# Patient Record
Sex: MALE | Race: WHITE | NOT HISPANIC OR LATINO | Employment: FULL TIME | URBAN - METROPOLITAN AREA
[De-identification: names, ages, dates, MRNs, and addresses within clinical notes are randomized per-mention and may not be internally consistent; named-entity substitution may affect disease eponyms.]

---

## 2017-08-23 ENCOUNTER — TELEPHONE (OUTPATIENT)
Dept: FAMILY MEDICINE | Facility: CLINIC | Age: 52
End: 2017-08-23

## 2017-08-23 ENCOUNTER — DOCUMENTATION ONLY (OUTPATIENT)
Dept: FAMILY MEDICINE | Facility: CLINIC | Age: 52
End: 2017-08-23

## 2017-08-23 DIAGNOSIS — F41.9 ANXIETY: ICD-10-CM

## 2017-08-23 PROBLEM — J30.2 SEASONAL ALLERGIES: Status: ACTIVE | Noted: 2017-08-23

## 2017-08-23 PROBLEM — E78.5 HYPERLIPIDEMIA: Status: ACTIVE | Noted: 2017-08-23

## 2017-08-23 PROBLEM — E11.9 DM (DIABETES MELLITUS): Status: ACTIVE | Noted: 2017-08-23

## 2017-08-23 PROBLEM — I10 HTN (HYPERTENSION): Status: ACTIVE | Noted: 2017-08-23

## 2017-08-23 RX ORDER — LISINOPRIL AND HYDROCHLOROTHIAZIDE 12.5; 2 MG/1; MG/1
1 TABLET ORAL DAILY
Refills: 1 | COMMUNITY
Start: 2017-07-02 | End: 2017-09-30 | Stop reason: SDUPTHER

## 2017-08-23 RX ORDER — ATORVASTATIN CALCIUM 20 MG/1
TABLET, FILM COATED ORAL
Refills: 1 | COMMUNITY
Start: 2017-07-16 | End: 2017-10-17 | Stop reason: SDUPTHER

## 2017-08-23 RX ORDER — INSULIN GLARGINE 100 [IU]/ML
INJECTION, SOLUTION SUBCUTANEOUS
Refills: 5 | COMMUNITY
Start: 2017-07-11 | End: 2018-04-03 | Stop reason: SDUPTHER

## 2017-08-23 RX ORDER — ASPIRIN 81 MG/1
81 TABLET ORAL DAILY
COMMUNITY

## 2017-09-02 RX ORDER — GLYBURIDE 5 MG/1
TABLET ORAL
Qty: 360 TABLET | Refills: 1 | Status: SHIPPED | OUTPATIENT
Start: 2017-09-02 | End: 2018-04-03 | Stop reason: SDUPTHER

## 2017-09-19 ENCOUNTER — OFFICE VISIT (OUTPATIENT)
Dept: PRIMARY CARE CLINIC | Facility: CLINIC | Age: 52
End: 2017-09-19
Payer: COMMERCIAL

## 2017-09-19 ENCOUNTER — TELEPHONE (OUTPATIENT)
Dept: PRIMARY CARE CLINIC | Facility: CLINIC | Age: 52
End: 2017-09-19

## 2017-09-19 VITALS
SYSTOLIC BLOOD PRESSURE: 140 MMHG | BODY MASS INDEX: 35.61 KG/M2 | RESPIRATION RATE: 18 BRPM | WEIGHT: 254.38 LBS | TEMPERATURE: 98 F | HEIGHT: 71 IN | DIASTOLIC BLOOD PRESSURE: 70 MMHG

## 2017-09-19 DIAGNOSIS — E11.9 TYPE 2 DIABETES MELLITUS WITHOUT COMPLICATION, UNSPECIFIED LONG TERM INSULIN USE STATUS: ICD-10-CM

## 2017-09-19 DIAGNOSIS — M70.21 OLECRANON BURSITIS OF RIGHT ELBOW: Primary | ICD-10-CM

## 2017-09-19 PROCEDURE — 99213 OFFICE O/P EST LOW 20 MIN: CPT | Mod: 24,S$GLB,, | Performed by: NURSE PRACTITIONER

## 2017-09-19 PROCEDURE — 3008F BODY MASS INDEX DOCD: CPT | Mod: S$GLB,,, | Performed by: NURSE PRACTITIONER

## 2017-09-19 PROCEDURE — 20605 DRAIN/INJ JOINT/BURSA W/O US: CPT | Mod: RT,S$GLB,, | Performed by: FAMILY MEDICINE

## 2017-09-19 PROCEDURE — 4010F ACE/ARB THERAPY RXD/TAKEN: CPT | Mod: S$GLB,,, | Performed by: NURSE PRACTITIONER

## 2017-09-19 RX ORDER — NAPROXEN 500 MG/1
500 TABLET ORAL 2 TIMES DAILY
Qty: 30 TABLET | Refills: 2 | Status: SHIPPED | OUTPATIENT
Start: 2017-09-19 | End: 2018-03-28

## 2017-09-19 RX ORDER — DAPAGLIFLOZIN 10 MG/1
1 TABLET, FILM COATED ORAL DAILY
COMMUNITY
Start: 2017-07-05 | End: 2017-12-14 | Stop reason: SDUPTHER

## 2017-09-19 NOTE — TELEPHONE ENCOUNTER
Spoke to patient.  He states he has an appt for his regular follow up on 9/25/17, but woke up this morning with his right elbow red, swollen, hot and very painful.  Forearm is swollen as well.  Please advise

## 2017-09-19 NOTE — TELEPHONE ENCOUNTER
----- Message from Marlena Lewis sent at 9/19/2017  9:28 AM CDT -----  Contact: shila   Thinks bursa ruptured, swollen painful   Call back

## 2017-09-19 NOTE — PROCEDURES
Intermediate Joint Aspiration/Injection  Date/Time: 9/19/2017 5:34 PM  Performed by: ALBERTO ERVIN  Authorized by: ALBERTO ERVIN     Consent Done?:  Yes (Verbal)  Indications:  Joint swelling, pain and diagnostic evaluation  Site marked: The procedure site was marked    Timeout: Prior to procedure the correct patient, procedure, and site was verified      Location:  Elbow  Site:  R olecranon bursa  Prep: Patient was prepped and draped in usual sterile fashion    Ultrasonic Guidance for needle placement: No  Needle size:  18 G  Approach:  Posterior  Aspirate amount (ml):  30  Aspirate:  Serous and cloudy  Lab: Fluid sent for laboratory analysis    Patient tolerance:  Patient tolerated the procedure well with no immediate complications

## 2017-09-19 NOTE — PROGRESS NOTES
"Chief Complaint  Chief Complaint   Patient presents with    Joint Swelling     right elbow       HPI  Cedrick Saunders is a 52 y.o. male with multiple medical diagnoses as listed in the medical history and problem list that presents for right elbow swelling and pain.   Patient is new to me but known to the clinic per his last visit 5-. Presents with complaints of elbow swelling "on and off for the past few months". Fluid usually drains spontaneously with clear/yellow fluid. Following spontaneous drainage about 3 days ago, the elbow became red and warm. It has progressively worsened , increased in swelling and tenderness over the past few days. This is the first time the elbow has become red and warm to touch. Reports taking Ibuprofen with no relief. History of various right elbow surgeries including bone spur removal several years ago. Denies fever. Has a history of recurrent staph infections and abscesses in the past. Denies SOB, chest pain, palpitations.        PAST MEDICAL HISTORY:  Past Medical History:   Diagnosis Date    Anxiety     Diabetes mellitus        PAST SURGICAL HISTORY:  Past Surgical History:   Procedure Laterality Date    bilateral knee replacement         SOCIAL HISTORY:  Social History     Social History    Marital status:      Spouse name: N/A    Number of children: N/A    Years of education: N/A     Occupational History    Not on file.     Social History Main Topics    Smoking status: Never Smoker    Smokeless tobacco: Never Used    Alcohol use Yes      Comment: social    Drug use: No    Sexual activity: Not on file     Other Topics Concern    Not on file     Social History Narrative    No narrative on file       FAMILY HISTORY:  Family History   Problem Relation Age of Onset    Heart disease Father     Diabetes Father        ALLERGIES AND MEDICATIONS: updated and reviewed.  Review of patient's allergies indicates:  No Known Allergies  Current Outpatient " Prescriptions   Medication Sig Dispense Refill    FARXIGA 10 mg Tab       aspirin (ECOTRIN) 81 MG EC tablet Take 81 mg by mouth once daily.      atorvastatin (LIPITOR) 20 MG tablet TAKE 1 TABLET ONCE A DAY ORALLY FOR 90 DAYS  1    BASAGLAR KWIKPEN 100 unit/mL (3 mL) InPn pen 40 UNITS AT BEDTIME ONCE A DAY SUBCUTANEOUS  5    escitalopram (LEXAPRO) 10 MG tablet Take 10 mg by mouth once daily.        glyBURIDE (DIABETA) 5 MG tablet TAKE 2 TABLETS TWICE A DAY ORALLY FOR 90 DAYS SUPPLY 360 tablet 1    lisinopril-hydrochlorothiazide (PRINZIDE,ZESTORETIC) 20-12.5 mg per tablet Take 1 tablet by mouth once daily.  1    naproxen (NAPROSYN) 500 MG tablet Take 1 tablet (500 mg total) by mouth 2 (two) times daily. 30 tablet 2    pioglitazone (ACTOS) 30 MG tablet Take 30 mg by mouth once daily.        sitagliptan-metformin (JANUMET) 50-1,000 mg per tablet Take 1 tablet by mouth 2 (two) times daily with meals.         Current Facility-Administered Medications   Medication Dose Route Frequency Provider Last Rate Last Dose    betamethasone acetate-betamethasone sodium phosphate injection 6 mg  6 mg Intramuscular 1 time in Clinic/HOD Love Mabry NP             ROS  Review of Systems   Constitutional: Negative for chills, fatigue and fever.   HENT: Negative for congestion, rhinorrhea, sinus pressure and sore throat.    Respiratory: Negative for cough, chest tightness and shortness of breath.    Cardiovascular: Negative for chest pain and palpitations.   Gastrointestinal: Negative for abdominal pain, blood in stool, diarrhea, nausea and vomiting.   Genitourinary: Negative for dysuria, frequency, hematuria and urgency.   Musculoskeletal: Positive for arthralgias and joint swelling.   Skin: Negative for rash and wound.   Neurological: Negative for dizziness and headaches.   Psychiatric/Behavioral: Negative for dysphoric mood and sleep disturbance. The patient is not nervous/anxious.            PHYSICAL EXAM  Vitals:     "09/19/17 1140   BP: (!) 140/70   BP Location: Right arm   Patient Position: Sitting   BP Method: Medium (Manual)   Resp: 18   Temp: 98.2 °F (36.8 °C)   TempSrc: Oral   Weight: 115.4 kg (254 lb 6.4 oz)   Height: 5' 11" (1.803 m)    Body mass index is 35.48 kg/m².  Weight: 115.4 kg (254 lb 6.4 oz)   Height: 5' 11" (180.3 cm)     Physical Exam   Constitutional: He is oriented to person, place, and time. He appears well-developed and well-nourished.   HENT:   Head: Normocephalic.   Mouth/Throat: Uvula is midline, oropharynx is clear and moist and mucous membranes are normal.   Eyes: Conjunctivae are normal.   Cardiovascular: Normal rate, regular rhythm, normal heart sounds and normal pulses.    No murmur heard.  Pulses:       Radial pulses are 2+ on the right side, and 2+ on the left side.   Pulmonary/Chest: Effort normal and breath sounds normal. He has no wheezes.   Abdominal: Soft. Bowel sounds are normal. There is no tenderness.   Musculoskeletal: He exhibits no edema.        Right elbow: He exhibits decreased range of motion, swelling and effusion. Tenderness (right elbow with swelling, warmth, TTP) found. Olecranon process tenderness noted.   Neurological: He is alert and oriented to person, place, and time.   Skin: Skin is warm and dry. No rash noted.   Psychiatric: He has a normal mood and affect.         Health Maintenance       Date Due Completion Date    Hepatitis C Screening 1965 ---    Lipid Panel 1965 ---    Foot Exam 08/26/1973 ---    Eye Exam 08/26/1973 ---    TETANUS VACCINE 08/26/1981 ---    Pneumococcal PPSV23 (Medium Risk) (1) 08/26/1981 ---    Hemoglobin A1c 08/01/2007 2/1/2007    Colonoscopy 08/26/2013 ---    Influenza Vaccine 08/01/2017 ---            Assessment & Plan    Cedrick was seen today for joint swelling.    Diagnoses and all orders for this visit:    Olecranon bursitis of right elbow  -     CULTURE, AEROBIC  (SPECIFY SOURCE)  -     Culture, Anaerobic  -     naproxen " (NAPROSYN) 500 MG tablet; Take 1 tablet (500 mg total) by mouth 2 (two) times daily.  - Ice and Compression for the next several days  - Call clinic if any temperatures develop or new onset s/s of infection - will consider oral ABX at that time.       Type 2 diabetes mellitus without complication, unspecified long term insulin use status  -     Comprehensive metabolic panel; Future  -     Hemoglobin A1c; Future  -  Follow-up labwork next week with PCP          Health Maintenance reviewed.    Follow-up: No Follow-up on file.

## 2017-09-25 ENCOUNTER — OFFICE VISIT (OUTPATIENT)
Dept: PRIMARY CARE CLINIC | Facility: CLINIC | Age: 52
End: 2017-09-25
Payer: COMMERCIAL

## 2017-09-25 VITALS
HEART RATE: 66 BPM | HEIGHT: 71 IN | WEIGHT: 251.31 LBS | SYSTOLIC BLOOD PRESSURE: 106 MMHG | TEMPERATURE: 98 F | RESPIRATION RATE: 18 BRPM | DIASTOLIC BLOOD PRESSURE: 65 MMHG | OXYGEN SATURATION: 96 % | BODY MASS INDEX: 35.18 KG/M2

## 2017-09-25 DIAGNOSIS — M70.21 OLECRANON BURSITIS OF RIGHT ELBOW: ICD-10-CM

## 2017-09-25 DIAGNOSIS — M71.121 SEPTIC OLECRANON BURSITIS, RIGHT: Primary | ICD-10-CM

## 2017-09-25 PROBLEM — E78.2 MIXED HYPERLIPIDEMIA: Status: ACTIVE | Noted: 2017-08-23

## 2017-09-25 PROBLEM — E78.2 DM TYPE 2 WITH DIABETIC MIXED HYPERLIPIDEMIA: Status: ACTIVE | Noted: 2017-08-23

## 2017-09-25 PROBLEM — E11.69 DM TYPE 2 WITH DIABETIC MIXED HYPERLIPIDEMIA: Status: ACTIVE | Noted: 2017-08-23

## 2017-09-25 PROCEDURE — 99213 OFFICE O/P EST LOW 20 MIN: CPT | Mod: S$GLB,,, | Performed by: FAMILY MEDICINE

## 2017-09-25 PROCEDURE — 3008F BODY MASS INDEX DOCD: CPT | Mod: S$GLB,,, | Performed by: FAMILY MEDICINE

## 2017-09-25 PROCEDURE — 99999 PR PBB SHADOW E&M-EST. PATIENT-LVL III: CPT | Mod: PBBFAC,,, | Performed by: FAMILY MEDICINE

## 2017-09-25 RX ORDER — TRAMADOL HYDROCHLORIDE 50 MG/1
50 TABLET ORAL EVERY 6 HOURS PRN
Qty: 30 TABLET | Refills: 1 | Status: SHIPPED | OUTPATIENT
Start: 2017-09-25 | End: 2018-03-28

## 2017-09-25 RX ORDER — MINOCYCLINE HYDROCHLORIDE 100 MG/1
100 CAPSULE ORAL 2 TIMES DAILY
Qty: 28 CAPSULE | Refills: 0 | Status: SHIPPED | OUTPATIENT
Start: 2017-09-25 | End: 2017-10-09

## 2017-09-25 NOTE — PROGRESS NOTES
"Subjective:       Patient ID: Cedrick Saunders is a 52 y.o. male.    Chief Complaint: Follow-up (right elbow)    Continues to have right elbow pain from olecranon bursitis. I aspirated 30cc of serous fluid from bursa last week. Swelling a little better, but still in pain. Ran fever for a few days late last week, subsequently resolved. Taking naproxen with minimal relief. Has appt scheduled with neurologist on 10/10.      Review of Systems   Constitutional: Positive for fever.   Respiratory: Negative for shortness of breath.    Cardiovascular: Negative for chest pain.   Musculoskeletal: Positive for joint swelling.       Objective:      Vitals:    09/25/17 0828   BP: 106/65   BP Location: Left arm   Patient Position: Sitting   BP Method: Large (Automatic)   Pulse: 66   Resp: 18   Temp: 98.3 °F (36.8 °C)   TempSrc: Oral   SpO2: 96%   Weight: 114 kg (251 lb 4.8 oz)   Height: 5' 11" (1.803 m)     Physical Exam   Constitutional: He is oriented to person, place, and time. He appears well-developed and well-nourished.   HENT:   Head: Normocephalic and atraumatic.   Cardiovascular: Normal rate, regular rhythm and normal heart sounds.    Pulmonary/Chest: Effort normal and breath sounds normal.   Musculoskeletal: He exhibits no edema.   Right olecranon bursa swollen and tender, no erythema or warmth   Neurological: He is alert and oriented to person, place, and time.   Skin: Skin is warm and dry.   Vitals reviewed.      Assessment:       1. Septic olecranon bursitis, right    2. Olecranon bursitis of right elbow        Plan:       Septic olecranon bursitis, right  Comments:  Fluid culture from olecranon bursa aspirate grew small amount of MRSA. Will start on 2-week course of abx. Pt instructed to f/u ortho as scheduled, ER prn  Orders:  -     minocycline (MINOCIN,DYNACIN) 100 MG capsule; Take 1 capsule (100 mg total) by mouth 2 (two) times daily.  Dispense: 28 capsule; Refill: 0    Olecranon bursitis of right elbow  -   "   tramadol (ULTRAM) 50 mg tablet; Take 1 tablet (50 mg total) by mouth every 6 (six) hours as needed for Pain.  Dispense: 30 tablet; Refill: 1      Medication List with Changes/Refills   New Medications    MINOCYCLINE (MINOCIN,DYNACIN) 100 MG CAPSULE    Take 1 capsule (100 mg total) by mouth 2 (two) times daily.    TRAMADOL (ULTRAM) 50 MG TABLET    Take 1 tablet (50 mg total) by mouth every 6 (six) hours as needed for Pain.   Current Medications    ASPIRIN (ECOTRIN) 81 MG EC TABLET    Take 81 mg by mouth once daily.    ATORVASTATIN (LIPITOR) 20 MG TABLET    TAKE 1 TABLET ONCE A DAY ORALLY FOR 90 DAYS    BASAGLAR KWIKPEN 100 UNIT/ML (3 ML) INPN PEN    40 UNITS AT BEDTIME ONCE A DAY SUBCUTANEOUS    ESCITALOPRAM (LEXAPRO) 10 MG TABLET    Take 10 mg by mouth once daily.      FARXIGA 10 MG TAB    Take 1 tablet by mouth once daily.     GLYBURIDE (DIABETA) 5 MG TABLET    TAKE 2 TABLETS TWICE A DAY ORALLY FOR 90 DAYS SUPPLY    LISINOPRIL-HYDROCHLOROTHIAZIDE (PRINZIDE,ZESTORETIC) 20-12.5 MG PER TABLET    Take 1 tablet by mouth once daily.    NAPROXEN (NAPROSYN) 500 MG TABLET    Take 1 tablet (500 mg total) by mouth 2 (two) times daily.    SITAGLIPTAN-METFORMIN (JANUMET) 50-1,000 MG PER TABLET    Take 1 tablet by mouth 2 (two) times daily with meals.     Discontinued Medications    PIOGLITAZONE (ACTOS) 30 MG TABLET    Take 30 mg by mouth once daily.

## 2017-10-02 RX ORDER — LISINOPRIL AND HYDROCHLOROTHIAZIDE 12.5; 2 MG/1; MG/1
TABLET ORAL
Qty: 90 TABLET | Refills: 1 | Status: SHIPPED | OUTPATIENT
Start: 2017-10-02 | End: 2018-04-03 | Stop reason: SDUPTHER

## 2017-10-13 PROBLEM — M70.21 OLECRANON BURSITIS OF RIGHT ELBOW: Status: ACTIVE | Noted: 2017-10-13

## 2017-10-18 RX ORDER — ATORVASTATIN CALCIUM 20 MG/1
TABLET, FILM COATED ORAL
Qty: 90 TABLET | Refills: 1 | Status: SHIPPED | OUTPATIENT
Start: 2017-10-18 | End: 2018-04-17 | Stop reason: SDUPTHER

## 2017-12-14 RX ORDER — DAPAGLIFLOZIN 10 MG/1
TABLET, FILM COATED ORAL
Qty: 90 TABLET | Refills: 1 | Status: SHIPPED | OUTPATIENT
Start: 2017-12-14 | End: 2018-06-17 | Stop reason: SDUPTHER

## 2018-01-12 RX ORDER — SITAGLIPTIN AND METFORMIN HYDROCHLORIDE 1000; 50 MG/1; MG/1
TABLET, FILM COATED ORAL
Qty: 180 TABLET | Refills: 1 | Status: SHIPPED | OUTPATIENT
Start: 2018-01-12 | End: 2018-07-12 | Stop reason: SDUPTHER

## 2018-02-07 RX ORDER — ESCITALOPRAM OXALATE 10 MG/1
TABLET ORAL
Qty: 90 TABLET | Refills: 1 | Status: SHIPPED | OUTPATIENT
Start: 2018-02-07 | End: 2018-08-09 | Stop reason: SDUPTHER

## 2018-03-28 ENCOUNTER — TELEPHONE (OUTPATIENT)
Dept: PRIMARY CARE CLINIC | Facility: CLINIC | Age: 53
End: 2018-03-28

## 2018-03-28 ENCOUNTER — OFFICE VISIT (OUTPATIENT)
Dept: PRIMARY CARE CLINIC | Facility: CLINIC | Age: 53
End: 2018-03-28
Payer: COMMERCIAL

## 2018-03-28 ENCOUNTER — CLINICAL SUPPORT (OUTPATIENT)
Dept: PRIMARY CARE CLINIC | Facility: CLINIC | Age: 53
End: 2018-03-28
Payer: COMMERCIAL

## 2018-03-28 VITALS
HEART RATE: 68 BPM | HEIGHT: 71 IN | TEMPERATURE: 97 F | BODY MASS INDEX: 34.86 KG/M2 | SYSTOLIC BLOOD PRESSURE: 112 MMHG | OXYGEN SATURATION: 98 % | RESPIRATION RATE: 18 BRPM | WEIGHT: 249 LBS | DIASTOLIC BLOOD PRESSURE: 73 MMHG

## 2018-03-28 DIAGNOSIS — Z12.5 PROSTATE CANCER SCREENING: ICD-10-CM

## 2018-03-28 DIAGNOSIS — I10 HYPERTENSION, UNSPECIFIED TYPE: ICD-10-CM

## 2018-03-28 DIAGNOSIS — E78.2 DM TYPE 2 WITH DIABETIC MIXED HYPERLIPIDEMIA: Primary | ICD-10-CM

## 2018-03-28 DIAGNOSIS — Z12.11 COLON CANCER SCREENING: ICD-10-CM

## 2018-03-28 DIAGNOSIS — E78.2 MIXED HYPERLIPIDEMIA: ICD-10-CM

## 2018-03-28 DIAGNOSIS — E78.2 DM TYPE 2 WITH DIABETIC MIXED HYPERLIPIDEMIA: ICD-10-CM

## 2018-03-28 DIAGNOSIS — E11.69 DM TYPE 2 WITH DIABETIC MIXED HYPERLIPIDEMIA: ICD-10-CM

## 2018-03-28 DIAGNOSIS — Z11.59 NEED FOR HEPATITIS C SCREENING TEST: ICD-10-CM

## 2018-03-28 DIAGNOSIS — K63.5 POLYP OF COLON, UNSPECIFIED PART OF COLON, UNSPECIFIED TYPE: ICD-10-CM

## 2018-03-28 DIAGNOSIS — R10.9 RIGHT FLANK PAIN: ICD-10-CM

## 2018-03-28 DIAGNOSIS — E11.69 DM TYPE 2 WITH DIABETIC MIXED HYPERLIPIDEMIA: Primary | ICD-10-CM

## 2018-03-28 LAB
ALBUMIN SERPL BCP-MCNC: 4.1 G/DL
ALP SERPL-CCNC: 67 U/L
ALT SERPL W/O P-5'-P-CCNC: 23 U/L
ANION GAP SERPL CALC-SCNC: 7 MMOL/L
AST SERPL-CCNC: 14 U/L
BASOPHILS # BLD AUTO: 0.02 K/UL
BASOPHILS NFR BLD: 0.4 %
BILIRUB SERPL-MCNC: 0.4 MG/DL
BILIRUB SERPL-MCNC: ABNORMAL MG/DL
BLOOD URINE, POC: ABNORMAL
BUN SERPL-MCNC: 19 MG/DL
CALCIUM SERPL-MCNC: 9.5 MG/DL
CHLORIDE SERPL-SCNC: 104 MMOL/L
CHOLEST SERPL-MCNC: 122 MG/DL
CHOLEST/HDLC SERPL: 3.7 {RATIO}
CO2 SERPL-SCNC: 24 MMOL/L
COLOR, POC UA: YELLOW
COMPLEXED PSA SERPL-MCNC: 1.8 NG/ML
CREAT SERPL-MCNC: 0.8 MG/DL
CREAT UR-MCNC: 74 MG/DL
DIFFERENTIAL METHOD: ABNORMAL
EOSINOPHIL # BLD AUTO: 0.2 K/UL
EOSINOPHIL NFR BLD: 3.5 %
ERYTHROCYTE [DISTWIDTH] IN BLOOD BY AUTOMATED COUNT: 12.8 %
EST. GFR  (AFRICAN AMERICAN): >60 ML/MIN/1.73 M^2
EST. GFR  (NON AFRICAN AMERICAN): >60 ML/MIN/1.73 M^2
ESTIMATED AVG GLUCOSE: 206 MG/DL
GLUCOSE SERPL-MCNC: 150 MG/DL
GLUCOSE UR QL STRIP: 1000
HBA1C MFR BLD HPLC: 8.8 %
HCT VFR BLD AUTO: 41.7 %
HDLC SERPL-MCNC: 33 MG/DL
HDLC SERPL: 27 %
HGB BLD-MCNC: 13.4 G/DL
IMM GRANULOCYTES # BLD AUTO: 0.03 K/UL
IMM GRANULOCYTES NFR BLD AUTO: 0.7 %
KETONES UR QL STRIP: ABNORMAL
LDLC SERPL CALC-MCNC: 60.8 MG/DL
LEUKOCYTE ESTERASE URINE, POC: ABNORMAL
LYMPHOCYTES # BLD AUTO: 1.1 K/UL
LYMPHOCYTES NFR BLD: 24.2 %
MCH RBC QN AUTO: 29.5 PG
MCHC RBC AUTO-ENTMCNC: 32.1 G/DL
MCV RBC AUTO: 92 FL
MICROALBUMIN UR DL<=1MG/L-MCNC: <2.5 UG/ML
MICROALBUMIN/CREATININE RATIO: NORMAL UG/MG
MONOCYTES # BLD AUTO: 0.4 K/UL
MONOCYTES NFR BLD: 9 %
NEUTROPHILS # BLD AUTO: 2.9 K/UL
NEUTROPHILS NFR BLD: 62.2 %
NITRITE, POC UA: ABNORMAL
NONHDLC SERPL-MCNC: 89 MG/DL
NRBC BLD-RTO: 0 /100 WBC
PH, POC UA: 6
PLATELET # BLD AUTO: 211 K/UL
PMV BLD AUTO: 11.3 FL
POTASSIUM SERPL-SCNC: 4.4 MMOL/L
PROT SERPL-MCNC: 7.4 G/DL
PROTEIN, POC: ABNORMAL
RBC # BLD AUTO: 4.54 M/UL
SODIUM SERPL-SCNC: 135 MMOL/L
SPECIFIC GRAVITY, POC UA: 1
TRIGL SERPL-MCNC: 141 MG/DL
UROBILINOGEN, POC UA: ABNORMAL
WBC # BLD AUTO: 4.58 K/UL

## 2018-03-28 PROCEDURE — 3045F PR MOST RECENT HEMOGLOBIN A1C LEVEL 7.0-9.0%: CPT | Mod: CPTII,S$GLB,, | Performed by: FAMILY MEDICINE

## 2018-03-28 PROCEDURE — 80053 COMPREHEN METABOLIC PANEL: CPT

## 2018-03-28 PROCEDURE — 85025 COMPLETE CBC W/AUTO DIFF WBC: CPT

## 2018-03-28 PROCEDURE — 81001 URINALYSIS AUTO W/SCOPE: CPT | Mod: S$GLB,,, | Performed by: FAMILY MEDICINE

## 2018-03-28 PROCEDURE — 83036 HEMOGLOBIN GLYCOSYLATED A1C: CPT

## 2018-03-28 PROCEDURE — 3078F DIAST BP <80 MM HG: CPT | Mod: CPTII,S$GLB,, | Performed by: FAMILY MEDICINE

## 2018-03-28 PROCEDURE — 99999 PR PBB SHADOW E&M-EST. PATIENT-LVL II: CPT | Mod: PBBFAC,,,

## 2018-03-28 PROCEDURE — 84153 ASSAY OF PSA TOTAL: CPT

## 2018-03-28 PROCEDURE — 3074F SYST BP LT 130 MM HG: CPT | Mod: CPTII,S$GLB,, | Performed by: FAMILY MEDICINE

## 2018-03-28 PROCEDURE — 99999 PR PBB SHADOW E&M-EST. PATIENT-LVL III: CPT | Mod: PBBFAC,,, | Performed by: FAMILY MEDICINE

## 2018-03-28 PROCEDURE — 99214 OFFICE O/P EST MOD 30 MIN: CPT | Mod: 25,S$GLB,, | Performed by: FAMILY MEDICINE

## 2018-03-28 PROCEDURE — 86803 HEPATITIS C AB TEST: CPT

## 2018-03-28 PROCEDURE — 82043 UR ALBUMIN QUANTITATIVE: CPT

## 2018-03-28 PROCEDURE — 80061 LIPID PANEL: CPT

## 2018-03-28 NOTE — PROGRESS NOTES
"Subjective:       Patient ID: Cedrick Saunders is a 52 y.o. male.    Chief Complaint: Diabetes (Patient says he is here to get his A1c done)    DM - fasting CBG's running 125-135, has been generally eating pretty well. Recently started back exercising (cycling)  HTN - BP well controlled, no SE from meds  HLD - compliant with meds, most recent labs look good  Has been having persistent, mild nagging RLQ discomfort. More aware of it when he is up walking, but present all the time, does not limit his activity. No identified triggers  Hx of colon polyps, due for colonoscopy, last done just over 5 years ago      Review of Systems   Constitutional: Negative for chills, diaphoresis and fever.   HENT: Negative for trouble swallowing.    Eyes: Negative for visual disturbance.   Respiratory: Negative for shortness of breath.    Cardiovascular: Negative for chest pain.   Gastrointestinal: Positive for abdominal pain. Negative for blood in stool, diarrhea, nausea and vomiting.   Endocrine: Negative for polydipsia and polyuria.   Genitourinary: Negative for difficulty urinating and dysuria.   Musculoskeletal: Positive for arthralgias (improving).   Skin: Negative for rash.   Hematological: Does not bruise/bleed easily.       Objective:      Vitals:    03/28/18 0755   BP: 112/73   BP Location: Left arm   Patient Position: Sitting   BP Method: Large (Automatic)   Pulse: 68   Resp: 18   Temp: 97.3 °F (36.3 °C)   TempSrc: Oral   SpO2: 98%   Weight: 112.9 kg (249 lb)   Height: 5' 11" (1.803 m)     Physical Exam   Constitutional: He is oriented to person, place, and time. He appears well-developed and well-nourished.   HENT:   Head: Normocephalic and atraumatic.   Neck: Neck supple. No JVD present.   Cardiovascular: Normal rate, regular rhythm and normal heart sounds.    Pulmonary/Chest: Effort normal and breath sounds normal.   Abdominal: Soft. Bowel sounds are normal. He exhibits no mass. There is no tenderness. There is no " rebound, no guarding and no CVA tenderness.   Musculoskeletal: He exhibits no edema.   Neurological: He is alert and oriented to person, place, and time.   Skin: Skin is warm and dry.   Psychiatric: He has a normal mood and affect. His behavior is normal.   Nursing note and vitals reviewed.      Assessment:       1. DM type 2 with diabetic mixed hyperlipidemia    2. Mixed hyperlipidemia    3. Hypertension, unspecified type    4. Right flank pain    5. Polyp of colon, unspecified part of colon, unspecified type    6. Colon cancer screening    7. Prostate cancer screening    8. Need for hepatitis C screening test        Plan:       DM type 2 with diabetic mixed hyperlipidemia  -     Hemoglobin A1c; Future; Expected date: 03/28/2018  -     Microalbumin/creatinine urine ratio; Future; Expected date: 03/28/2018    Mixed hyperlipidemia  -     Comprehensive metabolic panel; Future; Expected date: 03/28/2018  -     Lipid panel; Future; Expected date: 03/28/2018    Hypertension, unspecified type  -     CBC auto differential; Future; Expected date: 03/28/2018    Right flank pain  Comments:  Urinalysis unremarkable, unclear etiology.  Needs labs and updated colonoscopy  Orders:  -     POCT urinalysis, dipstick or tablet reag    Polyp of colon, unspecified part of colon, unspecified type  -     Ambulatory referral to Colorectal Surgery    Colon cancer screening  -     Ambulatory referral to Colorectal Surgery    Prostate cancer screening  -     PSA, Screening; Future; Expected date: 03/28/2018    Need for hepatitis C screening test  -     Hepatitis C antibody; Future; Expected date: 03/28/2018      Medication List with Changes/Refills   Current Medications    ASPIRIN (ECOTRIN) 81 MG EC TABLET    Take 81 mg by mouth once daily.    ATORVASTATIN (LIPITOR) 20 MG TABLET    TAKE 1 TABLET ONCE A DAY ORALLY FOR 90 DAYS    LAUREN LOPEZPEN 100 UNIT/ML (3 ML) INPN PEN    40 UNITS AT BEDTIME ONCE A DAY SUBCUTANEOUS    ESCITALOPRAM OXALATE  (LEXAPRO) 10 MG TABLET    TAKE 1 TABLET ONCE A DAY ORALLY 90 DAY(S)    FARXIGA 10 MG TAB    TAKE 1 TABLET ONCE A DAY ORALLY    GLYBURIDE (DIABETA) 5 MG TABLET    TAKE 2 TABLETS TWICE A DAY ORALLY FOR 90 DAYS SUPPLY    JANUMET 50-1,000 MG PER TABLET    TAKE 1 TABLET WITH MEALS TWICE A DAY ORALLY 90 DAYS    LISINOPRIL-HYDROCHLOROTHIAZIDE (PRINZIDE,ZESTORETIC) 20-12.5 MG PER TABLET    TAKE 1 TABLET BY MOUTH EVERY DAY   Discontinued Medications    CLINDAMYCIN (CLEOCIN) 300 MG CAPSULE    Take 1 capsule (300 mg total) by mouth every 6 (six) hours.    CLINDAMYCIN (CLEOCIN) 300 MG CAPSULE    Take 1 capsule (300 mg total) by mouth every 6 (six) hours.    NAPROXEN (NAPROSYN) 500 MG TABLET    Take 1 tablet (500 mg total) by mouth 2 (two) times daily.    OXYCODONE-ACETAMINOPHEN (PERCOCET)  MG PER TABLET    Take 1 tablet by mouth every 4 to 6 hours as needed for Pain.    SULFAMETHOXAZOLE-TRIMETHOPRIM 800-160MG (BACTRIM DS) 800-160 MG TAB    Take 1 tablet by mouth 2 (two) times daily.    TRAMADOL (ULTRAM) 50 MG TABLET    Take 1 tablet (50 mg total) by mouth every 6 (six) hours as needed for Pain.

## 2018-03-29 LAB — HCV AB SERPL QL IA: NEGATIVE

## 2018-04-02 ENCOUNTER — TELEPHONE (OUTPATIENT)
Dept: PRIMARY CARE CLINIC | Facility: CLINIC | Age: 53
End: 2018-04-02

## 2018-04-02 DIAGNOSIS — E11.69 DM TYPE 2 WITH DIABETIC MIXED HYPERLIPIDEMIA: Primary | ICD-10-CM

## 2018-04-02 DIAGNOSIS — E78.2 DM TYPE 2 WITH DIABETIC MIXED HYPERLIPIDEMIA: Primary | ICD-10-CM

## 2018-04-02 NOTE — TELEPHONE ENCOUNTER
----- Message from Christi Topete sent at 4/2/2018 11:12 AM CDT -----  Contact: Patient  Memo, patient [phone], Missed your call, please call him back. Thanks.

## 2018-04-02 NOTE — TELEPHONE ENCOUNTER
----- Message from Christi Topete sent at 4/2/2018 12:36 PM CDT -----  Contact: Patient  Memo, patient [phone], Missed your again, please call him back. Thanks.

## 2018-04-03 RX ORDER — GLYBURIDE 5 MG/1
TABLET ORAL
Qty: 360 TABLET | Refills: 1 | Status: SHIPPED | OUTPATIENT
Start: 2018-04-03 | End: 2018-09-24 | Stop reason: SDUPTHER

## 2018-04-03 RX ORDER — LISINOPRIL AND HYDROCHLOROTHIAZIDE 12.5; 2 MG/1; MG/1
TABLET ORAL
Qty: 90 TABLET | Refills: 1 | Status: SHIPPED | OUTPATIENT
Start: 2018-04-03 | End: 2018-09-24 | Stop reason: SDUPTHER

## 2018-04-03 RX ORDER — INSULIN GLARGINE 100 [IU]/ML
INJECTION, SOLUTION SUBCUTANEOUS
Qty: 30 SYRINGE | Refills: 3 | Status: SHIPPED | OUTPATIENT
Start: 2018-04-03 | End: 2018-09-30 | Stop reason: SDUPTHER

## 2018-04-09 ENCOUNTER — TELEPHONE (OUTPATIENT)
Dept: SURGERY | Facility: CLINIC | Age: 53
End: 2018-04-09

## 2018-04-09 DIAGNOSIS — Z12.11 SCREEN FOR COLON CANCER: Primary | ICD-10-CM

## 2018-04-09 RX ORDER — SODIUM CHLORIDE, SODIUM LACTATE, POTASSIUM CHLORIDE, CALCIUM CHLORIDE 600; 310; 30; 20 MG/100ML; MG/100ML; MG/100ML; MG/100ML
INJECTION, SOLUTION INTRAVENOUS CONTINUOUS
Status: CANCELLED | OUTPATIENT
Start: 2018-04-09

## 2018-04-09 NOTE — TELEPHONE ENCOUNTER
----- Message from Daniel Rodrigues LPN sent at 4/3/2018  1:06 PM CDT -----  LVM  ----- Message -----  From: Ramon Steen MA  Sent: 3/28/2018   9:20 AM  To: Daniel Rodrigues LPN    Please contact patient to schedule a colonoscopy     Thank you

## 2018-04-09 NOTE — TELEPHONE ENCOUNTER
Colonoscopy is scheduled for 5/17/18 arrival time per the preop nurse.  Preop will call from [phone]   Rosie Garcia after midnight  Someone to drive you home    PLEASE NOTE THAT SURGERY TIMES CAN CHANGE THE PREOP NURSE WILL GIVE THE ARRIVAL TIME WHEN SHE/HE CALLS.  THE PREOP NURSE WILL CALL, SOMETIMES AS LATE AS 4 PM IN THE AFTERNOON THE DAY BEFORE SURGERY.        Special Instruction: Bowel Prep

## 2018-04-17 RX ORDER — ATORVASTATIN CALCIUM 20 MG/1
TABLET, FILM COATED ORAL
Qty: 90 TABLET | Refills: 3 | Status: SHIPPED | OUTPATIENT
Start: 2018-04-17 | End: 2019-04-02 | Stop reason: SDUPTHER

## 2018-05-17 PROBLEM — Z12.11 SCREEN FOR COLON CANCER: Status: ACTIVE | Noted: 2018-05-17

## 2018-06-18 RX ORDER — DAPAGLIFLOZIN 10 MG/1
TABLET, FILM COATED ORAL
Qty: 90 TABLET | Refills: 1 | Status: SHIPPED | OUTPATIENT
Start: 2018-06-18 | End: 2019-01-10 | Stop reason: SDUPTHER

## 2018-07-12 RX ORDER — SITAGLIPTIN AND METFORMIN HYDROCHLORIDE 1000; 50 MG/1; MG/1
TABLET, FILM COATED ORAL
Qty: 180 TABLET | Refills: 1 | Status: SHIPPED | OUTPATIENT
Start: 2018-07-12 | End: 2019-01-04 | Stop reason: SDUPTHER

## 2018-08-07 ENCOUNTER — OFFICE VISIT (OUTPATIENT)
Dept: PRIMARY CARE CLINIC | Facility: CLINIC | Age: 53
End: 2018-08-07
Payer: COMMERCIAL

## 2018-08-07 VITALS
DIASTOLIC BLOOD PRESSURE: 69 MMHG | TEMPERATURE: 99 F | WEIGHT: 246.13 LBS | BODY MASS INDEX: 34.46 KG/M2 | SYSTOLIC BLOOD PRESSURE: 112 MMHG | HEART RATE: 77 BPM | OXYGEN SATURATION: 98 % | HEIGHT: 71 IN | RESPIRATION RATE: 18 BRPM

## 2018-08-07 DIAGNOSIS — E78.2 DM TYPE 2 WITH DIABETIC MIXED HYPERLIPIDEMIA: ICD-10-CM

## 2018-08-07 DIAGNOSIS — G62.9 NEUROPATHY: ICD-10-CM

## 2018-08-07 DIAGNOSIS — S16.1XXA STRAIN OF NECK MUSCLE, INITIAL ENCOUNTER: Primary | ICD-10-CM

## 2018-08-07 DIAGNOSIS — I10 HYPERTENSION, UNSPECIFIED TYPE: ICD-10-CM

## 2018-08-07 DIAGNOSIS — E11.69 DM TYPE 2 WITH DIABETIC MIXED HYPERLIPIDEMIA: ICD-10-CM

## 2018-08-07 DIAGNOSIS — F41.9 ANXIETY: ICD-10-CM

## 2018-08-07 PROCEDURE — 3008F BODY MASS INDEX DOCD: CPT | Mod: CPTII,S$GLB,, | Performed by: FAMILY MEDICINE

## 2018-08-07 PROCEDURE — 96372 THER/PROPH/DIAG INJ SC/IM: CPT | Mod: S$GLB,,, | Performed by: FAMILY MEDICINE

## 2018-08-07 PROCEDURE — 3078F DIAST BP <80 MM HG: CPT | Mod: CPTII,S$GLB,, | Performed by: FAMILY MEDICINE

## 2018-08-07 PROCEDURE — 3045F PR MOST RECENT HEMOGLOBIN A1C LEVEL 7.0-9.0%: CPT | Mod: CPTII,S$GLB,, | Performed by: FAMILY MEDICINE

## 2018-08-07 PROCEDURE — 99999 PR PBB SHADOW E&M-EST. PATIENT-LVL IV: CPT | Mod: PBBFAC,,, | Performed by: FAMILY MEDICINE

## 2018-08-07 PROCEDURE — 3074F SYST BP LT 130 MM HG: CPT | Mod: CPTII,S$GLB,, | Performed by: FAMILY MEDICINE

## 2018-08-07 PROCEDURE — 99213 OFFICE O/P EST LOW 20 MIN: CPT | Mod: 25,S$GLB,, | Performed by: FAMILY MEDICINE

## 2018-08-07 RX ORDER — CYCLOBENZAPRINE HCL 10 MG
10 TABLET ORAL 3 TIMES DAILY PRN
Qty: 30 TABLET | Refills: 5 | Status: SHIPPED | OUTPATIENT
Start: 2018-08-07 | End: 2018-08-17

## 2018-08-07 RX ORDER — HYDROCODONE BITARTRATE AND ACETAMINOPHEN 5; 325 MG/1; MG/1
1 TABLET ORAL EVERY 6 HOURS PRN
Qty: 30 TABLET | Refills: 0 | Status: SHIPPED | OUTPATIENT
Start: 2018-08-07 | End: 2019-06-10

## 2018-08-07 RX ORDER — BETAMETHASONE SODIUM PHOSPHATE AND BETAMETHASONE ACETATE 3; 3 MG/ML; MG/ML
12 INJECTION, SUSPENSION INTRA-ARTICULAR; INTRALESIONAL; INTRAMUSCULAR; SOFT TISSUE
Status: COMPLETED | OUTPATIENT
Start: 2018-08-07 | End: 2018-08-07

## 2018-08-07 RX ORDER — IBUPROFEN 600 MG/1
600 TABLET ORAL 3 TIMES DAILY
Qty: 60 TABLET | Refills: 5 | Status: SHIPPED | OUTPATIENT
Start: 2018-08-07 | End: 2019-06-10

## 2018-08-07 RX ADMIN — BETAMETHASONE SODIUM PHOSPHATE AND BETAMETHASONE ACETATE 12 MG: 3; 3 INJECTION, SUSPENSION INTRA-ARTICULAR; INTRALESIONAL; INTRAMUSCULAR; SOFT TISSUE at 05:08

## 2018-08-07 NOTE — PROGRESS NOTES
Subjective:       Patient ID: Cedrick Saunders is a 52 y.o. male.    Chief Complaint: Neck Pain    HPI:  A 52-year-old white male in for neck pain. Had neck pain times 3-4 weeks--woke up in the middle of the night--rolled over felt a shocking sensation--for couple of days unable sleep unless was in a recliner upright.  One slight flap was unable lift head off of the bed.  When patient is upright does better can feel some pain with lateral flexion rotation to the right on the left side of the neck--base of the skull--feels the pain with movement but unable to localize with his finger to pinpoint the exact spot.  No history of prior neck pain--no history of lupus rheumatoid gout.  Had bilateral knee replacement.    ROS:  Skin: no psoriasis, eczema, skin cancer  HEENT: + headache--would neck is hurting badly,no  ocular pain, blurred vision, diplopia, epistaxis, hoarseness change in voice, thyroid trouble  Lung: No pneumonia, asthma, Tb, wheezing, SOB, no smoking  Heart: No chest pain, ankle edema, palpitations, MI, linn murmur, +hypertension, +hyperlipidemia  Abdomen: No nausea, vomiting, diarrhea, constipation, ulcers, hepatitis, gallbladder disease, melena, hematochezia, hematemesis  : no UTI, renal disease, +stones right at the Karine ,no  prostate  MS: no fractures, O/A, lupus, rheumatoid, gout  Neuro: No dizziness, LOC, seizures   + diabetes--last month and a half --due to change in diet doing much better, no anemia, + anxiety--13 yrs ago son killed by lightening strike--, no depression   Still in medications-hard to cope   , 3 children including 1 that , work  for commercial [employer], lives with wife And youngest daughter in nursing school     Physical Exam:  General: Well nourished, well developed, no acute distress  Skin: No lesions  HEENT: Eyes PERRLA, EOM intact, nose patent, throat non-erythematous ears TM clear  NECK: Supple, no bruits, No JVD, no nodes  Lungs:  Clear, no rales, rhonchi, wheezing  Heart: Regular rate and rhythm, no murmurs, gallops, or rubs  Abdomen: flat, bowel sounds positive, no tenderness, or organomegaly  MS:  Scars on knees bilaterally secondary bilateral knee replacement in the past, tenderness cervical spine C3 through 7 especially on the left in the posterior cervical area pain especially with lateral flexion and lateral rotation to the left producing pain in the left side of neck able raise arms overhead good opposition thumb index finger from 5th digit and opposition to flexion-extension of forearms  All are intact  Neuro: Alert, CN intact, oriented X 3  Extremities: No cyanosis, clubbing, or edema         Assessment:       1. Strain of neck muscle, initial encounter    2. Neuropathy        Plan:       Strain of neck muscle, initial encounter    Neuropathy      Moist heat, Theragesic  range-of-motion exercises  Celestone /Norco/ibuprofen/flexoril  X-ray cervical spine  Return in 2 weeks see Dr. Mckoy--if glucose remained in fair control with Aylin own trap may consider Medrol Dosepak--may need MRI of the cervical spine if pain persists for 6 weeks may benefit from physical therapy if fails to improve

## 2018-08-09 RX ORDER — ESCITALOPRAM OXALATE 10 MG/1
TABLET ORAL
Qty: 90 TABLET | Refills: 1 | Status: SHIPPED | OUTPATIENT
Start: 2018-08-09 | End: 2018-10-19 | Stop reason: SDUPTHER

## 2018-08-15 ENCOUNTER — TELEPHONE (OUTPATIENT)
Dept: PRIMARY CARE CLINIC | Facility: CLINIC | Age: 53
End: 2018-08-15

## 2018-08-15 NOTE — TELEPHONE ENCOUNTER
----- Message from Tess Solomon sent at 8/15/2018  2:08 PM CDT -----  Contact: self  Type:  Patient Returning Call    Who Called:  self  Who Left Message for Patient:  No name left  Does the patient know what this is regarding?:  no  Best Call Back Number:  [phone]  Additional Information:  Asking patient to call back. Thanks!

## 2018-08-24 ENCOUNTER — OFFICE VISIT (OUTPATIENT)
Dept: PRIMARY CARE CLINIC | Facility: CLINIC | Age: 53
End: 2018-08-24
Payer: COMMERCIAL

## 2018-08-24 ENCOUNTER — CLINICAL SUPPORT (OUTPATIENT)
Dept: PRIMARY CARE CLINIC | Facility: CLINIC | Age: 53
End: 2018-08-24
Payer: COMMERCIAL

## 2018-08-24 VITALS
TEMPERATURE: 98 F | OXYGEN SATURATION: 98 % | WEIGHT: 246 LBS | SYSTOLIC BLOOD PRESSURE: 117 MMHG | HEART RATE: 71 BPM | HEIGHT: 71 IN | RESPIRATION RATE: 18 BRPM | BODY MASS INDEX: 34.44 KG/M2 | DIASTOLIC BLOOD PRESSURE: 74 MMHG

## 2018-08-24 DIAGNOSIS — I10 ESSENTIAL HYPERTENSION: ICD-10-CM

## 2018-08-24 DIAGNOSIS — M50.30 DDD (DEGENERATIVE DISC DISEASE), CERVICAL: Primary | ICD-10-CM

## 2018-08-24 DIAGNOSIS — E78.2 DM TYPE 2 WITH DIABETIC MIXED HYPERLIPIDEMIA: ICD-10-CM

## 2018-08-24 DIAGNOSIS — E11.69 DM TYPE 2 WITH DIABETIC MIXED HYPERLIPIDEMIA: ICD-10-CM

## 2018-08-24 PROBLEM — Z12.11 SCREEN FOR COLON CANCER: Status: RESOLVED | Noted: 2018-05-17 | Resolved: 2018-08-24

## 2018-08-24 LAB
ANION GAP SERPL CALC-SCNC: 8 MMOL/L
BUN SERPL-MCNC: 21 MG/DL
CALCIUM SERPL-MCNC: 9.1 MG/DL
CHLORIDE SERPL-SCNC: 103 MMOL/L
CO2 SERPL-SCNC: 26 MMOL/L
CREAT SERPL-MCNC: 0.6 MG/DL
EST. GFR  (AFRICAN AMERICAN): >60 ML/MIN/1.73 M^2
EST. GFR  (NON AFRICAN AMERICAN): >60 ML/MIN/1.73 M^2
ESTIMATED AVG GLUCOSE: 186 MG/DL
GLUCOSE SERPL-MCNC: 134 MG/DL
HBA1C MFR BLD HPLC: 8.1 %
POTASSIUM SERPL-SCNC: 4.1 MMOL/L
SODIUM SERPL-SCNC: 137 MMOL/L

## 2018-08-24 PROCEDURE — 3045F PR MOST RECENT HEMOGLOBIN A1C LEVEL 7.0-9.0%: CPT | Mod: CPTII,S$GLB,, | Performed by: FAMILY MEDICINE

## 2018-08-24 PROCEDURE — 99999 PR PBB SHADOW E&M-EST. PATIENT-LVL III: CPT | Mod: PBBFAC,,, | Performed by: FAMILY MEDICINE

## 2018-08-24 PROCEDURE — 99214 OFFICE O/P EST MOD 30 MIN: CPT | Mod: S$GLB,,, | Performed by: FAMILY MEDICINE

## 2018-08-24 PROCEDURE — 83036 HEMOGLOBIN GLYCOSYLATED A1C: CPT

## 2018-08-24 PROCEDURE — 3074F SYST BP LT 130 MM HG: CPT | Mod: CPTII,S$GLB,, | Performed by: FAMILY MEDICINE

## 2018-08-24 PROCEDURE — 3078F DIAST BP <80 MM HG: CPT | Mod: CPTII,S$GLB,, | Performed by: FAMILY MEDICINE

## 2018-08-24 PROCEDURE — 99999 PR PBB SHADOW E&M-EST. PATIENT-LVL II: CPT | Mod: PBBFAC,,,

## 2018-08-24 PROCEDURE — 3008F BODY MASS INDEX DOCD: CPT | Mod: CPTII,S$GLB,, | Performed by: FAMILY MEDICINE

## 2018-08-24 NOTE — PROGRESS NOTES
Subjective:       Patient ID: Cedrick Saunders is a 52 y.o. male.    Chief Complaint: Neck Pain (x1 month. Patient say Dr. Hunt on 8/8 and had an xray donea and told he has bone spurs ) and Diabetes (patient wants to do labs while he is here)    Neck Pain    This is a new problem. The current episode started more than 1 month ago. The problem occurs constantly. The problem has been gradually improving. 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 The pain is present in the left side. The quality of the pain is described as burning. The pain is moderate. The symptoms are aggravated by position and bending. Associated symptoms include numbness and tingling. Pertinent negatives include no chest pain, fever, trouble swallowing or weakness. He has tried muscle relaxants and NSAIDs for the symptoms. The treatment provided moderate relief.   Diabetes   He presents for his follow-up diabetic visit. He has type 2 diabetes mellitus. His disease course has been improving. There are no hypoglycemic associated symptoms. Pertinent negatives for hypoglycemia include no confusion. There are no diabetic associated symptoms. Pertinent negatives for diabetes include no chest pain, no polydipsia, no polyuria and no weakness. There are no hypoglycemic complications. Risk factors for coronary artery disease include diabetes mellitus, dyslipidemia, hypertension and male sex. He is compliant with treatment all of the time. His weight is stable. He is following a diabetic diet. His breakfast blood glucose range is generally  mg/dl. An ACE inhibitor/angiotensin II receptor blocker is being taken.   Hypertension   This is a chronic problem. The current episode started more than 1 year ago. The problem is controlled. Associated symptoms include neck pain. Pertinent negatives include no chest pain or shortness of breath. There are no associated agents to hypertension. The current treatment provides  "significant improvement. There are no compliance problems.      Review of Systems   Constitutional: Negative for fever.   HENT: Negative for trouble swallowing.    Eyes: Negative for visual disturbance.   Respiratory: Negative for shortness of breath.    Cardiovascular: Negative for chest pain.   Endocrine: Negative for polydipsia and polyuria.   Genitourinary: Negative for difficulty urinating.   Musculoskeletal: Positive for neck pain.   Skin: Negative for rash.   Neurological: Positive for tingling and numbness. Negative for weakness.   Psychiatric/Behavioral: Negative for agitation and confusion.       Objective:      Vitals:    08/24/18 0800   BP: 117/74   BP Location: Left arm   Patient Position: Sitting   BP Method: Large (Automatic)   Pulse: 71   Resp: 18   Temp: 98.2 °F (36.8 °C)   TempSrc: Oral   SpO2: 98%   Weight: 111.6 kg (246 lb)   Height: 5' 11" (1.803 m)     Physical Exam   Constitutional: He is oriented to person, place, and time. He appears well-developed and well-nourished.   HENT:   Head: Normocephalic and atraumatic.   Cardiovascular: Normal rate, regular rhythm and normal heart sounds.   Pulses:       Dorsalis pedis pulses are 2+ on the right side, and 2+ on the left side.   Pulmonary/Chest: Effort normal and breath sounds normal.   Musculoskeletal: He exhibits no edema.        Cervical back: He exhibits normal range of motion, no bony tenderness and no deformity.   Feet:   Right Foot:   Protective Sensation: 9 sites tested. 9 sites sensed.   Skin Integrity: Positive for dry skin. Negative for ulcer, blister or skin breakdown.   Left Foot:   Protective Sensation: 9 sites tested. 9 sites sensed.   Skin Integrity: Positive for dry skin. Negative for ulcer, blister or skin breakdown.   Neurological: He is alert and oriented to person, place, and time. He has normal strength.   Reflex Scores:       Tricep reflexes are 1+ on the right side and 1+ on the left side.  Skin: Skin is warm and dry. "   Nursing note and vitals reviewed.      Assessment:       1. DDD (degenerative disc disease), cervical    2. DM type 2 with diabetic mixed hyperlipidemia    3. Essential hypertension        Plan:       DDD (degenerative disc disease), cervical  Comments:  responding to conservative tx, no further w/u needed unless regression/recurrence (MRI C-spine)    DM type 2 with diabetic mixed hyperlipidemia  Comments:  A1C should be better with recent dietary adjustments  Orders:  -      DIABETES FOOT EXAM    Essential hypertension  Comments:  well controlled, continue current Rx      Medication List with Changes/Refills   Current Medications    ASPIRIN (ECOTRIN) 81 MG EC TABLET    Take 81 mg by mouth once daily.    ATORVASTATIN (LIPITOR) 20 MG TABLET    TAKE 1 TABLET ONCE A DAY ORALLY FOR 90 DAYS    BASAGLAR KWIKPEN U-100 INSULIN 100 UNIT/ML (3 ML) INPN PEN    40 UNITS AT BEDTIME ONCE A DAY SUBCUTANEOUS    ESCITALOPRAM OXALATE (LEXAPRO) 10 MG TABLET    TAKE 1 TABLET ONCE A DAY ORALLY 90 DAY(S)    FARXIGA 10 MG TAB    TAKE 1 TABLET ONCE A DAY ORALLY    GLYBURIDE (DIABETA) 5 MG TABLET    TAKE 2 TABLETS TWICE A DAY ORALLY FOR 90 DAYS SUPPLY    HYDROCODONE-ACETAMINOPHEN (NORCO) 5-325 MG PER TABLET    Take 1 tablet by mouth every 6 (six) hours as needed.    IBUPROFEN (ADVIL,MOTRIN) 600 MG TABLET    Take 1 tablet (600 mg total) by mouth 3 (three) times daily.    JANUMET 50-1,000 MG PER TABLET    TAKE 1 TABLET WITH MEALS TWICE A DAY ORALLY 90 DAYS    LISINOPRIL-HYDROCHLOROTHIAZIDE (PRINZIDE,ZESTORETIC) 20-12.5 MG PER TABLET    TAKE 1 TABLET BY MOUTH EVERY DAY

## 2018-09-17 DIAGNOSIS — E78.2 DM TYPE 2 WITH DIABETIC MIXED HYPERLIPIDEMIA: Primary | ICD-10-CM

## 2018-09-17 DIAGNOSIS — E11.69 DM TYPE 2 WITH DIABETIC MIXED HYPERLIPIDEMIA: Primary | ICD-10-CM

## 2018-09-24 RX ORDER — GLYBURIDE 5 MG/1
TABLET ORAL
Qty: 360 TABLET | Refills: 1 | Status: SHIPPED | OUTPATIENT
Start: 2018-09-24 | End: 2019-03-22 | Stop reason: SDUPTHER

## 2018-09-24 RX ORDER — LISINOPRIL AND HYDROCHLOROTHIAZIDE 12.5; 2 MG/1; MG/1
TABLET ORAL
Qty: 90 TABLET | Refills: 1 | Status: SHIPPED | OUTPATIENT
Start: 2018-09-24 | End: 2019-03-22 | Stop reason: SDUPTHER

## 2018-10-01 RX ORDER — INSULIN GLARGINE 100 [IU]/ML
INJECTION, SOLUTION SUBCUTANEOUS
Qty: 30 SYRINGE | Refills: 3 | Status: SHIPPED | OUTPATIENT
Start: 2018-10-01 | End: 2019-07-16

## 2018-10-19 RX ORDER — ESCITALOPRAM OXALATE 10 MG/1
10 TABLET ORAL DAILY
Qty: 90 TABLET | Refills: 1 | Status: SHIPPED | OUTPATIENT
Start: 2018-10-19 | End: 2019-04-13 | Stop reason: SDUPTHER

## 2018-12-20 ENCOUNTER — OFFICE VISIT (OUTPATIENT)
Dept: PRIMARY CARE CLINIC | Facility: CLINIC | Age: 53
End: 2018-12-20
Payer: COMMERCIAL

## 2018-12-20 VITALS
OXYGEN SATURATION: 97 % | RESPIRATION RATE: 16 BRPM | SYSTOLIC BLOOD PRESSURE: 131 MMHG | DIASTOLIC BLOOD PRESSURE: 72 MMHG | WEIGHT: 248 LBS | TEMPERATURE: 98 F | BODY MASS INDEX: 33.59 KG/M2 | HEART RATE: 73 BPM | HEIGHT: 72 IN

## 2018-12-20 DIAGNOSIS — E78.2 MIXED HYPERLIPIDEMIA: ICD-10-CM

## 2018-12-20 DIAGNOSIS — Z23 NEED FOR VACCINATION: ICD-10-CM

## 2018-12-20 DIAGNOSIS — I10 ESSENTIAL HYPERTENSION: ICD-10-CM

## 2018-12-20 DIAGNOSIS — Z12.5 PROSTATE CANCER SCREENING: ICD-10-CM

## 2018-12-20 DIAGNOSIS — E78.2 DM TYPE 2 WITH DIABETIC MIXED HYPERLIPIDEMIA: Primary | ICD-10-CM

## 2018-12-20 DIAGNOSIS — E11.69 DM TYPE 2 WITH DIABETIC MIXED HYPERLIPIDEMIA: Primary | ICD-10-CM

## 2018-12-20 PROCEDURE — 3075F SYST BP GE 130 - 139MM HG: CPT | Mod: CPTII,S$GLB,, | Performed by: FAMILY MEDICINE

## 2018-12-20 PROCEDURE — 3008F BODY MASS INDEX DOCD: CPT | Mod: CPTII,S$GLB,, | Performed by: FAMILY MEDICINE

## 2018-12-20 PROCEDURE — 90471 IMMUNIZATION ADMIN: CPT | Mod: S$GLB,,, | Performed by: FAMILY MEDICINE

## 2018-12-20 PROCEDURE — 3045F PR MOST RECENT HEMOGLOBIN A1C LEVEL 7.0-9.0%: CPT | Mod: CPTII,S$GLB,, | Performed by: FAMILY MEDICINE

## 2018-12-20 PROCEDURE — 99999 PR PBB SHADOW E&M-EST. PATIENT-LVL III: CPT | Mod: PBBFAC,,, | Performed by: FAMILY MEDICINE

## 2018-12-20 PROCEDURE — 90686 IIV4 VACC NO PRSV 0.5 ML IM: CPT | Mod: S$GLB,,, | Performed by: FAMILY MEDICINE

## 2018-12-20 PROCEDURE — 3078F DIAST BP <80 MM HG: CPT | Mod: CPTII,S$GLB,, | Performed by: FAMILY MEDICINE

## 2018-12-20 PROCEDURE — 99214 OFFICE O/P EST MOD 30 MIN: CPT | Mod: 25,S$GLB,, | Performed by: FAMILY MEDICINE

## 2018-12-20 RX ORDER — METHYLPREDNISOLONE 4 MG/1
TABLET ORAL
Qty: 1 PACKAGE | Refills: 0 | Status: SHIPPED | OUTPATIENT
Start: 2018-12-20 | End: 2019-06-10

## 2018-12-20 RX ORDER — AZITHROMYCIN 250 MG/1
TABLET, FILM COATED ORAL
Qty: 6 TABLET | Refills: 0 | Status: SHIPPED | OUTPATIENT
Start: 2018-12-20 | End: 2018-12-25

## 2018-12-20 NOTE — PROGRESS NOTES
Verified by patient by name and . Allergies verified with patient. Administered Flu Vaccine 0.5cc IM to Right Deltoid using aseptic technique per physician's order. Aspirated with no blood noted. Patient tolerated well with no adverse effects.

## 2018-12-20 NOTE — PROGRESS NOTES
Subjective:       Patient ID: Cedrick Saunders is a 53 y.o. male.    Chief Complaint: Diabetes (Patient is here to follow up on lab results ) and Sore Throat (x5 days )    Diabetes - A1C continues to trend down, down to 7.7. Had been doing really well prior to holidays, fasting CBG's had been in low 100s, coming back down since eating better      Hypertension   This is a chronic problem. The current episode started more than 1 year ago. The problem is controlled. Pertinent negatives include no anxiety, blurred vision, chest pain, headaches, peripheral edema or shortness of breath. There are no associated agents to hypertension. Risk factors for coronary artery disease include diabetes mellitus, dyslipidemia and male gender. The current treatment provides significant improvement. There are no compliance problems.    Sore Throat    This is a new problem. The current episode started in the past 7 days. The problem has been unchanged. The pain is worse on the right side. There has been no fever. Pertinent negatives include no coughing, ear discharge, headaches, shortness of breath or vomiting. He has had no exposure to strep or mono. He has tried nothing for the symptoms.     Review of Systems   Constitutional: Negative for fever.   HENT: Positive for sore throat. Negative for ear discharge.    Eyes: Negative for blurred vision and visual disturbance.   Respiratory: Negative for cough and shortness of breath.    Cardiovascular: Negative for chest pain.   Gastrointestinal: Negative for vomiting.   Endocrine: Negative for polydipsia and polyuria.   Genitourinary: Negative for difficulty urinating.   Musculoskeletal: Negative for joint swelling.   Skin: Negative for rash.   Neurological: Negative for headaches.   Hematological: Does not bruise/bleed easily.   Psychiatric/Behavioral: Negative for agitation and confusion.       Objective:      Vitals:    12/20/18 0802   BP: 131/72   BP Location: Left arm   Patient  "Position: Sitting   BP Method: Large (Automatic)   Pulse: 73   Resp: 16   Temp: 98.3 °F (36.8 °C)   TempSrc: Oral   SpO2: 97%   Weight: 112.5 kg (248 lb)   Height: 5' 11.5" (1.816 m)     Physical Exam   Constitutional: He is oriented to person, place, and time. He appears well-developed and well-nourished.   HENT:   Head: Normocephalic and atraumatic.   Right Ear: External ear normal.   Left Ear: External ear normal.   Mouth/Throat: Oropharynx is clear and moist. No oropharyngeal exudate.   Cardiovascular: Normal rate, regular rhythm and normal heart sounds.   Pulmonary/Chest: Effort normal and breath sounds normal.   Musculoskeletal: He exhibits no edema.   Lymphadenopathy:     He has no cervical adenopathy.   Neurological: He is alert and oriented to person, place, and time.   Skin: Skin is warm and dry.   Psychiatric: He has a normal mood and affect. His behavior is normal.   Nursing note and vitals reviewed.      Assessment:       1. DM type 2 with diabetic mixed hyperlipidemia    2. Essential hypertension    3. Mixed hyperlipidemia    4. Need for vaccination    5. Prostate cancer screening        Plan:       DM type 2 with diabetic mixed hyperlipidemia  -     Comprehensive metabolic panel; Future; Expected date: 06/20/2019  -     Hemoglobin A1c; Future; Expected date: 06/20/2019  -     Microalbumin/creatinine urine ratio; Future; Expected date: 06/20/2019  Continue current POC, steadily improving  Essential hypertension  -     CBC auto differential; Future; Expected date: 06/20/2019  Well controlled  Mixed hyperlipidemia  -     Comprehensive metabolic panel; Future; Expected date: 06/20/2019  -     Lipid panel; Future; Expected date: 06/20/2019    Need for vaccination  -     Influenza - Quadrivalent (3 years & older) (PF)    Prostate cancer screening  -     PSA, Screening; Future; Expected date: 06/20/2019  Pharyngitis  Likely viral v. Allergies, use claritin prn       Medication List           Accurate as of " 12/20/18  8:18 AM. If you have any questions, ask your nurse or doctor.               CONTINUE taking these medications    aspirin 81 MG EC tablet  Commonly known as:  ECOTRIN     atorvastatin 20 MG tablet  Commonly known as:  LIPITOR  TAKE 1 TABLET ONCE A DAY ORALLY FOR 90 DAYS     BASAGLAR KWIKPEN U-100 INSULIN glargine 100 units/mL (3mL) SubQ pen  Generic drug:  insulin  INJECT 40 UNITS UNDER THE SKIN AT BEDTIME ONCE A DAY     blood sugar diagnostic Strp  1 strip by Misc.(Non-Drug; Combo Route) route once daily.     escitalopram oxalate 10 MG tablet  Commonly known as:  LEXAPRO  Take 1 tablet (10 mg total) by mouth once daily.     FARXIGA 10 mg Tab  Generic drug:  dapagliflozin  TAKE 1 TABLET ONCE A DAY ORALLY     glyBURIDE 5 MG tablet  Commonly known as:  DIABETA  TAKE 2 TABLETS TWICE A DAY ORALLY FOR 90 DAYS SUPPLY     HYDROcodone-acetaminophen 5-325 mg per tablet  Commonly known as:  NORCO  Take 1 tablet by mouth every 6 (six) hours as needed.     ibuprofen 600 MG tablet  Commonly known as:  ADVIL,MOTRIN  Take 1 tablet (600 mg total) by mouth 3 (three) times daily.     JANUMET 50-1,000 mg per tablet  Generic drug:  SITagliptan-metformin  TAKE 1 TABLET WITH MEALS TWICE A DAY ORALLY 90 DAYS     lisinopril-hydrochlorothiazide 20-12.5 mg per tablet  Commonly known as:  PRINZIDE,ZESTORETIC  TAKE 1 TABLET BY MOUTH EVERY DAY

## 2019-01-04 RX ORDER — SITAGLIPTIN AND METFORMIN HYDROCHLORIDE 1000; 50 MG/1; MG/1
TABLET, FILM COATED ORAL
Qty: 180 TABLET | Refills: 1 | Status: SHIPPED | OUTPATIENT
Start: 2019-01-04 | End: 2019-06-26 | Stop reason: SDUPTHER

## 2019-01-10 RX ORDER — DAPAGLIFLOZIN 10 MG/1
TABLET, FILM COATED ORAL
Qty: 90 TABLET | Refills: 1 | Status: SHIPPED | OUTPATIENT
Start: 2019-01-10 | End: 2019-07-10 | Stop reason: SDUPTHER

## 2019-03-22 RX ORDER — LISINOPRIL AND HYDROCHLOROTHIAZIDE 12.5; 2 MG/1; MG/1
TABLET ORAL
Qty: 90 TABLET | Refills: 1 | Status: SHIPPED | OUTPATIENT
Start: 2019-03-22 | End: 2019-09-26 | Stop reason: SDUPTHER

## 2019-03-22 RX ORDER — GLYBURIDE 5 MG/1
TABLET ORAL
Qty: 360 TABLET | Refills: 1 | Status: SHIPPED | OUTPATIENT
Start: 2019-03-22 | End: 2019-07-16

## 2019-04-02 RX ORDER — ATORVASTATIN CALCIUM 20 MG/1
TABLET, FILM COATED ORAL
Qty: 90 TABLET | Refills: 3 | Status: SHIPPED | OUTPATIENT
Start: 2019-04-02 | End: 2020-03-20 | Stop reason: SDUPTHER

## 2019-04-17 RX ORDER — ESCITALOPRAM OXALATE 10 MG/1
TABLET ORAL
Qty: 90 TABLET | Refills: 1 | Status: SHIPPED | OUTPATIENT
Start: 2019-04-17 | End: 2019-12-11 | Stop reason: SDUPTHER

## 2019-06-06 ENCOUNTER — PATIENT OUTREACH (OUTPATIENT)
Dept: ADMINISTRATIVE | Facility: HOSPITAL | Age: 54
End: 2019-06-06

## 2019-06-06 ENCOUNTER — TELEPHONE (OUTPATIENT)
Dept: ADMINISTRATIVE | Facility: HOSPITAL | Age: 54
End: 2019-06-06

## 2019-06-06 NOTE — TELEPHONE ENCOUNTER
Received return call. Patient advised he will need referral to Ophthalmologist for eye exam - has not had one recently. Patient states he is agreeable to fasting the night before appt and getting fasting labs done same day as follow up.

## 2019-06-06 NOTE — PROGRESS NOTES
Immunizations reviewed. Legacy reviewed. Attempted to contact patient to discuss/schedule overdue HM. Unable to leave a message on machine at this time. Message sent to patient via portal. Pre-visit chart review completed.

## 2019-06-10 ENCOUNTER — OFFICE VISIT (OUTPATIENT)
Dept: PRIMARY CARE CLINIC | Facility: CLINIC | Age: 54
End: 2019-06-10
Payer: COMMERCIAL

## 2019-06-10 VITALS
DIASTOLIC BLOOD PRESSURE: 70 MMHG | HEART RATE: 86 BPM | HEIGHT: 71 IN | SYSTOLIC BLOOD PRESSURE: 105 MMHG | BODY MASS INDEX: 34.86 KG/M2 | OXYGEN SATURATION: 98 % | WEIGHT: 249 LBS | RESPIRATION RATE: 19 BRPM

## 2019-06-10 DIAGNOSIS — E78.2 MIXED HYPERLIPIDEMIA: ICD-10-CM

## 2019-06-10 DIAGNOSIS — Z87.442 HISTORY OF NEPHROLITHIASIS: ICD-10-CM

## 2019-06-10 DIAGNOSIS — E11.69 DM TYPE 2 WITH DIABETIC MIXED HYPERLIPIDEMIA: ICD-10-CM

## 2019-06-10 DIAGNOSIS — J40 BRONCHITIS: Primary | ICD-10-CM

## 2019-06-10 DIAGNOSIS — M50.30 DDD (DEGENERATIVE DISC DISEASE), CERVICAL: ICD-10-CM

## 2019-06-10 DIAGNOSIS — I10 ESSENTIAL HYPERTENSION: ICD-10-CM

## 2019-06-10 DIAGNOSIS — J32.9 SINUSITIS, UNSPECIFIED CHRONICITY, UNSPECIFIED LOCATION: ICD-10-CM

## 2019-06-10 DIAGNOSIS — F41.9 ANXIETY: ICD-10-CM

## 2019-06-10 DIAGNOSIS — E78.2 DM TYPE 2 WITH DIABETIC MIXED HYPERLIPIDEMIA: ICD-10-CM

## 2019-06-10 PROCEDURE — 3045F PR MOST RECENT HEMOGLOBIN A1C LEVEL 7.0-9.0%: CPT | Mod: CPTII,S$GLB,, | Performed by: FAMILY MEDICINE

## 2019-06-10 PROCEDURE — 99999 PR PBB SHADOW E&M-EST. PATIENT-LVL III: CPT | Mod: PBBFAC,,, | Performed by: FAMILY MEDICINE

## 2019-06-10 PROCEDURE — 99213 PR OFFICE/OUTPT VISIT, EST, LEVL III, 20-29 MIN: ICD-10-PCS | Mod: 25,S$GLB,, | Performed by: FAMILY MEDICINE

## 2019-06-10 PROCEDURE — 99213 OFFICE O/P EST LOW 20 MIN: CPT | Mod: 25,S$GLB,, | Performed by: FAMILY MEDICINE

## 2019-06-10 PROCEDURE — 3045F PR MOST RECENT HEMOGLOBIN A1C LEVEL 7.0-9.0%: ICD-10-PCS | Mod: CPTII,S$GLB,, | Performed by: FAMILY MEDICINE

## 2019-06-10 PROCEDURE — 99999 PR PBB SHADOW E&M-EST. PATIENT-LVL III: ICD-10-PCS | Mod: PBBFAC,,, | Performed by: FAMILY MEDICINE

## 2019-06-10 PROCEDURE — 3074F PR MOST RECENT SYSTOLIC BLOOD PRESSURE < 130 MM HG: ICD-10-PCS | Mod: CPTII,S$GLB,, | Performed by: FAMILY MEDICINE

## 2019-06-10 PROCEDURE — 3008F BODY MASS INDEX DOCD: CPT | Mod: CPTII,S$GLB,, | Performed by: FAMILY MEDICINE

## 2019-06-10 PROCEDURE — 3074F SYST BP LT 130 MM HG: CPT | Mod: CPTII,S$GLB,, | Performed by: FAMILY MEDICINE

## 2019-06-10 PROCEDURE — 3078F DIAST BP <80 MM HG: CPT | Mod: CPTII,S$GLB,, | Performed by: FAMILY MEDICINE

## 2019-06-10 PROCEDURE — 96372 PR INJECTION,THERAP/PROPH/DIAG2ST, IM OR SUBCUT: ICD-10-PCS | Mod: S$GLB,,, | Performed by: FAMILY MEDICINE

## 2019-06-10 PROCEDURE — 96372 THER/PROPH/DIAG INJ SC/IM: CPT | Mod: S$GLB,,, | Performed by: FAMILY MEDICINE

## 2019-06-10 PROCEDURE — 3078F PR MOST RECENT DIASTOLIC BLOOD PRESSURE < 80 MM HG: ICD-10-PCS | Mod: CPTII,S$GLB,, | Performed by: FAMILY MEDICINE

## 2019-06-10 PROCEDURE — 3008F PR BODY MASS INDEX (BMI) DOCUMENTED: ICD-10-PCS | Mod: CPTII,S$GLB,, | Performed by: FAMILY MEDICINE

## 2019-06-10 RX ORDER — TRIAMCINOLONE ACETONIDE 40 MG/ML
40 INJECTION, SUSPENSION INTRA-ARTICULAR; INTRAMUSCULAR ONCE
Status: COMPLETED | OUTPATIENT
Start: 2019-06-10 | End: 2019-06-10

## 2019-06-10 RX ORDER — LEVOFLOXACIN 500 MG/1
500 TABLET, FILM COATED ORAL DAILY
Qty: 10 TABLET | Refills: 0 | Status: SHIPPED | OUTPATIENT
Start: 2019-06-10 | End: 2019-06-20

## 2019-06-10 RX ORDER — PROMETHAZINE HYDROCHLORIDE AND CODEINE PHOSPHATE 6.25; 1 MG/5ML; MG/5ML
5 SOLUTION ORAL EVERY 6 HOURS PRN
Qty: 180 ML | Refills: 0 | Status: SHIPPED | OUTPATIENT
Start: 2019-06-10 | End: 2019-07-03

## 2019-06-10 RX ORDER — METHYLPREDNISOLONE 4 MG/1
TABLET ORAL
Qty: 1 PACKAGE | Refills: 0 | Status: SHIPPED | OUTPATIENT
Start: 2019-06-10 | End: 2019-07-03

## 2019-06-10 RX ADMIN — TRIAMCINOLONE ACETONIDE 40 MG: 40 INJECTION, SUSPENSION INTRA-ARTICULAR; INTRAMUSCULAR at 05:06

## 2019-06-10 NOTE — PROGRESS NOTES
Subjective:       Patient ID: Cedrick Saunders is a 53 y.o. male.    Chief Complaint: Cough (congestion) and Sore Throat    HPI:  52 yo WM --patient in for cold--woke up Saturday 2 days ago with a bad sore throat--+ light fever, +runny stuffy nose more congested, +postnasal drip, +sore th, +cough--when cough burns in his chest.  No pneumonia asthma TB.  No smoking    ROS:  Skin: no psoriasis, eczema, skin cancer  HEENT:  No headache no  ocular pain, blurred vision, diplopia, epistaxis, +hoarseness +change in voice,no  thyroid trouble  Lung: No pneumonia, asthma, Tb, wheezing, SOB, no smoking  Heart: No chest pain, ankle edema, palpitations, MI, linn murmur, no hypertension, +hyperlipidemia --no stent bypass arrhythmia  Abdomen: No nausea, vomiting, diarrhea, constipation, ulcers, hepatitis, gallbladder disease, melena, hematochezia, hematemesis  : no UTI, renal disease, +stones right at the Karine ,no  prostate  MS: no fractures, O/A, lupus, rheumatoid, gout  Neuro: No dizziness, LOC, seizures   + diabetes---lab scheduled  no anemia, + anxiety--13 yrs ago son killed by lightening strike--, no depression   Still in medications-hard to cope   , 3 children including 1 that , work  for commercial [employer], lives with wife And youngest daughter in nursing school     Physical Exam:  General: Well nourished, well developed, no acute distress  Skin: No lesions  HEENT: Eyes PERRLA, EOM intact, nose clear discharge, throat 1/4 erythematous ears TM clear  NECK: Supple, no bruits, No JVD, no nodes  Lungs: Clear, no rales, rhonchi, wheezing +coarse cough  Heart: Regular rate and rhythm, no murmurs, gallops, or rubs  Abdomen: flat, bowel sounds positive, no tenderness, or organomegaly  MS:  Decreased knee reflexes secondary bilateral knee replacements--occasional neck  Neuro: Alert, CN intact, oriented X 3  Extremities: No cyanosis, clubbing, or edema         Assessment:       1.  Bronchitis    2. Sinusitis, unspecified chronicity, unspecified location    3. Anxiety    4. DM type 2 with diabetic mixed hyperlipidemia    5. Essential hypertension    6. Mixed hyperlipidemia    7. DDD (degenerative disc disease), cervical    8. History of nephrolithiasis        Plan:       Bronchitis    Sinusitis, unspecified chronicity, unspecified location    Anxiety    DM type 2 with diabetic mixed hyperlipidemia    Essential hypertension    Mixed hyperlipidemia    DDD (degenerative disc disease), cervical    History of nephrolithiasis    Other orders  -     triamcinolone acetonide injection 40 mg  -     levoFLOXacin (LEVAQUIN) 500 MG tablet; Take 1 tablet (500 mg total) by mouth once daily. for 10 days  Dispense: 10 tablet; Refill: 0  -     methylPREDNISolone (MEDROL DOSEPACK) 4 mg tablet; use as directed  Dispense: 1 Package; Refill: 0  -     promethazine-codeine 6.25-10 mg/5 ml (PHENERGAN WITH CODEINE) 6.25-10 mg/5 mL syrup; Take 5 mLs by mouth every 6 (six) hours as needed for Cough.  Dispense: 180 mL; Refill: 0      Kenalog/Levaquin 500 q.d. times 10 day/Medrol Dosepak/Phenergan with codeine   Has appointment July 3rd to get labs done to evaluate has diabetes--may be slightly skewed from steroids

## 2019-06-10 NOTE — PROGRESS NOTES
Verified pt ID using name and . NKDA. Administered 40 mg kenalog in right VG per physician order using aseptic technique. Aspirated and no blood return noted. Pt tolerated well with no adverse reactions noted.

## 2019-06-26 RX ORDER — SITAGLIPTIN AND METFORMIN HYDROCHLORIDE 1000; 50 MG/1; MG/1
TABLET, FILM COATED ORAL
Qty: 180 TABLET | Refills: 1 | Status: SHIPPED | OUTPATIENT
Start: 2019-06-26 | End: 2019-07-16

## 2019-07-03 ENCOUNTER — OFFICE VISIT (OUTPATIENT)
Dept: PRIMARY CARE CLINIC | Facility: CLINIC | Age: 54
End: 2019-07-03
Payer: COMMERCIAL

## 2019-07-03 ENCOUNTER — CLINICAL SUPPORT (OUTPATIENT)
Dept: PRIMARY CARE CLINIC | Facility: CLINIC | Age: 54
End: 2019-07-03
Payer: COMMERCIAL

## 2019-07-03 VITALS
DIASTOLIC BLOOD PRESSURE: 74 MMHG | HEART RATE: 69 BPM | WEIGHT: 246 LBS | OXYGEN SATURATION: 98 % | TEMPERATURE: 99 F | SYSTOLIC BLOOD PRESSURE: 117 MMHG | HEIGHT: 71 IN | RESPIRATION RATE: 18 BRPM | BODY MASS INDEX: 34.44 KG/M2

## 2019-07-03 DIAGNOSIS — E11.69 DM TYPE 2 WITH DIABETIC MIXED HYPERLIPIDEMIA: Primary | ICD-10-CM

## 2019-07-03 DIAGNOSIS — N52.9 ERECTILE DYSFUNCTION, UNSPECIFIED ERECTILE DYSFUNCTION TYPE: ICD-10-CM

## 2019-07-03 DIAGNOSIS — E78.2 DM TYPE 2 WITH DIABETIC MIXED HYPERLIPIDEMIA: ICD-10-CM

## 2019-07-03 DIAGNOSIS — Z12.5 PROSTATE CANCER SCREENING: ICD-10-CM

## 2019-07-03 DIAGNOSIS — I10 ESSENTIAL HYPERTENSION: ICD-10-CM

## 2019-07-03 DIAGNOSIS — E78.2 MIXED HYPERLIPIDEMIA: ICD-10-CM

## 2019-07-03 DIAGNOSIS — E78.2 DM TYPE 2 WITH DIABETIC MIXED HYPERLIPIDEMIA: Primary | ICD-10-CM

## 2019-07-03 DIAGNOSIS — E11.69 DM TYPE 2 WITH DIABETIC MIXED HYPERLIPIDEMIA: ICD-10-CM

## 2019-07-03 LAB
ALBUMIN SERPL BCP-MCNC: 4.2 G/DL (ref 3.5–5.2)
ALBUMIN/CREAT UR: 7.9 UG/MG (ref 0–30)
ALP SERPL-CCNC: 53 U/L (ref 38–126)
ALT SERPL W/O P-5'-P-CCNC: 25 U/L (ref 17–63)
ANION GAP SERPL CALC-SCNC: 9 MMOL/L (ref 8–16)
AST SERPL-CCNC: 14 U/L (ref 15–41)
BASOPHILS # BLD AUTO: 0.1 K/UL (ref 0–0.2)
BASOPHILS NFR BLD: 3 % (ref 0–1.9)
BILIRUB SERPL-MCNC: 0.6 MG/DL (ref 0.3–1.2)
BUN SERPL-MCNC: 24 MG/DL (ref 6–20)
CALCIUM SERPL-MCNC: 9.2 MG/DL (ref 8.6–10)
CHLORIDE SERPL-SCNC: 102 MMOL/L (ref 101–111)
CHOLEST SERPL-MCNC: 118 MG/DL (ref 80–200)
CHOLEST/HDLC SERPL: 3.2 {RATIO} (ref 2–5)
CO2 SERPL-SCNC: 26 MMOL/L (ref 23–29)
COMPLEXED PSA SERPL-MCNC: 2.5 NG/ML (ref 0–4)
CREAT SERPL-MCNC: 0.6 MG/DL (ref 0.5–1.4)
CREAT UR-MCNC: 89 MG/DL (ref 23–375)
DIFFERENTIAL METHOD: ABNORMAL
EOSINOPHIL # BLD AUTO: 0.1 K/UL (ref 0–0.5)
EOSINOPHIL NFR BLD: 3.1 % (ref 0–8)
ERYTHROCYTE [DISTWIDTH] IN BLOOD BY AUTOMATED COUNT: 13.5 % (ref 11.5–14.5)
EST. GFR  (AFRICAN AMERICAN): >60 ML/MIN/1.73 M^2
EST. GFR  (NON AFRICAN AMERICAN): >60 ML/MIN/1.73 M^2
ESTIMATED AVG GLUCOSE: 223 MG/DL (ref 68–131)
GLUCOSE SERPL-MCNC: 173 MG/DL (ref 74–118)
HBA1C MFR BLD HPLC: 9.4 % (ref 4–5.6)
HCT VFR BLD AUTO: 44.1 % (ref 40–54)
HDLC SERPL-MCNC: 37 MG/DL (ref 40–75)
HDLC SERPL: 31.4 % (ref 20–50)
HGB BLD-MCNC: 14.7 G/DL (ref 14–18)
LDLC SERPL CALC-MCNC: 67 MG/DL
LYMPHOCYTES # BLD AUTO: 0.9 K/UL (ref 1–4.8)
LYMPHOCYTES NFR BLD: 21.4 % (ref 18–48)
MCH RBC QN AUTO: 30.4 PG (ref 27–31)
MCHC RBC AUTO-ENTMCNC: 33.4 G/DL (ref 32–36)
MCV RBC AUTO: 91 FL (ref 82–98)
MICROALBUMIN UR DL<=1MG/L-MCNC: 7 UG/ML
MONOCYTES # BLD AUTO: 0.3 K/UL (ref 0.3–1)
MONOCYTES NFR BLD: 7.7 % (ref 4–15)
NEUTROPHILS # BLD AUTO: 2.8 K/UL (ref 1.8–7.7)
NEUTROPHILS NFR BLD: 64.8 % (ref 38–73)
NONHDLC SERPL-MCNC: 81 MG/DL
PLATELET # BLD AUTO: 208 K/UL (ref 150–350)
PMV BLD AUTO: 9.7 FL (ref 9.2–12.9)
POTASSIUM SERPL-SCNC: 4.2 MMOL/L (ref 3.5–5.1)
PROT SERPL-MCNC: 7.4 G/DL (ref 6–8.4)
RBC # BLD AUTO: 4.85 M/UL (ref 4.6–6.2)
SODIUM SERPL-SCNC: 137 MMOL/L (ref 136–145)
TRIGL SERPL-MCNC: 71 MG/DL (ref 30–150)
WBC # BLD AUTO: 4.3 K/UL (ref 3.9–12.7)

## 2019-07-03 PROCEDURE — 99214 PR OFFICE/OUTPT VISIT, EST, LEVL IV, 30-39 MIN: ICD-10-PCS | Mod: S$GLB,,, | Performed by: FAMILY MEDICINE

## 2019-07-03 PROCEDURE — 99999 PR PBB SHADOW E&M-EST. PATIENT-LVL III: ICD-10-PCS | Mod: PBBFAC,,, | Performed by: FAMILY MEDICINE

## 2019-07-03 PROCEDURE — 84153 ASSAY OF PSA TOTAL: CPT

## 2019-07-03 PROCEDURE — 3008F PR BODY MASS INDEX (BMI) DOCUMENTED: ICD-10-PCS | Mod: CPTII,S$GLB,, | Performed by: FAMILY MEDICINE

## 2019-07-03 PROCEDURE — 3078F PR MOST RECENT DIASTOLIC BLOOD PRESSURE < 80 MM HG: ICD-10-PCS | Mod: CPTII,S$GLB,, | Performed by: FAMILY MEDICINE

## 2019-07-03 PROCEDURE — 82043 UR ALBUMIN QUANTITATIVE: CPT

## 2019-07-03 PROCEDURE — 85025 COMPLETE CBC W/AUTO DIFF WBC: CPT

## 2019-07-03 PROCEDURE — 99214 OFFICE O/P EST MOD 30 MIN: CPT | Mod: S$GLB,,, | Performed by: FAMILY MEDICINE

## 2019-07-03 PROCEDURE — 80061 LIPID PANEL: CPT

## 2019-07-03 PROCEDURE — 99999 PR PBB SHADOW E&M-EST. PATIENT-LVL III: CPT | Mod: PBBFAC,,, | Performed by: FAMILY MEDICINE

## 2019-07-03 PROCEDURE — 80053 COMPREHEN METABOLIC PANEL: CPT

## 2019-07-03 PROCEDURE — 83036 HEMOGLOBIN GLYCOSYLATED A1C: CPT

## 2019-07-03 PROCEDURE — 36415 PR COLLECTION VENOUS BLOOD,VENIPUNCTURE: ICD-10-PCS | Mod: S$GLB,,, | Performed by: FAMILY MEDICINE

## 2019-07-03 PROCEDURE — 3074F PR MOST RECENT SYSTOLIC BLOOD PRESSURE < 130 MM HG: ICD-10-PCS | Mod: CPTII,S$GLB,, | Performed by: FAMILY MEDICINE

## 2019-07-03 PROCEDURE — 3045F PR MOST RECENT HEMOGLOBIN A1C LEVEL 7.0-9.0%: CPT | Mod: CPTII,S$GLB,, | Performed by: FAMILY MEDICINE

## 2019-07-03 PROCEDURE — 3045F PR MOST RECENT HEMOGLOBIN A1C LEVEL 7.0-9.0%: ICD-10-PCS | Mod: CPTII,S$GLB,, | Performed by: FAMILY MEDICINE

## 2019-07-03 PROCEDURE — 36415 COLL VENOUS BLD VENIPUNCTURE: CPT | Mod: S$GLB,,, | Performed by: FAMILY MEDICINE

## 2019-07-03 PROCEDURE — 3078F DIAST BP <80 MM HG: CPT | Mod: CPTII,S$GLB,, | Performed by: FAMILY MEDICINE

## 2019-07-03 PROCEDURE — 3008F BODY MASS INDEX DOCD: CPT | Mod: CPTII,S$GLB,, | Performed by: FAMILY MEDICINE

## 2019-07-03 PROCEDURE — 3074F SYST BP LT 130 MM HG: CPT | Mod: CPTII,S$GLB,, | Performed by: FAMILY MEDICINE

## 2019-07-03 PROCEDURE — 82040 ASSAY OF SERUM ALBUMIN: CPT

## 2019-07-03 RX ORDER — TADALAFIL 20 MG/1
20 TABLET ORAL DAILY PRN
Qty: 30 TABLET | Refills: 11 | Status: SHIPPED | OUTPATIENT
Start: 2019-07-03 | End: 2019-12-26 | Stop reason: SDUPTHER

## 2019-07-03 NOTE — PROGRESS NOTES
"Subjective:       Patient ID: Cedrick Saunders is a 53 y.o. male.    Chief Complaint: No chief complaint on file.    Diabetes-blood sugar high recently while on steroids for treatment of bronchitis, but sugars have stabilized.  Fasting sugars mostly in the 130s to 140s, no hypoglycemia.  Compliant with medications.  Has not been as compliant with diet of late, eating more starchy foods, though trying to limit as much as possible.  Hypertension-no adverse side effects from medications  Erectile dysfunction-tried Viagra recently, did not seem to help.  No urinary symptoms.  No prior episodes.  No loss of libido.    Review of Systems   Constitutional: Positive for fatigue.   Respiratory: Negative for shortness of breath.    Cardiovascular: Negative for chest pain.   Endocrine: Positive for polydipsia and polyuria. Negative for polyphagia.   Genitourinary: Negative for difficulty urinating and dysuria.   Skin: Negative for pallor.   Neurological: Negative for dizziness, tremors, seizures, speech difficulty, weakness and headaches.   Psychiatric/Behavioral: Negative for confusion. The patient is not nervous/anxious.        Objective:      Vitals:    07/03/19 0810   BP: 117/74   BP Location: Left arm   Patient Position: Sitting   BP Method: Large (Automatic)   Pulse: 69   Resp: 18   Temp: 98.5 °F (36.9 °C)   TempSrc: Oral   SpO2: 98%   Weight: 111.6 kg (246 lb)   Height: 5' 11" (1.803 m)     Physical Exam   Constitutional: He is oriented to person, place, and time. He appears well-developed and well-nourished.   HENT:   Head: Normocephalic and atraumatic.   Cardiovascular: Normal rate, regular rhythm and normal heart sounds.   Pulses:       Dorsalis pedis pulses are 2+ on the right side, and 2+ on the left side.   Pulmonary/Chest: Effort normal and breath sounds normal.   Musculoskeletal: He exhibits no edema.   Feet:   Right Foot:   Protective Sensation: 10 sites tested. 10 sites sensed.   Skin Integrity: Negative " for ulcer, blister or skin breakdown.   Left Foot:   Protective Sensation: 10 sites tested. 10 sites sensed.   Skin Integrity: Negative for ulcer, blister or skin breakdown.   Neurological: He is alert and oriented to person, place, and time.   Skin: Skin is warm and dry.   Nursing note and vitals reviewed.      Assessment:       1. DM type 2 with diabetic mixed hyperlipidemia    2. Essential hypertension    3. Mixed hyperlipidemia    4. Erectile dysfunction, unspecified erectile dysfunction type        Plan:       DM type 2 with diabetic mixed hyperlipidemia  -      DIABETES FOOT EXAM  -     Ambulatory Referral to Ophthalmology  Labs today, adjust meds accordingly  Essential hypertension  Stable on current regimen  Mixed hyperlipidemia  Check lab today, adjust meds if needed  Erectile dysfunction, unspecified erectile dysfunction type  -     TESTOSTERONE PANEL; Future; Expected date: 07/03/2019  -     tadalafil (CIALIS) 20 MG Tab; Take 1 tablet (20 mg total) by mouth daily as needed.  Dispense: 30 tablet; Refill: 11  Switch from sildenafil to Cialis    Medication List with Changes/Refills   New Medications    TADALAFIL (CIALIS) 20 MG TAB    Take 1 tablet (20 mg total) by mouth daily as needed.   Current Medications    ASPIRIN (ECOTRIN) 81 MG EC TABLET    Take 81 mg by mouth once daily.    ATORVASTATIN (LIPITOR) 20 MG TABLET    TAKE 1 TABLET ONCE A DAY ORALLY FOR 90 DAYS    BASAGLAR KWIKPEN U-100 INSULIN 100 UNIT/ML (3 ML) INPN PEN    INJECT 40 UNITS UNDER THE SKIN AT BEDTIME ONCE A DAY    BLOOD SUGAR DIAGNOSTIC STRP    1 strip by Misc.(Non-Drug; Combo Route) route once daily.    ESCITALOPRAM OXALATE (LEXAPRO) 10 MG TABLET    TAKE 1 TABLET BY MOUTH EVERY DAY    FARXIGA 10 MG TAB    TAKE 1 TABLET ONCE A DAY ORALLY    GLYBURIDE (DIABETA) 5 MG TABLET    TAKE 2 TABLETS TWICE A DAY ORALLY FOR 90 DAYS SUPPLY    JANUMET 50-1,000 MG PER TABLET    TAKE 1 TABLET WITH MEALS TWICE A DAY ORALLY 90 DAYS     LISINOPRIL-HYDROCHLOROTHIAZIDE (PRINZIDE,ZESTORETIC) 20-12.5 MG PER TABLET    TAKE 1 TABLET BY MOUTH EVERY DAY   Discontinued Medications    METHYLPREDNISOLONE (MEDROL DOSEPACK) 4 MG TABLET    use as directed    PROMETHAZINE-CODEINE 6.25-10 MG/5 ML (PHENERGAN WITH CODEINE) 6.25-10 MG/5 ML SYRUP    Take 5 mLs by mouth every 6 (six) hours as needed for Cough.

## 2019-07-10 LAB
ALBUMIN SERPL-MCNC: 4.3 G/DL (ref 3.6–5.1)
SHBG SERPL-SCNC: 18 NMOL/L (ref 10–50)
TESTOST FREE SERPL-MCNC: 60.3 PG/ML (ref 46–224)
TESTOST SERPL-MCNC: 299 NG/DL (ref 250–1100)
TESTOSTERONE.FREE+WB SERPL-MCNC: 118.9 NG/DL (ref 110–575)

## 2019-07-10 RX ORDER — DAPAGLIFLOZIN 10 MG/1
TABLET, FILM COATED ORAL
Qty: 90 TABLET | Refills: 1 | Status: SHIPPED | OUTPATIENT
Start: 2019-07-10 | End: 2019-10-23 | Stop reason: SDUPTHER

## 2019-07-14 DIAGNOSIS — E11.69 DM TYPE 2 WITH DIABETIC MIXED HYPERLIPIDEMIA: Primary | ICD-10-CM

## 2019-07-14 DIAGNOSIS — E78.2 DM TYPE 2 WITH DIABETIC MIXED HYPERLIPIDEMIA: Primary | ICD-10-CM

## 2019-07-16 ENCOUNTER — OFFICE VISIT (OUTPATIENT)
Dept: DIABETES | Facility: CLINIC | Age: 54
End: 2019-07-16
Payer: COMMERCIAL

## 2019-07-16 ENCOUNTER — TELEPHONE (OUTPATIENT)
Dept: ADMINISTRATIVE | Facility: HOSPITAL | Age: 54
End: 2019-07-16

## 2019-07-16 VITALS
HEIGHT: 71 IN | WEIGHT: 244.19 LBS | HEART RATE: 72 BPM | BODY MASS INDEX: 34.19 KG/M2 | DIASTOLIC BLOOD PRESSURE: 72 MMHG | SYSTOLIC BLOOD PRESSURE: 110 MMHG

## 2019-07-16 DIAGNOSIS — E11.69 DM TYPE 2 WITH DIABETIC MIXED HYPERLIPIDEMIA: ICD-10-CM

## 2019-07-16 DIAGNOSIS — L25.9 CONTACT DERMATITIS, UNSPECIFIED CONTACT DERMATITIS TYPE, UNSPECIFIED TRIGGER: ICD-10-CM

## 2019-07-16 DIAGNOSIS — I10 ESSENTIAL HYPERTENSION: ICD-10-CM

## 2019-07-16 DIAGNOSIS — E66.9 OBESITY (BMI 30-39.9): ICD-10-CM

## 2019-07-16 DIAGNOSIS — E11.65 TYPE 2 DIABETES MELLITUS WITH HYPERGLYCEMIA, WITH LONG-TERM CURRENT USE OF INSULIN: Primary | ICD-10-CM

## 2019-07-16 DIAGNOSIS — E78.2 DM TYPE 2 WITH DIABETIC MIXED HYPERLIPIDEMIA: ICD-10-CM

## 2019-07-16 DIAGNOSIS — Z79.4 TYPE 2 DIABETES MELLITUS WITH HYPERGLYCEMIA, WITH LONG-TERM CURRENT USE OF INSULIN: Primary | ICD-10-CM

## 2019-07-16 DIAGNOSIS — E78.2 MIXED HYPERLIPIDEMIA: ICD-10-CM

## 2019-07-16 PROBLEM — G62.9 NEUROPATHY: Status: RESOLVED | Noted: 2018-08-07 | Resolved: 2019-07-16

## 2019-07-16 PROBLEM — J40 BRONCHITIS: Status: RESOLVED | Noted: 2019-06-10 | Resolved: 2019-07-16

## 2019-07-16 PROBLEM — J32.9 SINUSITIS: Status: RESOLVED | Noted: 2019-06-10 | Resolved: 2019-07-16

## 2019-07-16 PROCEDURE — 3074F SYST BP LT 130 MM HG: CPT | Mod: CPTII,S$GLB,, | Performed by: NURSE PRACTITIONER

## 2019-07-16 PROCEDURE — 3046F PR MOST RECENT HEMOGLOBIN A1C LEVEL > 9.0%: ICD-10-PCS | Mod: CPTII,S$GLB,, | Performed by: NURSE PRACTITIONER

## 2019-07-16 PROCEDURE — 99204 PR OFFICE/OUTPT VISIT, NEW, LEVL IV, 45-59 MIN: ICD-10-PCS | Mod: S$GLB,,, | Performed by: NURSE PRACTITIONER

## 2019-07-16 PROCEDURE — 3008F PR BODY MASS INDEX (BMI) DOCUMENTED: ICD-10-PCS | Mod: CPTII,S$GLB,, | Performed by: NURSE PRACTITIONER

## 2019-07-16 PROCEDURE — 99999 PR PBB SHADOW E&M-EST. PATIENT-LVL III: ICD-10-PCS | Mod: PBBFAC,,, | Performed by: NURSE PRACTITIONER

## 2019-07-16 PROCEDURE — 99204 OFFICE O/P NEW MOD 45 MIN: CPT | Mod: S$GLB,,, | Performed by: NURSE PRACTITIONER

## 2019-07-16 PROCEDURE — 99999 PR PBB SHADOW E&M-EST. PATIENT-LVL III: CPT | Mod: PBBFAC,,, | Performed by: NURSE PRACTITIONER

## 2019-07-16 PROCEDURE — 3078F DIAST BP <80 MM HG: CPT | Mod: CPTII,S$GLB,, | Performed by: NURSE PRACTITIONER

## 2019-07-16 PROCEDURE — 3074F PR MOST RECENT SYSTOLIC BLOOD PRESSURE < 130 MM HG: ICD-10-PCS | Mod: CPTII,S$GLB,, | Performed by: NURSE PRACTITIONER

## 2019-07-16 PROCEDURE — 3046F HEMOGLOBIN A1C LEVEL >9.0%: CPT | Mod: CPTII,S$GLB,, | Performed by: NURSE PRACTITIONER

## 2019-07-16 PROCEDURE — 3008F BODY MASS INDEX DOCD: CPT | Mod: CPTII,S$GLB,, | Performed by: NURSE PRACTITIONER

## 2019-07-16 PROCEDURE — 3078F PR MOST RECENT DIASTOLIC BLOOD PRESSURE < 80 MM HG: ICD-10-PCS | Mod: CPTII,S$GLB,, | Performed by: NURSE PRACTITIONER

## 2019-07-16 RX ORDER — PEN NEEDLE, DIABETIC 30 GX3/16"
NEEDLE, DISPOSABLE MISCELLANEOUS
Qty: 100 EACH | Refills: 3 | Status: SHIPPED | OUTPATIENT
Start: 2019-07-16 | End: 2020-10-13

## 2019-07-16 RX ORDER — TRIAMCINOLONE ACETONIDE 1 MG/G
CREAM TOPICAL 2 TIMES DAILY
Qty: 45 G | Refills: 0 | Status: SHIPPED | OUTPATIENT
Start: 2019-07-16 | End: 2019-10-23

## 2019-07-16 RX ORDER — METFORMIN HYDROCHLORIDE 1000 MG/1
1000 TABLET ORAL 2 TIMES DAILY WITH MEALS
Qty: 180 TABLET | Refills: 3 | Status: SHIPPED | OUTPATIENT
Start: 2019-07-16 | End: 2019-10-23 | Stop reason: SDUPTHER

## 2019-07-16 RX ORDER — INSULIN DEGLUDEC 200 U/ML
INJECTION, SOLUTION SUBCUTANEOUS
Qty: 6 SYRINGE | Refills: 3 | Status: SHIPPED | OUTPATIENT
Start: 2019-07-16 | End: 2019-10-23

## 2019-07-16 NOTE — LETTER
July 16, 2019    [facility]   [address] [id number]  P: [phone]  F: [phone]  July 16, 2019     Patient: Cedrick Saunders    YOB: 1965   Date of Visit: 7/16/2019         [facility]    We are seeing Cedrick Saunders in the clinic today at [facility].  Alex Mckoy MD is their PCP.  She/He has an outstanding lab/procedure at this time when reviewing their chart.  To help with our Health Maintenance records will you please supply the following:      []  Mammogram                          []  Colonoscopy   []  Pap Smear                             []  Outside Lab Results   []  Dexa scans                            [x]  Eye Exam   []  Foot Exam                             [] Other___________   []  Outside Immunizations                                                Please Fax to [facility] at [phone].    Thank you for your help, ADALID Marsh.  If I can be of any assistance you can call at [phone]

## 2019-07-16 NOTE — LETTER
July 16, 2019      Alex Mckoy MD  [address]  [address]  [place] [address]           [facility]  [address], [facility] [[email or web address] [address]  [place] [address]  Phone: [phone]  Fax: [phone]          Patient: Cedrick Saunders   MR Number: [id number]   YOB: 1965   Date of Visit: 7/16/2019       Dear Dr. Alex Mckoy:    Thank you for referring Cedrick Saunders to me for evaluation. Attached you will find relevant portions of my assessment and plan of care.    If you have questions, please do not hesitate to call me. I look forward to following Cedrick Saunders along with you.    Sincerely,    Lizzy Devi, TIFFANIE    Enclosure  CC:  No Recipients    If you would like to receive this communication electronically, please contact [email or web address] or [phone] to request more information on [employer] access.    For providers and/or their staff who would like to refer a patient to [facility], please contact us through our one-stop-shop provider referral line, [facility], at [phone].    If you feel you have received this communication in error or would no longer like to receive these types of communications, please e-mail [email or web address]

## 2019-07-16 NOTE — PROGRESS NOTES
CC:   Chief Complaint   Patient presents with    Diabetes Mellitus     type 2        HPI: Cedrick Saunders is a 53 y.o. male presents for an initial visit today for the management of T2DM.     He was diagnosed with Type 2 diabetes at age 37 with s/s of polyuria, nocturia, and polydipsia and BG was 600. A1c at diagnosis was > 12%. He was initially on oral medications Actos and Metformin. Insulin therapy was started in 2016. A1c has increased from 7.7% to 9.4%. He reports medication compliance but attest to dietary indiscretions that he is working on improving.     Family hx of diabetes: father, paternal grandmother   Hospitalized for diabetes: denies     No personal or FH of thyroid cancer or personal of pancreatic cancer or pancreatitis.     DIABETES COMPLICATIONS: none      Diabetes Management Status    ASA:  Yes - 81 mg daily     Statin: Taking  ACE/ARB: Taking    Screening or Prevention Patient's value Goal Complete/Controlled?   HgA1C Testing and Control   Lab Results   Component Value Date    HGBA1C 9.4 (H) 07/03/2019      Annually/Less than 8% No   Lipid profile : 07/03/2019 Annually Yes   LDL control Lab Results   Component Value Date    LDLCALC 67 07/03/2019    Annually/Less than 100 mg/dl  Yes   Nephropathy screening Lab Results   Component Value Date    LABMICR 7.0 07/03/2019     Lab Results   Component Value Date    PROTEINUA Trace (A) 05/19/2006    Annually Yes   Blood pressure BP Readings from Last 1 Encounters:   07/16/19 110/72    Less than 140/90 Yes   Dilated retinal exam Most Recent Eye Exam Date: Not Found- due for eye exam- [facility] [place]  Annually No   Foot exam   : 07/03/2019 Annually Yes       CURRENT A1C:    Hemoglobin A1C   Date Value Ref Range Status   07/03/2019 9.4 (H) 4.0 - 5.6 % Final     Comment:     ADA Screening Guidelines:  5.7-6.4%  Consistent with prediabetes  >or=6.5%  Consistent with diabetes  High levels of fetal hemoglobin interfere with the HbA1C  assay.  Heterozygous hemoglobin variants (HbS, HgC, etc)do  not significantly interfere with this assay.   However, presence of multiple variants may affect accuracy.     12/17/2018 7.7 (H) 4.0 - 5.6 % Final     Comment:     ADA Screening Guidelines:  5.7-6.4%  Consistent with prediabetes  >or=6.5%  Consistent with diabetes  High levels of fetal hemoglobin interfere with the HbA1C  assay. Heterozygous hemoglobin variants (HbS, HgC, etc)do  not significantly interfere with this assay.   However, presence of multiple variants may affect accuracy.     08/24/2018 8.1 (H) 4.0 - 5.6 % Final     Comment:     ADA Screening Guidelines:  5.7-6.4%  Consistent with prediabetes  >or=6.5%  Consistent with diabetes  High levels of fetal hemoglobin interfere with the HbA1C  assay. Heterozygous hemoglobin variants (HbS, HgC, etc)do  not significantly interfere with this assay.   However, presence of multiple variants may affect accuracy.         GOAL A1C: 7% or less without hypoglycemia.     DM MEDICATIONS USED IN THE PAST: Metformin, Januvia, Basaglar, Farxiga, Glyburide, Actos   Invokana- yeast infections.     CURRENT DIABETES MEDICATIONS: Janumet  mg BID, Glyburide 5 mg BID (not necessarily with food), Farxiga 10 mg daily, Basaglar 40 units nightly (8-9 PM)  Insulin: pens.    Missed doses: rarely     BLOOD GLUCOSE MONITORING:  Since A1c resulted he is checking 3-4 times per day.   No logs or meter to clinic today for review.   Per oral recall-   BG this AM was 123  yesterday  AM was 178  ~ 120-118, 150-140's     HYPOGLYCEMIA:  Yes-- a couple of week ago. - weak and diaphoretic at work.   Not frequently. When he takes the glyburide and doesn't eat.       MEALS: eating 3 meals per day   He attest to eating more bread over the past 6 months   BF: ~ haleigh's or par 3 -- breakfast special- eggs, grits, and sausage. -- been hold the toast recently.   Lunch: soup and salad.   Dinner: grilled or baked fish, occ fried shrimp. Now grilled  potatoes and grilled fish    Snack: after dinner on chips and popcorn. Or bowl of cereal.    Drinks: ~ stopped cokes.   Diet green tea or water.   Diet coke.      CURRENT EXERCISE:  No    Review of Systems  Review of Systems   Constitutional: Negative for appetite change, fatigue and unexpected weight change.   HENT: Negative for trouble swallowing.    Eyes: Negative for visual disturbance.   Respiratory: Negative for shortness of breath.    Cardiovascular: Negative for chest pain.   Gastrointestinal: Negative for nausea.   Endocrine: Positive for polyuria. Negative for polydipsia and polyphagia.   Genitourinary:        + Nocturia -- 2 times per night    Skin: Positive for rash (itchy rash to right arm- workign out side with fig leaves). Negative for wound.   Neurological: Negative for numbness.       Physical Exam   Physical Exam   Constitutional: He is oriented to person, place, and time. He appears well-developed and well-nourished.   Obese male patient    HENT:   Head: Normocephalic and atraumatic.   Right Ear: External ear normal.   Left Ear: External ear normal.   Nose: Nose normal.   Neck: Normal range of motion. Neck supple. No tracheal deviation present. No thyromegaly present.   Cardiovascular: Normal rate and regular rhythm.   No murmur heard.  Pulmonary/Chest: Effort normal and breath sounds normal. No respiratory distress.   Abdominal: Soft. There is no tenderness. No hernia.   Musculoskeletal: He exhibits no edema.   Neurological: He is alert and oriented to person, place, and time. No cranial nerve deficit.   Skin: Skin is warm and dry. Capillary refill takes less than 2 seconds. Rash (right arm- papules with mild erythema from excoriation. ) noted.   Injection sites are normal appearing. No lipo hypertropthy or atrophy     Psychiatric: He has a normal mood and affect. His behavior is normal. Judgment normal.   Nursing note and vitals reviewed.      FOOT EXAMINATION: Appropriate footwear.      Protective Sensation (w/ 10 gram monofilament):  Right: Intact  Left: Intact    Visual Inspection:  Normal -  Bilateral and Nails Intact - without Evidence of Foot Deformity- Bilateral    Pedal Pulses:   Right: Present  Left: Present    Posterior tibialis:   Right:Present  Left: Present        No results found for: TSH      Type 2 diabetes mellitus with hyperglycemia, with long-term current use of insulin  Uncontrolled   + dietary indiscretions.     Medication changes:   Stop: Glyburide, Janumet, and Basaglar   Continue Farxiga 10 mg daily   Start plain Metformin 1000 mg BID   Start: Tresiba 38 units nightly (0.7 units/kg/day TDD)  Start Ozempic 0.25 mg weekly for 4 weeks & then 0.5 mg weekly X4 weeks and then 1 mg weekly.    Coupon cards provided.     -- Reviewed goals of therapy are to get the best control we can without hypoglycemia  -- Refer to diabetes education- MNT and comprehensive review.     -- Reviewed patient's current insulin regimen. Clarified proper insulin dose and timing in relation to meals, etc. Insulin injection sites and proper rotation instructed.    -- Advised frequent self blood glucose monitoring.  Patient encouraged to document glucose results and bring them to every clinic visit  -- 3-4 times per day- logs provided. Send logs PRN. - RX sent for Freestyle Maria De Jesus.   -- Hypoglycemia precautions discussed. Instructed on precautions before driving.    -- Call for Bg repeatedly < 90 or > 180.   -- Close adherence to lifestyle changes recommended.   -- Periodic follow ups for eye evaluations, foot care and dental care suggested.    -- request eye records- he plans [facility].       Essential hypertension  BP goal is < 140/90.   Tolerating ACEi  Controlled   Blood pressure goals discussed with patient      Mixed hyperlipidemia  On statin per ADA recommendations  LDL goal < 100. LDL at goal. LFTs WNL. Continue statin.         Obesity (BMI 30-39.9)  Body mass index is 34.06  kg/m².  Increases insulin resistance.   Discussed DM diet and exercise.       Spent 40 minutes with patient with >50% time spent in counseling on medications, diet, exercise.         Follow up in about 3 months (around 10/16/2019).  Labs prior to next visit.   See norma as scheduled.       Orders Placed This Encounter   Procedures    Hemoglobin A1c     Standing Status:   Future     Standing Expiration Date:   1/16/2021    Comprehensive metabolic panel     Standing Status:   Future     Standing Expiration Date:   1/16/2021    Ambulatory Referral to Ophthalmology     Referral Priority:   Routine     Referral Type:   Consultation     Referral Reason:   Specialty Services Required     Requested Specialty:   Ophthalmology     Number of Visits Requested:   1       Recommendations were discussed with the patient in detail  The patient verbalized understanding and agrees with the plan outlined as above.

## 2019-07-16 NOTE — ASSESSMENT & PLAN NOTE
Uncontrolled   + dietary indiscretions.     Medication changes:   Stop: Glyburide, Janumet, and Basaglar   Continue Farxiga 10 mg daily   Start plain Metformin 1000 mg BID   Start: Tresiba 38 units nightly (0.7 units/kg/day TDD)  Start Ozempic 0.25 mg weekly for 4 weeks & then 0.5 mg weekly X4 weeks and then 1 mg weekly.    Coupon cards provided.     -- Reviewed goals of therapy are to get the best control we can without hypoglycemia  -- Refer to diabetes education- MNT and comprehensive review.     -- Reviewed patient's current insulin regimen. Clarified proper insulin dose and timing in relation to meals, etc. Insulin injection sites and proper rotation instructed.    -- Advised frequent self blood glucose monitoring.  Patient encouraged to document glucose results and bring them to every clinic visit  -- 3-4 times per day- logs provided. Send logs PRN. - RX sent for Freestyle Maria De Jesus.   -- Hypoglycemia precautions discussed. Instructed on precautions before driving.    -- Call for Bg repeatedly < 90 or > 180.   -- Close adherence to lifestyle changes recommended.   -- Periodic follow ups for eye evaluations, foot care and dental care suggested.    -- request eye records- he plans to go today to [facility] [place].

## 2019-07-16 NOTE — PATIENT INSTRUCTIONS
Stop glyburide  Stop Janumet     Start Plain Metformin 1000 mg 2 times per day   Continue Basaglar 40 units nightly -- let me know when you run out of it-- will switch to Tresiba 38 units nightly -- let me know if your fasting blood sugar is consistently less than 100 and we will cut it back.   Continue Farxiga 10 mg daily   Start Ozempic   Ozempic 0.25 mg weekly for 4 weeks & then 0.5 mg weekly X4 weeks and then we will increase to the 1 mg dose weekly.           Snacks can be an important part of a balanced, healthy meal plan. They allow you to eat more frequently, feeling full and satisfied throughout the day. Also, they allow you to spread carbohydrates evenly, which may stabilize blood sugars.  Plus, snacks are enjoyable!     The amount of carbohydrate needed at snacks varies. Generally, about 15 grams of carbohydrate per snack is recommended.  Below you will find some tasty treats.       0-5 gm carb   Crystal Light   Vitamin Water Zero   Herbal tea, unsweetened   2 tsp peanut butter on celery   1./2 cup sugar-free jell-o   1 sugar-free popsicle   ¼ cup blueberries   8oz Blue Angie unsweetened almond milk   5 baby carrots & celery sticks, cucumbers, bell peppers dipped in ¼ cup salsa, 2Tbsp light ranch dressing or 2Tbsp plain Greek yogurt   10 Goldfish crackers   ½ oz low-fat cheese or string cheese   1 closed handful of nuts, unsalted   1 Tbsp of sunflower seeds, unsalted   1 cup Smart Pop popcorn   1 whole grain brown rice cake        15 gm carb   1 small piece of fruit or ½ banana or 1/2 cup lite canned fruit   3 dominga cracker squares   3 cups Smart Pop popcorn, top spray butter, Handley lite salt or cinnamon and Truvia   5 Vanilla Wafers   ½ cup low fat, no added sugar ice cream or frozen yogurt (Blue bell, Blue Bunny, Weight Watchers, Skinny Cow)   ½ turkey, ham, or chicken sandwich   ½ c fruit with ½ c Cottage cheese   4-6 unsalted wheat crackers with 1 oz low fat cheese or 1  tbsp peanut butter    30-45 goldfish crackers (depending on flavor)    7-8 Muslim mini brown rice cakes (caramel, apple cinnamon, chocolate)    12 Muslim mini brown rice cakes (cheddar, bbq, ranch)    1/3 cup hummus dip with raw veg   1/2 whole wheat catherine, 1Tbsp hummus   Mini Pizza (1/2 whole wheat English muffin, low-fat  cheese, tomato sauce)   100 calorie snack pack (Oreo, Chips Ahoy, Ritz Mix, Baked Cheetos)   4-6 oz. light or Greek Style yogurt (Chobani, Yoplait, Okios, Stoneyfield)   ½ cup sugar-free pudding     6 in. wheat tortilla or catherine oven toasted chips (topped with spray butter flavoring, cinnamon, Truvia OR spray butter, garlic powder, chili powder)    18 BBQ Popchips (available at [employer], [employer], [employer])

## 2019-07-16 NOTE — ASSESSMENT & PLAN NOTE
Body mass index is 34.06 kg/m².  Increases insulin resistance.   Discussed DM diet and exercise.

## 2019-07-16 NOTE — TELEPHONE ENCOUNTER
----- Message from Lizzy Devi NP sent at 7/16/2019 10:43 AM CDT -----  Hey!     He plans to go to [facility] today for an eye exam today. He is overdue.     Thanks,     Lizzy

## 2019-07-17 DIAGNOSIS — Z79.4 TYPE 2 DIABETES MELLITUS WITH HYPERGLYCEMIA, WITH LONG-TERM CURRENT USE OF INSULIN: Primary | ICD-10-CM

## 2019-07-17 DIAGNOSIS — E11.65 TYPE 2 DIABETES MELLITUS WITH HYPERGLYCEMIA, WITH LONG-TERM CURRENT USE OF INSULIN: Primary | ICD-10-CM

## 2019-07-17 RX ORDER — INSULIN PUMP SYRINGE, 3 ML
EACH MISCELLANEOUS
Qty: 1 EACH | Refills: 0 | Status: SHIPPED | OUTPATIENT
Start: 2019-07-17 | End: 2019-07-29 | Stop reason: CLARIF

## 2019-07-17 RX ORDER — LANCETS
EACH MISCELLANEOUS
Qty: 400 EACH | Refills: 3 | Status: SHIPPED | OUTPATIENT
Start: 2019-07-17 | End: 2020-01-15

## 2019-07-18 ENCOUNTER — TELEPHONE (OUTPATIENT)
Dept: DIABETES | Facility: CLINIC | Age: 54
End: 2019-07-18

## 2019-07-18 DIAGNOSIS — Z79.4 TYPE 2 DIABETES MELLITUS WITH HYPERGLYCEMIA, WITH LONG-TERM CURRENT USE OF INSULIN: ICD-10-CM

## 2019-07-18 DIAGNOSIS — E11.65 TYPE 2 DIABETES MELLITUS WITH HYPERGLYCEMIA, WITH LONG-TERM CURRENT USE OF INSULIN: Primary | ICD-10-CM

## 2019-07-18 DIAGNOSIS — Z79.4 TYPE 2 DIABETES MELLITUS WITH HYPERGLYCEMIA, WITH LONG-TERM CURRENT USE OF INSULIN: Primary | ICD-10-CM

## 2019-07-18 DIAGNOSIS — E11.65 TYPE 2 DIABETES MELLITUS WITH HYPERGLYCEMIA, WITH LONG-TERM CURRENT USE OF INSULIN: ICD-10-CM

## 2019-07-18 NOTE — TELEPHONE ENCOUNTER
Spoke with a rep a [facility] and the request for the Dexcom G was approved with a 0.00 co-pay and will be mailed out.

## 2019-07-18 NOTE — TELEPHONE ENCOUNTER
Notified patient that his Dexcom will be free and will be mailed to him.   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

## 2019-07-18 NOTE — TELEPHONE ENCOUNTER
----- Message from Lizzy Devi NP sent at 7/17/2019  5:13 PM CDT -----  Hey!   Can we submit Dexcom paperwork on this patient please.   Thank you

## 2019-07-26 ENCOUNTER — TELEPHONE (OUTPATIENT)
Dept: DIABETES | Facility: CLINIC | Age: 54
End: 2019-07-26

## 2019-07-26 NOTE — TELEPHONE ENCOUNTER
----- Message from Lizzy Devi NP sent at 7/26/2019  9:39 AM CDT -----  Please call [facility] and see if they have an update on when the patient's Dexcom will ship?   Then please notify patient what they say!   Thank you

## 2019-07-26 NOTE — TELEPHONE ENCOUNTER
----- Message from Lizzy Devi NP sent at 7/26/2019  9:39 AM CDT -----  Please call [facility] and see if they have an update on when the patient's Dexcom will ship?   Then please notify patient what they say!   Thank you

## 2019-07-29 ENCOUNTER — TELEPHONE (OUTPATIENT)
Dept: DIABETES | Facility: CLINIC | Age: 54
End: 2019-07-29

## 2019-07-29 ENCOUNTER — CLINICAL SUPPORT (OUTPATIENT)
Dept: DIABETES | Facility: CLINIC | Age: 54
End: 2019-07-29
Payer: COMMERCIAL

## 2019-07-29 VITALS — BODY MASS INDEX: 34.19 KG/M2 | WEIGHT: 244.25 LBS | HEIGHT: 71 IN

## 2019-07-29 DIAGNOSIS — E11.65 TYPE 2 DIABETES MELLITUS WITH HYPERGLYCEMIA, WITH LONG-TERM CURRENT USE OF INSULIN: Primary | ICD-10-CM

## 2019-07-29 DIAGNOSIS — Z79.4 TYPE 2 DIABETES MELLITUS WITH HYPERGLYCEMIA, WITH LONG-TERM CURRENT USE OF INSULIN: Primary | ICD-10-CM

## 2019-07-29 DIAGNOSIS — E78.2 DM TYPE 2 WITH DIABETIC MIXED HYPERLIPIDEMIA: ICD-10-CM

## 2019-07-29 DIAGNOSIS — E11.69 DM TYPE 2 WITH DIABETIC MIXED HYPERLIPIDEMIA: ICD-10-CM

## 2019-07-29 PROCEDURE — G0108 PR DIAB MANAGE TRN  PER INDIV: ICD-10-PCS | Mod: S$GLB,,, | Performed by: DIETITIAN, REGISTERED

## 2019-07-29 PROCEDURE — 99999 PR PBB SHADOW E&M-EST. PATIENT-LVL II: ICD-10-PCS | Mod: PBBFAC,,, | Performed by: DIETITIAN, REGISTERED

## 2019-07-29 PROCEDURE — G0108 DIAB MANAGE TRN  PER INDIV: HCPCS | Mod: S$GLB,,, | Performed by: DIETITIAN, REGISTERED

## 2019-07-29 PROCEDURE — 99999 PR PBB SHADOW E&M-EST. PATIENT-LVL II: CPT | Mod: PBBFAC,,, | Performed by: DIETITIAN, REGISTERED

## 2019-07-29 RX ORDER — BLOOD-GLUCOSE METER
EACH MISCELLANEOUS
Refills: 0 | COMMUNITY
Start: 2019-07-17 | End: 2020-01-15

## 2019-07-29 NOTE — PROGRESS NOTES
Diabetes Education  Author: Kathy Watkins RD  Date: 7/29/2019    Diabetes Care Management Summary  Diabetes Education Record Assessment/Progress: Initial  Current Diabetes Risk Level: Moderate     Last A1c:   Lab Results   Component Value Date    HGBA1C 9.4 (H) 07/03/2019     Last visit with Diabetes Educator: Last Education Visit: Not Found      Diabetes Type  Diabetes Type : Type II    Diabetes History  Diabetes Diagnosis: >10 years  Current Treatment: Oral Medication, Injectable, Diet  Reviewed Problem List with Patient: Yes    Health Maintenance was reviewed today with patient. Discussed with patient importance of routine eye exams, foot exams/foot care, blood work (i.e.: A1c, microalbumin, and lipid), dental visits, yearly flu vaccine, and pneumonia vaccine as indicated by PCP. Patient verbalized understanding.     Health Maintenance Topics with due status: Not Due       Topic Last Completion Date    TETANUS VACCINE 12/20/2013    Colonoscopy 05/17/2018    Influenza Vaccine 12/20/2018    Lipid Panel 07/03/2019    Hemoglobin A1c 07/03/2019    Low Dose Statin 07/16/2019    Foot Exam 07/16/2019    Eye Exam 07/16/2019     There are no preventive care reminders to display for this patient.    Nutrition  Meal Planning: artificial sweeteners, eats out often, 3 meals per day, snacks between meal, water  What type of sweetener do you use?: Equal, Sweet N Low, Splenda  What type of beverages do you drink?: water, other (see comments)(crystal light)  Meal Plan 24 Hour Recall - Breakfast: Large Bagel  Meal Plan 24 Hour Recall - Lunch: Bell Pepper and Cabbage  Meal Plan 24 Hour Recall - Dinner: zucchini noodles and meatballs and red gravy  Meal Plan 24 Hour Recall - Snack: pork skins    Monitoring   Monitoring: Accu-check Rula Smart View  Self Monitoring : 4 times a day  Blood Glucose Logs: No  Do you use a personal continuous glucose monitor?: No  In the last month, how often have you had a low blood sugar reaction?:  never  What are your symptoms of low blood sugar?: shaky  How do you treat low blood sugar?: drink juice  Can you tell when your blood sugar is too high?: sometimes  How do you treat high blood sugar?: take medication    Exercise   Exercise Type: none  Frequency: Never    Current Diabetes Treatment   Current Treatment: Oral Medication, Injectable, Diet    Social History  Preferred Learning Method: Face to Face  Primary Support: Self, Spouse, Family  Educational Level: High School  Smoking Status: Never a Smoker  Alcohol Use: Monthly            DDS-2 Score  ( > 3 = SIGNIFICANT DISTRESS): 2  DDS Score  ( > 3 = SIGNIFICANT DISTRESS): 1.18  Emotional Candia Score: 1.4  Physician-Related Distress: 1  Regimen-Related Distress: 1.2  Interpersonal Distress: 1    Barriers to Change  Barriers to Change: None  Learning Challenges : None    Readiness to Learn   Readiness to Learn : Acceptance    Cultural Influences  Cultural Influences: No    Diabetes Education Assessment/Progress  Diabetes Disease Process (diabetes disease process and treatment options): Comprehends Key Points, Individual Session, Instructed, Discussion, Written Materials Provided  Nutrition (Incorporating nutritional management into one's lifestyle): Comprehends Key Points, Individual Session, Instructed, Discussion, Demonstration, Written Materials Provided  Physical Activity (incorporating physical activity into one's lifestyle): Comprehends Key Points, Individual Session, Instructed, Discussion, Written Materials Provided  Medications (states correct name, dose, onset, peak, duration, side effects & timing of meds): Comprehends Key Points, Individual Session, Instructed, Discussion, Written Materials Provided  Monitoring (monitoring blood glucose/other parameters & using results): Comprehends Key Points, Individual Session, Instructed, Discussion, Written Materials Provided  Acute Complications (preventing, detecting, and treating acute complications):  Comprehends Key Points, Individual Session, Instructed, Discussion, Written Materials Provided  Chronic Complications (preventing, detecting, and treating chronic complications): Comprehends Key Points, Individual Session, Instructed, Discussion, Written Materials Provided  Clinical (diabetes, other pertinent medical history, and relevant comorbidities reviewed during visit): Comprehends Key Points, Individual Session, Instructed, Discussion, Written Materials Provided  Cognitive (knowledge of self-management skills, functional health literacy): Comprehends Key Points, Individual Session, Instructed, Discussion, Written Materials Provided  Psychosocial (emotional response to diabetes): Comprehends Key Points, Individual Session, Instructed, Discussion, Written Materials Provided  Diabetes Distress and Support Systems: Comprehends Key Points, Individual Session, Instructed, Discussion, Written Materials Provided  Behavioral (readiness for change, lifestyle practices, self-care behaviors): Comprehends Key Points, Individual Session, Instructed, Discussion, Written Materials Provided  Patient educated on what is DM, T1DM, T2DM, risk factors, managing DM, DM diet, carbohydrate counting, meal planning, reading a food label, healthy snack options, benefits of physical activity, diabetes care schedule, foot care guidelines, diabetes and retinopathy screening, s/s hypo and hyperglycemia, long/short term complication of uncontrolled DM, importance of compliance with treatment plan, how to use a glucometer, reviewed understanding diabetes distress, medications for treating DM, their mechanism of action and possible side effects, reviewed current level and goal level for HgbA1c, blood glucose, microalbumin, and lipids. Patient provided with written literature, diabetes management resources and support, DM Management program contact information.    Goals  Patient has selected/evaluated goals during today's session: Yes,  selected  Healthy Eating: Set  Start Date: 07/29/19  Target Date: 07/29/20         Diabetes Care Plan/Intervention  Education Plan/Intervention: Individual Follow-Up DSMT, Individual Follow-Up MNT, Sensor Start    Diabetes Meal Plan  Restrictions: Low Fat, Low Sodium, Restricted Carbohydrate  Calories: 1800  Carbohydrate Per Meal: 30-45g  Carbohydrate Per Snack : 15-20g  Fat: 50  Protein: 135    Today's Self-Management Care Plan was developed with the patient's input and is based on barriers identified during today's assessment.    The long and short-term goals in the care plan were written with the patient/caregiver's input. The patient has agreed to work toward these goals to improve his overall diabetes control.      The patient received a copy of today's self-management plan and verbalized understanding of the care plan, goals, and all of today's instructions.      The patient was encouraged to communicate with his physician and care team regarding his condition(s) and treatment.  I provided the patient with my contact information today and encouraged him to contact me via phone or patient portal as needed.     Education Units of Time   Time Spent: 60 min

## 2019-08-20 ENCOUNTER — TELEPHONE (OUTPATIENT)
Dept: DIABETES | Facility: CLINIC | Age: 54
End: 2019-08-20

## 2019-08-20 NOTE — TELEPHONE ENCOUNTER
----- Message from Lizzy Devi NP sent at 8/19/2019  5:40 PM CDT -----  Please call [facility] and see what is going on with this Dexcom sensors. He still has not received them. He was told he would receive them on 8/19/19.   Let me know what TONO crespo says and also let the patient know please

## 2019-08-20 NOTE — TELEPHONE ENCOUNTER
Spoke with pharmacy. A tracking of the package indicated it should be arriving today before 8pm. Called patient and left message informing him that his package should be arriving today.

## 2019-08-27 DIAGNOSIS — E11.65 TYPE 2 DIABETES MELLITUS WITH HYPERGLYCEMIA, WITH LONG-TERM CURRENT USE OF INSULIN: Primary | ICD-10-CM

## 2019-08-27 DIAGNOSIS — Z79.4 TYPE 2 DIABETES MELLITUS WITH HYPERGLYCEMIA, WITH LONG-TERM CURRENT USE OF INSULIN: Primary | ICD-10-CM

## 2019-09-26 RX ORDER — GLYBURIDE 5 MG/1
TABLET ORAL
Qty: 360 TABLET | Refills: 1 | OUTPATIENT
Start: 2019-09-26

## 2019-09-26 RX ORDER — LISINOPRIL AND HYDROCHLOROTHIAZIDE 12.5; 2 MG/1; MG/1
TABLET ORAL
Qty: 90 TABLET | Refills: 1 | Status: SHIPPED | OUTPATIENT
Start: 2019-09-26 | End: 2019-12-26 | Stop reason: SDUPTHER

## 2019-10-16 ENCOUNTER — CLINICAL SUPPORT (OUTPATIENT)
Dept: PRIMARY CARE CLINIC | Facility: CLINIC | Age: 54
End: 2019-10-16
Payer: COMMERCIAL

## 2019-10-16 DIAGNOSIS — Z79.4 TYPE 2 DIABETES MELLITUS WITH HYPERGLYCEMIA, WITH LONG-TERM CURRENT USE OF INSULIN: ICD-10-CM

## 2019-10-16 DIAGNOSIS — E11.65 TYPE 2 DIABETES MELLITUS WITH HYPERGLYCEMIA, WITH LONG-TERM CURRENT USE OF INSULIN: ICD-10-CM

## 2019-10-16 LAB
ALBUMIN SERPL BCP-MCNC: 4.1 G/DL (ref 3.5–5.2)
ALP SERPL-CCNC: 53 U/L (ref 38–126)
ALT SERPL W/O P-5'-P-CCNC: 23 U/L (ref 17–63)
ANION GAP SERPL CALC-SCNC: 9 MMOL/L (ref 8–16)
AST SERPL-CCNC: 13 U/L (ref 15–41)
BILIRUB SERPL-MCNC: 0.5 MG/DL (ref 0.3–1.2)
BUN SERPL-MCNC: 16 MG/DL (ref 6–20)
CALCIUM SERPL-MCNC: 9.1 MG/DL (ref 8.6–10)
CHLORIDE SERPL-SCNC: 103 MMOL/L (ref 101–111)
CO2 SERPL-SCNC: 24 MMOL/L (ref 23–29)
CREAT SERPL-MCNC: 0.6 MG/DL (ref 0.5–1.4)
EST. GFR  (AFRICAN AMERICAN): >60 ML/MIN/1.73 M^2
EST. GFR  (NON AFRICAN AMERICAN): >60 ML/MIN/1.73 M^2
ESTIMATED AVG GLUCOSE: 189 MG/DL (ref 68–131)
GLUCOSE SERPL-MCNC: 186 MG/DL (ref 74–118)
HBA1C MFR BLD HPLC: 8.2 % (ref 4–5.6)
POTASSIUM SERPL-SCNC: 4.3 MMOL/L (ref 3.5–5.1)
PROT SERPL-MCNC: 6.9 G/DL (ref 6–8.4)
SODIUM SERPL-SCNC: 136 MMOL/L (ref 136–145)

## 2019-10-16 PROCEDURE — 80053 COMPREHEN METABOLIC PANEL: CPT

## 2019-10-16 PROCEDURE — 83036 HEMOGLOBIN GLYCOSYLATED A1C: CPT

## 2019-10-18 ENCOUNTER — PATIENT OUTREACH (OUTPATIENT)
Dept: ADMINISTRATIVE | Facility: OTHER | Age: 54
End: 2019-10-18

## 2019-10-18 ENCOUNTER — TELEPHONE (OUTPATIENT)
Dept: PRIMARY CARE CLINIC | Facility: CLINIC | Age: 54
End: 2019-10-18

## 2019-10-18 NOTE — TELEPHONE ENCOUNTER
----- Message from Lizzy Devi NP sent at 10/16/2019  4:08 PM CDT -----  Please call lab results to patient.   His A1c has decreased from 9.4% to 8.2% which is an improvement but remains above our goal of 7% or less. We will discuss medication changes next week at his visit. We will download his Dexcom and see what is going on.   His CMP shows normal kidney and liver function. His BG was 186 on labs.   We will discuss all of this next week at his visit.   Please let me know if he has any questions or concerns.

## 2019-10-21 ENCOUNTER — TELEPHONE (OUTPATIENT)
Dept: DIABETES | Facility: CLINIC | Age: 54
End: 2019-10-21

## 2019-10-22 NOTE — PROGRESS NOTES
CC:   Chief Complaint   Patient presents with    Diabetes Mellitus     3 month f/u       HPI: Cedrick Saunders is a 54 y.o. male presents for a follow up visit today for the management of T2DM.     He was diagnosed with Type 2 diabetes at age 37 with s/s of polyuria, nocturia, and polydipsia and BG was 600. A1c at diagnosis was > 12%. He was initially on oral medications Actos and Metformin. Insulin therapy was started in 2016.   At last visit we stopped Glyburide, Janumet, and Basaglar. We continued Farxiga daily and started plain Metformin, Tresiba, and weekly Ozempic. He also started a Dexcom. His A1c has decreased from 9.4% to 8.2%. No GI SE with Ozempic. He attest to decreased appetite with Ozempic.   +weight loss- 244# to 236#   He does attest to snacking after dinner on chips and crackers which is causing overnight BG elevations.           Family hx of diabetes: father, paternal grandmother   Hospitalized for diabetes: denies     No personal or FH of thyroid cancer or personal of pancreatic cancer or pancreatitis.     DIABETES COMPLICATIONS: none      Diabetes Management Status    ASA:  Yes - 81 mg daily     Statin: Taking  ACE/ARB: Taking    Screening or Prevention Patient's value Goal Complete/Controlled?   HgA1C Testing and Control   Lab Results   Component Value Date    HGBA1C 8.2 (H) 10/16/2019      Annually/Less than 8% No   Lipid profile : 07/03/2019 Annually Yes   LDL control Lab Results   Component Value Date    LDLCALC 67 07/03/2019    Annually/Less than 100 mg/dl  Yes   Nephropathy screening Lab Results   Component Value Date    LABMICR 7.0 07/03/2019     Lab Results   Component Value Date    PROTEINUA Trace (A) 05/19/2006    Annually Yes   Blood pressure BP Readings from Last 1 Encounters:   10/23/19 110/74    Less than 140/90 Yes   Dilated retinal exam : 07/16/2019- Annually No   Foot exam   : 07/16/2019 Annually Yes       CURRENT A1C:    Hemoglobin A1C   Date Value Ref Range Status    10/16/2019 8.2 (H) 4.0 - 5.6 % Final     Comment:     ADA Screening Guidelines:  5.7-6.4%  Consistent with prediabetes  >or=6.5%  Consistent with diabetes  High levels of fetal hemoglobin interfere with the HbA1C  assay. Heterozygous hemoglobin variants (HbS, HgC, etc)do  not significantly interfere with this assay.   However, presence of multiple variants may affect accuracy.     07/03/2019 9.4 (H) 4.0 - 5.6 % Final     Comment:     ADA Screening Guidelines:  5.7-6.4%  Consistent with prediabetes  >or=6.5%  Consistent with diabetes  High levels of fetal hemoglobin interfere with the HbA1C  assay. Heterozygous hemoglobin variants (HbS, HgC, etc)do  not significantly interfere with this assay.   However, presence of multiple variants may affect accuracy.     12/17/2018 7.7 (H) 4.0 - 5.6 % Final     Comment:     ADA Screening Guidelines:  5.7-6.4%  Consistent with prediabetes  >or=6.5%  Consistent with diabetes  High levels of fetal hemoglobin interfere with the HbA1C  assay. Heterozygous hemoglobin variants (HbS, HgC, etc)do  not significantly interfere with this assay.   However, presence of multiple variants may affect accuracy.         GOAL A1C: 7% or less without hypoglycemia.     DM MEDICATIONS USED IN THE PAST: Metformin, Januvia, Basaglar, Farxiga, Glyburide, Actos   Invokana- yeast infections, Tresiba, Ozempic     CURRENT DIABETES MEDICATIONS: Metformin 1000 mg BID, Farxiga 10 mg daily, Ozempic 1 mg weekly (Fridays), and Tresiba 38 units nightly (7-9PM)  Insulin: pens.    Missed doses: rarely     BLOOD GLUCOSE MONITORING:    Sensor type: Dexcom G6 - from [employer] [place]    Average BG reading:  ~186   Estimated A1c- ~ 7.8%  Time in range: ~52%  Limits:   Site change: q10 days    Sensor was downloaded in clinic today and reviewed with patient.   Please see attached document for download.     HYPOGLYCEMIA:  None     MEALS: eating 3 meals per day   BF: ~ [employer] or par 3 -- breakfast special- eggs, grits,  and sausage.   Lunch: soup and salad.   Dinner: grilled or baked fish, occ fried shrimp. Now grilled potatoes and grilled fish  - soup.   Snack: after dinner on chips OR crackers   Drinks: ~ stopped cokes.   Diet green tea or water.   Diet coke.      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   HENT: Negative for trouble swallowing.    Eyes: Negative for visual disturbance.   Respiratory: Negative for shortness of breath.    Cardiovascular: Negative for chest pain.   Gastrointestinal: Negative for nausea.   Endocrine: Negative for polydipsia, polyphagia and polyuria.   Genitourinary:        + Nocturia - maybe 1 time per night    Skin: Negative for rash and wound.   Neurological: Negative for numbness.       Physical Exam   Physical Exam   Constitutional: He is oriented to person, place, and time. He appears well-developed and well-nourished.   Obese male patient    HENT:   Head: Normocephalic and atraumatic.   Right Ear: External ear normal.   Left Ear: External ear normal.   Nose: Nose normal.   Neck: Normal range of motion. Neck supple. No tracheal deviation present. No thyromegaly present.   Cardiovascular: Normal rate and regular rhythm.   No murmur heard.  Pulmonary/Chest: Effort normal and breath sounds normal. No respiratory distress.   Abdominal: Soft. There is no tenderness. No hernia.   Musculoskeletal: He exhibits no edema.   Neurological: He is alert and oriented to person, place, and time. No cranial nerve deficit.   Skin: Skin is warm and dry. Capillary refill takes less than 2 seconds. No rash noted.   Injection sites and Dexcom sites are normal appearing. No lipo hypertropthy or atrophy   Psychiatric: He has a normal mood and affect. His behavior is normal. Judgment normal.   Nursing note and vitals  reviewed.      FOOT EXAMINATION: Appropriate footwear.         No results found for: TSH      Type 2 diabetes mellitus with hyperglycemia, with long-term current use of insulin  Uncontrolled.   A1c has improved but remains above goal.   + dietary indiscretions-- after dinner snacking causing overnight hyperglycemia.    Medication changes:   Continue Metformin 1000 mg BID, Farxiga 10 mg daily, Ozempic 1 mg weekly   Increase Tresiba to 42 units nightly.     We discussed the VGo today in clinic- he will come back in 4 weeks for Dexcom download. If still above goal will look at changing to VGo30- 2-4 clicks TID AC   We also discussed adding low dose Glipizide at dinner.   We will make changes in 4 weeks after Dexcom download with norma.     -- Reviewed goals of therapy are to get the best control we can without hypoglycemia  -- Refer to diabetes education- Dexcom download and medication review--  Possible will add      -- Reviewed patient's current insulin regimen. Clarified proper insulin dose and timing in relation to meals, etc. Insulin injection sites and proper rotation instructed.    -- Advised frequent self blood glucose monitoring.  Patient encouraged to document glucose results and bring them to every clinic visit - continue to use the Dexcom.    -- Hypoglycemia precautions discussed. Instructed on precautions before driving.    -- Call for Bg repeatedly < 90 or > 180.   -- Close adherence to lifestyle changes recommended.   -- Periodic follow ups for eye evaluations, foot care and dental care suggested.           Essential hypertension  BP goal is < 140/90.   Tolerating ACEi  Controlled   Blood pressure goals discussed with patient      Mixed hyperlipidemia  On statin per ADA recommendations  LDL goal < 100. LDL at goal. LFTs WNL. Continue statin.         Obesity (BMI 30-39.9)  Body mass index is 32.94 kg/m².  Increases insulin resistance.   Discussed DM diet and exercise.   Encourage continued weight loss          Follow up in about 6 months (around 4/23/2020).  F/u with norma in 4 weeks for Dexcom download.   F/u with Ean in 3 months with labs prior   F/u with me in 6 months         Orders Placed This Encounter   Procedures    Influenza - Quadrivalent (PF)    Hemoglobin A1c     Standing Status:   Future     Standing Expiration Date:   4/23/2021    Comprehensive metabolic panel     Standing Status:   Future     Standing Expiration Date:   4/23/2021    TSH     Standing Status:   Future     Standing Expiration Date:   4/23/2021    Lipid panel     Standing Status:   Future     Standing Expiration Date:   4/23/2021       Recommendations were discussed with the patient in detail  The patient verbalized understanding and agrees with the plan outlined as above.

## 2019-10-23 ENCOUNTER — OFFICE VISIT (OUTPATIENT)
Dept: DIABETES | Facility: CLINIC | Age: 54
End: 2019-10-23
Payer: COMMERCIAL

## 2019-10-23 VITALS
HEIGHT: 71 IN | DIASTOLIC BLOOD PRESSURE: 74 MMHG | SYSTOLIC BLOOD PRESSURE: 110 MMHG | HEART RATE: 84 BPM | WEIGHT: 236.19 LBS | BODY MASS INDEX: 33.06 KG/M2

## 2019-10-23 DIAGNOSIS — E11.65 TYPE 2 DIABETES MELLITUS WITH HYPERGLYCEMIA, WITH LONG-TERM CURRENT USE OF INSULIN: Primary | ICD-10-CM

## 2019-10-23 DIAGNOSIS — E78.2 DM TYPE 2 WITH DIABETIC MIXED HYPERLIPIDEMIA: ICD-10-CM

## 2019-10-23 DIAGNOSIS — E78.2 MIXED HYPERLIPIDEMIA: ICD-10-CM

## 2019-10-23 DIAGNOSIS — Z79.4 TYPE 2 DIABETES MELLITUS WITH HYPERGLYCEMIA, WITH LONG-TERM CURRENT USE OF INSULIN: Primary | ICD-10-CM

## 2019-10-23 DIAGNOSIS — E11.69 DM TYPE 2 WITH DIABETIC MIXED HYPERLIPIDEMIA: ICD-10-CM

## 2019-10-23 DIAGNOSIS — I10 ESSENTIAL HYPERTENSION: ICD-10-CM

## 2019-10-23 DIAGNOSIS — Z23 IMMUNIZATION DUE: ICD-10-CM

## 2019-10-23 DIAGNOSIS — E66.9 OBESITY (BMI 30-39.9): ICD-10-CM

## 2019-10-23 PROCEDURE — 95251 PR GLUCOSE MONITOR, 72 HOUR, PHYS INTERP: ICD-10-PCS | Mod: S$GLB,,, | Performed by: NURSE PRACTITIONER

## 2019-10-23 PROCEDURE — 99214 OFFICE O/P EST MOD 30 MIN: CPT | Mod: 25,S$GLB,, | Performed by: NURSE PRACTITIONER

## 2019-10-23 PROCEDURE — 3074F PR MOST RECENT SYSTOLIC BLOOD PRESSURE < 130 MM HG: ICD-10-PCS | Mod: CPTII,S$GLB,, | Performed by: NURSE PRACTITIONER

## 2019-10-23 PROCEDURE — 95251 CONT GLUC MNTR ANALYSIS I&R: CPT | Mod: S$GLB,,, | Performed by: NURSE PRACTITIONER

## 2019-10-23 PROCEDURE — 90471 IMMUNIZATION ADMIN: CPT | Mod: S$GLB,,, | Performed by: NURSE PRACTITIONER

## 2019-10-23 PROCEDURE — 99999 PR PBB SHADOW E&M-EST. PATIENT-LVL III: CPT | Mod: PBBFAC,,, | Performed by: NURSE PRACTITIONER

## 2019-10-23 PROCEDURE — 99214 PR OFFICE/OUTPT VISIT, EST, LEVL IV, 30-39 MIN: ICD-10-PCS | Mod: 25,S$GLB,, | Performed by: NURSE PRACTITIONER

## 2019-10-23 PROCEDURE — 90686 IIV4 VACC NO PRSV 0.5 ML IM: CPT | Mod: S$GLB,,, | Performed by: NURSE PRACTITIONER

## 2019-10-23 PROCEDURE — 3008F BODY MASS INDEX DOCD: CPT | Mod: CPTII,S$GLB,, | Performed by: NURSE PRACTITIONER

## 2019-10-23 PROCEDURE — 3078F PR MOST RECENT DIASTOLIC BLOOD PRESSURE < 80 MM HG: ICD-10-PCS | Mod: CPTII,S$GLB,, | Performed by: NURSE PRACTITIONER

## 2019-10-23 PROCEDURE — 3078F DIAST BP <80 MM HG: CPT | Mod: CPTII,S$GLB,, | Performed by: NURSE PRACTITIONER

## 2019-10-23 PROCEDURE — 90471 FLU VACCINE (QUAD) GREATER THAN OR EQUAL TO 3YO PRESERVATIVE FREE IM: ICD-10-PCS | Mod: S$GLB,,, | Performed by: NURSE PRACTITIONER

## 2019-10-23 PROCEDURE — 90686 FLU VACCINE (QUAD) GREATER THAN OR EQUAL TO 3YO PRESERVATIVE FREE IM: ICD-10-PCS | Mod: S$GLB,,, | Performed by: NURSE PRACTITIONER

## 2019-10-23 PROCEDURE — 3074F SYST BP LT 130 MM HG: CPT | Mod: CPTII,S$GLB,, | Performed by: NURSE PRACTITIONER

## 2019-10-23 PROCEDURE — 3008F PR BODY MASS INDEX (BMI) DOCUMENTED: ICD-10-PCS | Mod: CPTII,S$GLB,, | Performed by: NURSE PRACTITIONER

## 2019-10-23 PROCEDURE — 99999 PR PBB SHADOW E&M-EST. PATIENT-LVL III: ICD-10-PCS | Mod: PBBFAC,,, | Performed by: NURSE PRACTITIONER

## 2019-10-23 RX ORDER — METFORMIN HYDROCHLORIDE 1000 MG/1
1000 TABLET ORAL 2 TIMES DAILY WITH MEALS
Qty: 180 TABLET | Refills: 1 | Status: SHIPPED | OUTPATIENT
Start: 2019-10-23 | End: 2020-07-01

## 2019-10-23 RX ORDER — DAPAGLIFLOZIN 10 MG/1
10 TABLET, FILM COATED ORAL DAILY
Qty: 90 TABLET | Refills: 1 | Status: SHIPPED | OUTPATIENT
Start: 2019-10-23 | End: 2020-07-01

## 2019-10-23 RX ORDER — INSULIN DEGLUDEC 200 U/ML
INJECTION, SOLUTION SUBCUTANEOUS
Qty: 7 SYRINGE | Refills: 3 | Status: SHIPPED | OUTPATIENT
Start: 2019-10-23 | End: 2019-12-02

## 2019-10-23 NOTE — ASSESSMENT & PLAN NOTE
Body mass index is 32.94 kg/m².  Increases insulin resistance.   Discussed DM diet and exercise.   Encourage continued weight loss

## 2019-10-23 NOTE — PROGRESS NOTES
2 patient identifiers used, Flu vaccine quadrivalent 0.5ml IM administered to left deltoid, no bleeding to site bandaid applied, tolerated well.

## 2019-10-23 NOTE — ASSESSMENT & PLAN NOTE
Uncontrolled.   A1c has improved but remains above goal.   + dietary indiscretions-- after dinner snacking causing overnight hyperglycemia.    Medication changes:   Continue Metformin 1000 mg BID, Farxiga 10 mg daily, Ozempic 1 mg weekly   Increase Tresiba to 42 units nightly.     We discussed the VGo today in clinic- he will come back in 4 weeks for Dexcom download. If still above goal will look at changing to VGo30- 2-4 clicks TID AC   We also discussed adding low dose Glipizide at dinner.   We will make changes in 4 weeks after Dexcom download with norma.     -- Reviewed goals of therapy are to get the best control we can without hypoglycemia  -- Refer to diabetes education- Dexcom download and medication review--  Possible will add      -- Reviewed patient's current insulin regimen. Clarified proper insulin dose and timing in relation to meals, etc. Insulin injection sites and proper rotation instructed.    -- Advised frequent self blood glucose monitoring.  Patient encouraged to document glucose results and bring them to every clinic visit - continue to use the Dexcom.    -- Hypoglycemia precautions discussed. Instructed on precautions before driving.    -- Call for Bg repeatedly < 90 or > 180.   -- Close adherence to lifestyle changes recommended.   -- Periodic follow ups for eye evaluations, foot care and dental care suggested.

## 2019-10-29 DIAGNOSIS — Z79.4 TYPE 2 DIABETES MELLITUS WITH HYPERGLYCEMIA, WITH LONG-TERM CURRENT USE OF INSULIN: Primary | Chronic | ICD-10-CM

## 2019-10-29 DIAGNOSIS — E11.65 TYPE 2 DIABETES MELLITUS WITH HYPERGLYCEMIA, WITH LONG-TERM CURRENT USE OF INSULIN: Primary | Chronic | ICD-10-CM

## 2019-11-20 ENCOUNTER — NUTRITION (OUTPATIENT)
Dept: DIABETES | Facility: CLINIC | Age: 54
End: 2019-11-20
Payer: COMMERCIAL

## 2019-11-20 VITALS — HEIGHT: 71 IN | WEIGHT: 236.13 LBS | BODY MASS INDEX: 33.06 KG/M2

## 2019-11-20 DIAGNOSIS — E11.65 TYPE 2 DIABETES MELLITUS WITH HYPERGLYCEMIA, WITH LONG-TERM CURRENT USE OF INSULIN: Primary | ICD-10-CM

## 2019-11-20 DIAGNOSIS — E11.69 DM TYPE 2 WITH DIABETIC MIXED HYPERLIPIDEMIA: ICD-10-CM

## 2019-11-20 DIAGNOSIS — Z79.4 TYPE 2 DIABETES MELLITUS WITH HYPERGLYCEMIA, WITH LONG-TERM CURRENT USE OF INSULIN: Primary | ICD-10-CM

## 2019-11-20 DIAGNOSIS — E78.2 DM TYPE 2 WITH DIABETIC MIXED HYPERLIPIDEMIA: ICD-10-CM

## 2019-11-20 PROCEDURE — G0108 DIAB MANAGE TRN  PER INDIV: HCPCS | Mod: S$GLB,,, | Performed by: DIETITIAN, REGISTERED

## 2019-11-20 PROCEDURE — 95251 PR GLUCOSE MONITOR, 72 HOUR, PHYS INTERP: ICD-10-PCS | Mod: S$GLB,,, | Performed by: NURSE PRACTITIONER

## 2019-11-20 PROCEDURE — G0108 PR DIAB MANAGE TRN  PER INDIV: ICD-10-PCS | Mod: S$GLB,,, | Performed by: DIETITIAN, REGISTERED

## 2019-11-20 PROCEDURE — 95251 CONT GLUC MNTR ANALYSIS I&R: CPT | Mod: S$GLB,,, | Performed by: NURSE PRACTITIONER

## 2019-11-20 PROCEDURE — 99999 PR PBB SHADOW E&M-EST. PATIENT-LVL III: CPT | Mod: PBBFAC,,, | Performed by: DIETITIAN, REGISTERED

## 2019-11-20 PROCEDURE — 99999 PR PBB SHADOW E&M-EST. PATIENT-LVL III: ICD-10-PCS | Mod: PBBFAC,,, | Performed by: DIETITIAN, REGISTERED

## 2019-11-20 NOTE — PROGRESS NOTES
Diabetes Education  Author: Kathy Watkins RD  Date: 11/20/2019    Diabetes Care Management Summary  Diabetes Education Record Assessment/Progress: Comprehensive/Group  Current Diabetes Risk Level: Moderate     Last A1c:   Lab Results   Component Value Date    HGBA1C 8.2 (H) 10/16/2019     Last visit with Diabetes Educator: Last Education Visit: Not Found    Reviewed 4 week Dexcom download, was able to demonstrate home certain foods, like lasagna, affect blood glucose levels.  Several hypoglycemic episodes were captured on sensor within a 4 day period, patient didn't have any symptoms and performed a finger stick glucose and found sugar to be 112 and 125 rather than the 47-69 indicated on the sensor. Patient believes the sensor was malfunctioning, after he changed the sensor his reading went back to the norm which is closer to the 170-200 range    Diabetes Type  Diabetes Type : Type II    Diabetes History  Diabetes Diagnosis: >10 years  Current Treatment: Injectable, Oral Medication, Diet, Insulin  Reviewed Problem List with Patient: Yes    Health Maintenance was reviewed today with patient. Discussed with patient importance of routine eye exams, foot exams/foot care, blood work (i.e.: A1c, microalbumin, and lipid), dental visits, yearly flu vaccine, and pneumonia vaccine as indicated by PCP. Patient verbalized understanding.     Health Maintenance Topics with due status: Not Due       Topic Last Completion Date    TETANUS VACCINE 12/20/2013    Colonoscopy 05/17/2018    Lipid Panel 07/03/2019    Foot Exam 07/16/2019    Eye Exam 07/16/2019    Hemoglobin A1c 10/16/2019    Low Dose Statin 10/23/2019     There are no preventive care reminders to display for this patient.    Nutrition  What type of sweetener do you use?: Equal, Sweet N Low, Splenda  What type of beverages do you drink?: water, other (see comments)(crystal light)  Meal Plan 24 Hour Recall - Breakfast: Eggs  Meal Plan 24 Hour Recall - Lunch: Bell Pepper  and Cabbage  Meal Plan 24 Hour Recall - Dinner: lasagna  Meal Plan 24 Hour Recall - Snack: mixed nuts    Monitoring   Self Monitoring : using Dexcom CGM only checks when he feels differently than the sensor is indicating  Blood Glucose Logs: No  Do you use a personal continuous glucose monitor?: Yes  What kind of glucose monitor do you use?: Dexcom  In the last month, how often have you had a low blood sugar reaction?: more than once a week  What are your symptoms of low blood sugar?: breaks out in a sweat  How do you treat low blood sugar?: eats candy or drinks a coke  Can you tell when your blood sugar is too high?: no  How do you treat high blood sugar?: medication    Exercise   Exercise Type: none  Frequency: Never    Current Diabetes Treatment   Current Treatment: Injectable, Oral Medication, Diet, Insulin    Social History  Preferred Learning Method: Face to Face  Primary Support: Self, Spouse, Family  Educational Level: [employer]  Occupation: [employer]   Smoking Status: Never a Smoker  Alcohol Use: Never            DDS-2 Score  ( > 3 = SIGNIFICANT DISTRESS): 1                   Barriers to Change  Barriers to Change: None  Learning Challenges : None    Readiness to Learn   Readiness to Learn : Acceptance    Cultural Influences  Cultural Influences: No    Diabetes Education Assessment/Progress  Diabetes Disease Process (diabetes disease process and treatment options): Comprehends Key Points, Individual Session, Discussion  Nutrition (Incorporating nutritional management into one's lifestyle): Comprehends Key Points, Individual Session, Discussion  Physical Activity (incorporating physical activity into one's lifestyle): Comprehends Key Points, Individual Session, Discussion  Medications (states correct name, dose, onset, peak, duration, side effects & timing of meds): Comprehends Key Points, Individual Session, Discussion  Monitoring (monitoring blood glucose/other parameters & using results): Comprehends Key  Points, Individual Session, Discussion  Acute Complications (preventing, detecting, and treating acute complications): Comprehends Key Points, Individual Session, Discussion  Chronic Complications (preventing, detecting, and treating chronic complications): Comprehends Key Points, Individual Session, Discussion  Clinical (diabetes, other pertinent medical history, and relevant comorbidities reviewed during visit): Comprehends Key Points, Individual Session, Discussion  Cognitive (knowledge of self-management skills, functional health literacy): Comprehends Key Points, Individual Session, Discussion  Psychosocial (emotional response to diabetes): Comprehends Key Points, Individual Session, Discussion  Diabetes Distress and Support Systems: Comprehends Key Points, Individual Session, Instructed, Discussion, Written Materials Provided  Behavioral (readiness for change, lifestyle practices, self-care behaviors): Comprehends Key Points, Individual Session, Discussion    Goals  Patient has selected/evaluated goals during today's session: Yes, evaluated  Healthy Eating: % Met  Met Percentage : 50%         Diabetes Care Plan/Intervention  Education Plan/Intervention: Individual Follow-Up DSMT    Diabetes Meal Plan  Restrictions: Low Fat, Low Sodium, Restricted Carbohydrate  Calories: 1800  Carbohydrate Per Meal: 30-45g  Carbohydrate Per Snack : 15-20g  Fat: 50  Protein: 135    Today's Self-Management Care Plan was developed with the patient's input and is based on barriers identified during today's assessment.    The long and short-term goals in the care plan were written with the patient/caregiver's input. The patient has agreed to work toward these goals to improve his overall diabetes control.      The patient received a copy of today's self-management plan and verbalized understanding of the care plan, goals, and all of today's instructions.      The patient was encouraged to communicate with his physician and care team  regarding his condition(s) and treatment.  I provided the patient with my contact information today and encouraged him to contact me via phone or patient portal as needed.     Education Units of Time   Time Spent: 60 min

## 2019-11-24 NOTE — PROGRESS NOTES
Reviewed Dexcom download BG readings are improving but are still above goal   Will discuss the VGo with patient over the phone.

## 2019-11-25 ENCOUNTER — TELEPHONE (OUTPATIENT)
Dept: DIABETES | Facility: CLINIC | Age: 54
End: 2019-11-25

## 2019-11-25 NOTE — TELEPHONE ENCOUNTER
Attempted to call patient to discuss Dexcom download.   Left voicemail for patient to call me back

## 2019-11-30 ENCOUNTER — TELEPHONE (OUTPATIENT)
Dept: DIABETES | Facility: CLINIC | Age: 54
End: 2019-11-30

## 2019-11-30 DIAGNOSIS — E11.65 TYPE 2 DIABETES MELLITUS WITH HYPERGLYCEMIA, WITH LONG-TERM CURRENT USE OF INSULIN: Primary | Chronic | ICD-10-CM

## 2019-11-30 DIAGNOSIS — Z79.4 TYPE 2 DIABETES MELLITUS WITH HYPERGLYCEMIA, WITH LONG-TERM CURRENT USE OF INSULIN: Primary | Chronic | ICD-10-CM

## 2019-12-01 NOTE — TELEPHONE ENCOUNTER
Please call patient and inform him that I looked over his Dexcom download.   His readings have improved, but they are still higher than we would like them to be.   His average BG was 162.   I would recommend either stopping the Tresiba and starting the VGo   OR   Continue the Tresiba and add in some Novolog with meals.   It appears that his highest readings are dinner time or after dinner.- we could look at adding in the Novolog just with dinner for now.     Let me know what he would like to do.   Thank you

## 2019-12-02 DIAGNOSIS — E11.65 TYPE 2 DIABETES MELLITUS WITH HYPERGLYCEMIA, WITH LONG-TERM CURRENT USE OF INSULIN: ICD-10-CM

## 2019-12-02 DIAGNOSIS — Z79.4 TYPE 2 DIABETES MELLITUS WITH HYPERGLYCEMIA, WITH LONG-TERM CURRENT USE OF INSULIN: ICD-10-CM

## 2019-12-02 RX ORDER — INSULIN DEGLUDEC 200 U/ML
42 INJECTION, SOLUTION SUBCUTANEOUS NIGHTLY
Qty: 7 SYRINGE | Refills: 1 | Status: SHIPPED | OUTPATIENT
Start: 2019-12-02 | End: 2020-01-15

## 2019-12-03 RX ORDER — INSULIN ASPART 100 [IU]/ML
INJECTION, SOLUTION INTRAVENOUS; SUBCUTANEOUS
Qty: 30 ML | Refills: 6 | Status: SHIPPED | OUTPATIENT
Start: 2019-12-03 | End: 2020-01-28 | Stop reason: SDUPTHER

## 2019-12-03 NOTE — TELEPHONE ENCOUNTER
I spoke with patient regarding his Dexcom download and recommended changes  He has agreed to try the VGo device.   Will send Rx to pharmacy and will place orders for diabetes education.     Plan: stop Tresiba-   Start VGo30-- 4 clicks with meals and 1 click with snack

## 2019-12-05 ENCOUNTER — PATIENT MESSAGE (OUTPATIENT)
Dept: DIABETES | Facility: CLINIC | Age: 54
End: 2019-12-05

## 2019-12-05 ENCOUNTER — TELEPHONE (OUTPATIENT)
Dept: DIABETES | Facility: CLINIC | Age: 54
End: 2019-12-05

## 2019-12-05 NOTE — TELEPHONE ENCOUNTER
Please let patient know that the VGo device will be covered with using the coupon card. It will be free for the first month and then $75 dollars per month.   Now he will have to pay for Novolog or Humalog--- which we have coupon cards for as well.   But he will no longer pay for Tresiba or pen needles.   Please let me know what he says about this... If he doesn't want to do the VGo we need to at least start Novolog/Humalog injections with dinner and sliding scale.   Thank you.

## 2019-12-11 RX ORDER — INSULIN GLARGINE 100 [IU]/ML
INJECTION, SOLUTION SUBCUTANEOUS
Qty: 45 SYRINGE | Refills: 7 | OUTPATIENT
Start: 2019-12-11

## 2019-12-11 RX ORDER — ESCITALOPRAM OXALATE 10 MG/1
TABLET ORAL
Qty: 90 TABLET | Refills: 1 | Status: SHIPPED | OUTPATIENT
Start: 2019-12-11 | End: 2020-06-22

## 2019-12-12 ENCOUNTER — TELEPHONE (OUTPATIENT)
Dept: DIABETES | Facility: CLINIC | Age: 54
End: 2019-12-12

## 2019-12-12 NOTE — TELEPHONE ENCOUNTER
Patient wants to be seen Monday, I unfortunately do not have any openings. I can see him at noon on Tuesday.  Left patient a voicemail.

## 2019-12-17 ENCOUNTER — CLINICAL SUPPORT (OUTPATIENT)
Dept: DIABETES | Facility: CLINIC | Age: 54
End: 2019-12-17
Payer: COMMERCIAL

## 2019-12-17 VITALS — HEIGHT: 71 IN | WEIGHT: 236.13 LBS | BODY MASS INDEX: 33.06 KG/M2

## 2019-12-17 DIAGNOSIS — E78.2 DM TYPE 2 WITH DIABETIC MIXED HYPERLIPIDEMIA: Primary | ICD-10-CM

## 2019-12-17 DIAGNOSIS — E11.69 DM TYPE 2 WITH DIABETIC MIXED HYPERLIPIDEMIA: Primary | ICD-10-CM

## 2019-12-17 DIAGNOSIS — E11.65 TYPE 2 DIABETES MELLITUS WITH HYPERGLYCEMIA, WITH LONG-TERM CURRENT USE OF INSULIN: Chronic | ICD-10-CM

## 2019-12-17 DIAGNOSIS — Z79.4 TYPE 2 DIABETES MELLITUS WITH HYPERGLYCEMIA, WITH LONG-TERM CURRENT USE OF INSULIN: Chronic | ICD-10-CM

## 2019-12-17 PROCEDURE — 99999 PR PBB SHADOW E&M-EST. PATIENT-LVL III: CPT | Mod: PBBFAC,,, | Performed by: DIETITIAN, REGISTERED

## 2019-12-17 PROCEDURE — G0108 PR DIAB MANAGE TRN  PER INDIV: ICD-10-PCS | Mod: S$GLB,,, | Performed by: DIETITIAN, REGISTERED

## 2019-12-17 PROCEDURE — G0108 DIAB MANAGE TRN  PER INDIV: HCPCS | Mod: S$GLB,,, | Performed by: DIETITIAN, REGISTERED

## 2019-12-17 PROCEDURE — 99999 PR PBB SHADOW E&M-EST. PATIENT-LVL III: ICD-10-PCS | Mod: PBBFAC,,, | Performed by: DIETITIAN, REGISTERED

## 2019-12-17 NOTE — PROGRESS NOTES
Diabetes Education  Author: Kathy Watkins RD, CDE  Date: 12/17/2019    Diabetes Care Management Summary  Diabetes Education Record Assessment/Progress: Comprehensive/Group  Current Diabetes Risk Level: Moderate     Last A1c:   Lab Results   Component Value Date    HGBA1C 8.2 (H) 10/16/2019     Last visit with Diabetes Educator: Last Education Visit: Not Found      Diabetes Type  Diabetes Type : Type II    Diabetes History  Diabetes Diagnosis: >10 years  Current Treatment: Injectable, Diet, Oral Medication, Insulin, Insulin pump(starting VGo)  Reviewed Problem List with Patient: Yes    Health Maintenance was reviewed today with patient. Discussed with patient importance of routine eye exams, foot exams/foot care, blood work (i.e.: A1c, microalbumin, and lipid), dental visits, yearly flu vaccine, and pneumonia vaccine as indicated by PCP. Patient verbalized understanding.     Health Maintenance Topics with due status: Not Due       Topic Last Completion Date    TETANUS VACCINE 12/20/2013    Colonoscopy 05/17/2018    Lipid Panel 07/03/2019    Foot Exam 07/16/2019    Eye Exam 07/16/2019    Hemoglobin A1c 10/16/2019    Low Dose Statin 10/23/2019     There are no preventive care reminders to display for this patient.    Nutrition  What type of sweetener do you use?: Equal, Sweet N Low, Splenda  What type of beverages do you drink?: water, other (see comments)(crystal light)  Meal Plan 24 Hour Recall - Breakfast: Eggs  Meal Plan 24 Hour Recall - Lunch: Bell Pepper and Cabbage  Meal Plan 24 Hour Recall - Dinner: lasagna  Meal Plan 24 Hour Recall - Snack: mixed nuts    Monitoring   Self Monitoring : using Dexcom CGM only checks when he feels differently than the sensor is indicating  Blood Glucose Logs: No  Do you use a personal continuous glucose monitor?: Yes  What kind of glucose monitor do you use?: Dexcom  In the last month, how often have you had a low blood sugar reaction?: once  Can you tell when your blood sugar is  too high?: sometimes  How do you treat high blood sugar?: insulin, medication    Exercise   Exercise Type: none  Frequency: Never    Current Diabetes Treatment   Current Treatment: Injectable, Diet, Oral Medication, Insulin, Insulin pump(starting VGo)    Social History  Preferred Learning Method: Face to Face  Primary Support: Self, Spouse, Family  Educational Level: Trade School  Smoking Status: Never a Smoker  Alcohol Use: Monthly                                Barriers to Change  Barriers to Change: None  Learning Challenges : None    Readiness to Learn   Readiness to Learn : Eager    Cultural Influences  Cultural Influences: No    Diabetes Education Assessment/Progress  Diabetes Disease Process (diabetes disease process and treatment options): Comprehends Key Points, Individual Session, Discussion  Nutrition (Incorporating nutritional management into one's lifestyle): Comprehends Key Points, Individual Session, Discussion  Physical Activity (incorporating physical activity into one's lifestyle): Comprehends Key Points, Individual Session, Discussion  Medications (states correct name, dose, onset, peak, duration, side effects & timing of meds): Comprehends Key Points, Individual Session, Discussion, Demonstration, Instructed, Written Materials Provided, Demonstrates Understanding/Competency(verbalizes/demonstrates), Return Demonstration  Monitoring (monitoring blood glucose/other parameters & using results): Comprehends Key Points, Individual Session, Discussion, Demonstration, Instructed, Written Materials Provided, Demonstrates Understanding/Competency (verbalizes/demonstrates), Return Demonstration  Acute Complications (preventing, detecting, and treating acute complications): Comprehends Key Points, Individual Session, Discussion  Chronic Complications (preventing, detecting, and treating chronic complications): Comprehends Key Points, Individual Session, Discussion  Clinical (diabetes, other pertinent  medical history, and relevant comorbidities reviewed during visit): Comprehends Key Points, Individual Session, Discussion  Cognitive (knowledge of self-management skills, functional health literacy): Comprehends Key Points, Individual Session, Discussion  Psychosocial (emotional response to diabetes): Comprehends Key Points, Individual Session, Discussion  Diabetes Distress and Support Systems: Comprehends Key Points, Individual Session, Instructed, Discussion, Written Materials Provided  Behavioral (readiness for change, lifestyle practices, self-care behaviors): Comprehends Key Points, Individual Session, Discussion    Vgo Disposable Insulin Pump Education  Discussed Vgo30 disposable insulin pump. Patient educated on insulin pump therapy, the purpose of insulin pump therapy, advantages and disadvantages of insulin pump therapy vs multiple daily injections, explained the basal rate is 1.25 - patient will receive a little bit of insulin throughout 24 hours for a total of 30 units based on which VGo is used, bolus dose are delivered delivered by the double clicks - each double click administers 2 units of insulin, explained that patient will be doing 4 sets of double clicks before each meal to provide 8 units of insulin, discussed when to change Vgo, demonstrated how to fill VGo with insulin, how to apply vgo and insert the needle, explained the double clicks and what each feature is on the vgo, explained and demonstrated how to safely retract the needle and remove the vgo after 24 hours, discussed ease of usage, carbohydrate counting, demonstrated applying device, inserting needle, administering bolus insulin, retracting needle, and removing/disposing of vgo. Patient states understanding and performed return demonstration. Patient started first vgo in office. Patient provided with written literature and CDE contact information     Goals  Patient has selected/evaluated goals during today's session: Yes,  evaluated  Healthy Eating: In Progress         Diabetes Care Plan/Intervention  Education Plan/Intervention: Individual Follow-Up DSMT    Diabetes Meal Plan  Restrictions: Low Fat, Low Sodium, Restricted Carbohydrate  Calories: 1800  Carbohydrate Per Meal: 30-45g  Carbohydrate Per Snack : 15-20g  Fat: 50  Protein: 135    Today's Self-Management Care Plan was developed with the patient's input and is based on barriers identified during today's assessment.    The long and short-term goals in the care plan were written with the patient/caregiver's input. The patient has agreed to work toward these goals to improve his overall diabetes control.      The patient received a copy of today's self-management plan and verbalized understanding of the care plan, goals, and all of today's instructions.      The patient was encouraged to communicate with his physician and care team regarding his condition(s) and treatment.  I provided the patient with my contact information today and encouraged him to contact me via phone or patient portal as needed.     Education Units of Time   Time Spent: 30 min

## 2019-12-26 DIAGNOSIS — N52.9 ERECTILE DYSFUNCTION, UNSPECIFIED ERECTILE DYSFUNCTION TYPE: ICD-10-CM

## 2019-12-26 RX ORDER — TADALAFIL 20 MG/1
20 TABLET ORAL DAILY PRN
Qty: 30 TABLET | Refills: 11 | Status: SHIPPED | OUTPATIENT
Start: 2019-12-26 | End: 2021-07-26

## 2019-12-26 RX ORDER — LISINOPRIL AND HYDROCHLOROTHIAZIDE 12.5; 2 MG/1; MG/1
1 TABLET ORAL DAILY
Qty: 90 TABLET | Refills: 1 | Status: SHIPPED | OUTPATIENT
Start: 2019-12-26 | End: 2020-09-21

## 2020-01-08 ENCOUNTER — CLINICAL SUPPORT (OUTPATIENT)
Dept: PRIMARY CARE CLINIC | Facility: CLINIC | Age: 55
End: 2020-01-08
Payer: COMMERCIAL

## 2020-01-08 DIAGNOSIS — Z79.4 TYPE 2 DIABETES MELLITUS WITH HYPERGLYCEMIA, WITH LONG-TERM CURRENT USE OF INSULIN: ICD-10-CM

## 2020-01-08 DIAGNOSIS — E11.65 TYPE 2 DIABETES MELLITUS WITH HYPERGLYCEMIA, WITH LONG-TERM CURRENT USE OF INSULIN: ICD-10-CM

## 2020-01-08 LAB
ALBUMIN SERPL BCP-MCNC: 4.6 G/DL (ref 3.5–5.2)
ALP SERPL-CCNC: 64 U/L (ref 38–126)
ALT SERPL W/O P-5'-P-CCNC: 24 U/L (ref 17–63)
ANION GAP SERPL CALC-SCNC: 10 MMOL/L (ref 8–16)
AST SERPL-CCNC: 18 U/L (ref 15–41)
BILIRUB SERPL-MCNC: 0.6 MG/DL (ref 0.3–1.2)
BUN SERPL-MCNC: 24 MG/DL (ref 6–20)
CALCIUM SERPL-MCNC: 10 MG/DL (ref 8.6–10)
CHLORIDE SERPL-SCNC: 103 MMOL/L (ref 101–111)
CHOLEST SERPL-MCNC: 107 MG/DL (ref 80–200)
CHOLEST/HDLC SERPL: 3 {RATIO} (ref 2–5)
CO2 SERPL-SCNC: 25 MMOL/L (ref 23–29)
CREAT SERPL-MCNC: 0.7 MG/DL (ref 0.5–1.4)
EST. GFR  (AFRICAN AMERICAN): >60 ML/MIN/1.73 M^2
EST. GFR  (NON AFRICAN AMERICAN): >60 ML/MIN/1.73 M^2
ESTIMATED AVG GLUCOSE: 180 MG/DL (ref 68–131)
GLUCOSE SERPL-MCNC: 138 MG/DL (ref 74–118)
HBA1C MFR BLD HPLC: 7.9 % (ref 4–5.6)
HDLC SERPL-MCNC: 36 MG/DL (ref 40–75)
HDLC SERPL: 33.6 % (ref 20–50)
LDLC SERPL CALC-MCNC: 57 MG/DL
NONHDLC SERPL-MCNC: 71 MG/DL
POTASSIUM SERPL-SCNC: 4.3 MMOL/L (ref 3.5–5.1)
PROT SERPL-MCNC: 7.9 G/DL (ref 6–8.4)
SODIUM SERPL-SCNC: 138 MMOL/L (ref 136–145)
TRIGL SERPL-MCNC: 72 MG/DL (ref 30–150)
TSH SERPL DL<=0.005 MIU/L-ACNC: 1.99 UIU/ML (ref 0.45–5.33)

## 2020-01-08 PROCEDURE — 84443 ASSAY THYROID STIM HORMONE: CPT

## 2020-01-08 PROCEDURE — 80053 COMPREHEN METABOLIC PANEL: CPT

## 2020-01-08 PROCEDURE — 80061 LIPID PANEL: CPT

## 2020-01-08 PROCEDURE — 83036 HEMOGLOBIN GLYCOSYLATED A1C: CPT

## 2020-01-14 ENCOUNTER — TELEPHONE (OUTPATIENT)
Dept: PRIMARY CARE CLINIC | Facility: CLINIC | Age: 55
End: 2020-01-14

## 2020-01-14 NOTE — TELEPHONE ENCOUNTER
----- Message from Christi Topete sent at 1/14/2020  2:44 PM CST -----  Contact: Patient  Type:  Patient Returning Call    Who Called:  Memo, patient  Who Left Message for Patient:  Arabella  Does the patient know what this is regarding?:  Lab results  Best Call Back Number:  [phone]  Additional Information:  Missed your call, please call him back. Thanks.

## 2020-01-14 NOTE — TELEPHONE ENCOUNTER
Spoke with patient notified him that A1C improved to 7.9. Says he is following up with Dr. Mckoy tomorrow.

## 2020-01-15 ENCOUNTER — TELEPHONE (OUTPATIENT)
Dept: DIABETES | Facility: CLINIC | Age: 55
End: 2020-01-15

## 2020-01-15 ENCOUNTER — OFFICE VISIT (OUTPATIENT)
Dept: PRIMARY CARE CLINIC | Facility: CLINIC | Age: 55
End: 2020-01-15
Payer: COMMERCIAL

## 2020-01-15 VITALS
SYSTOLIC BLOOD PRESSURE: 106 MMHG | WEIGHT: 238 LBS | DIASTOLIC BLOOD PRESSURE: 68 MMHG | TEMPERATURE: 98 F | OXYGEN SATURATION: 97 % | HEIGHT: 71 IN | RESPIRATION RATE: 18 BRPM | BODY MASS INDEX: 33.32 KG/M2 | HEART RATE: 72 BPM

## 2020-01-15 DIAGNOSIS — Z79.4 TYPE 2 DIABETES MELLITUS WITH HYPERGLYCEMIA, WITH LONG-TERM CURRENT USE OF INSULIN: Chronic | ICD-10-CM

## 2020-01-15 DIAGNOSIS — E11.65 TYPE 2 DIABETES MELLITUS WITH HYPERGLYCEMIA, WITH LONG-TERM CURRENT USE OF INSULIN: Chronic | ICD-10-CM

## 2020-01-15 PROCEDURE — 99213 PR OFFICE/OUTPT VISIT, EST, LEVL III, 20-29 MIN: ICD-10-PCS | Mod: S$GLB,,, | Performed by: FAMILY MEDICINE

## 2020-01-15 PROCEDURE — 99213 OFFICE O/P EST LOW 20 MIN: CPT | Mod: S$GLB,,, | Performed by: FAMILY MEDICINE

## 2020-01-15 PROCEDURE — 3078F PR MOST RECENT DIASTOLIC BLOOD PRESSURE < 80 MM HG: ICD-10-PCS | Mod: CPTII,S$GLB,, | Performed by: FAMILY MEDICINE

## 2020-01-15 PROCEDURE — 3074F SYST BP LT 130 MM HG: CPT | Mod: CPTII,S$GLB,, | Performed by: FAMILY MEDICINE

## 2020-01-15 PROCEDURE — 3008F BODY MASS INDEX DOCD: CPT | Mod: CPTII,S$GLB,, | Performed by: FAMILY MEDICINE

## 2020-01-15 PROCEDURE — 99999 PR PBB SHADOW E&M-EST. PATIENT-LVL III: CPT | Mod: PBBFAC,,, | Performed by: FAMILY MEDICINE

## 2020-01-15 PROCEDURE — 3078F DIAST BP <80 MM HG: CPT | Mod: CPTII,S$GLB,, | Performed by: FAMILY MEDICINE

## 2020-01-15 PROCEDURE — 3008F PR BODY MASS INDEX (BMI) DOCUMENTED: ICD-10-PCS | Mod: CPTII,S$GLB,, | Performed by: FAMILY MEDICINE

## 2020-01-15 PROCEDURE — 3074F PR MOST RECENT SYSTOLIC BLOOD PRESSURE < 130 MM HG: ICD-10-PCS | Mod: CPTII,S$GLB,, | Performed by: FAMILY MEDICINE

## 2020-01-15 PROCEDURE — 99999 PR PBB SHADOW E&M-EST. PATIENT-LVL III: ICD-10-PCS | Mod: PBBFAC,,, | Performed by: FAMILY MEDICINE

## 2020-01-15 NOTE — PROGRESS NOTES
"Subjective:       Patient ID: Cedrick Saunders is a 54 y.o. male.    Chief Complaint: Diabetes (here to review lab results )    A1C down from 8.2 to 7.9 over the past 3 months. Blood sugar trends have been very good overall, using CGM. Has had 1 low down to 54 a few weeks ago, thinks due to not eating enough.    Review of Systems   Constitutional: Negative for fever.   HENT: Positive for congestion.    Respiratory: Negative for shortness of breath.    Cardiovascular: Negative for chest pain.   Endocrine: Negative for polydipsia and polyuria.   Genitourinary: Negative for difficulty urinating.   Musculoskeletal: Positive for arthralgias.   Psychiatric/Behavioral: Negative for agitation and confusion.       Objective:      Vitals:    01/15/20 0805   BP: 106/68   BP Location: Right arm   Patient Position: Sitting   BP Method: Large (Manual)   Pulse: 72   Resp: 18   Temp: 98.1 °F (36.7 °C)   TempSrc: Oral   SpO2: 97%   Weight: 108 kg (238 lb)   Height: 5' 11" (1.803 m)     Physical Exam   Constitutional: He is oriented to person, place, and time. He appears well-developed and well-nourished.   HENT:   Head: Normocephalic and atraumatic.   Cardiovascular: Normal rate, regular rhythm and normal heart sounds.   Pulmonary/Chest: Effort normal and breath sounds normal.   Musculoskeletal: He exhibits no edema.   Neurological: He is alert and oriented to person, place, and time.   Skin: Skin is warm and dry.   Nursing note and vitals reviewed.      Lab Results   Component Value Date    WBC 4.30 07/03/2019    HGB 14.7 07/03/2019    HCT 44.1 07/03/2019     07/03/2019    CHOL 107 01/08/2020    TRIG 72 01/08/2020    HDL 36 (L) 01/08/2020    ALT 24 01/08/2020    AST 18 01/08/2020     01/08/2020    K 4.3 01/08/2020     01/08/2020    CREATININE 0.7 01/08/2020    BUN 24 (H) 01/08/2020    CO2 25 01/08/2020    TSH 1.99 01/08/2020    PSA 2.5 07/03/2019    HGBA1C 7.9 (H) 01/08/2020      Assessment:       1. Type 2 " "diabetes mellitus with hyperglycemia, with long-term current use of insulin        Plan:       Type 2 diabetes mellitus with hyperglycemia, with long-term current use of insulin  -     Basic metabolic panel; Future; Expected date: 04/15/2020  -     Hemoglobin A1c; Future; Expected date: 04/15/2020  Improving, continue current POC, RTC in 3 months    Medication List with Changes/Refills   Current Medications    ASPIRIN (ECOTRIN) 81 MG EC TABLET    Take 81 mg by mouth once daily.    ATORVASTATIN (LIPITOR) 20 MG TABLET    TAKE 1 TABLET ONCE A DAY ORALLY FOR 90 DAYS    BLOOD-GLUCOSE METER,CONTINUOUS (DEXCOM G6 ) MISC    1 Device by Misc.(Non-Drug; Combo Route) route once. for 1 dose    BLOOD-GLUCOSE SENSOR (DEXCOM G6 SENSOR) HAROON    1 sensor every 10 days    BLOOD-GLUCOSE TRANSMITTER (DEXCOM G6 TRANSMITTER) HAROON    1 transmitter every 3 months    DAPAGLIFLOZIN (FARXIGA) 10 MG TAB    Take 10 mg by mouth once daily.    ESCITALOPRAM OXALATE (LEXAPRO) 10 MG TABLET    TAKE 1 TABLET BY MOUTH EVERY DAY    INSULIN ASPART U-100 (NOVOLOG) 100 UNIT/ML INJECTION    To use with VGo30 with 4 clicks with meals. Max TDD of 100 units    LISINOPRIL-HYDROCHLOROTHIAZIDE (PRINZIDE,ZESTORETIC) 20-12.5 MG PER TABLET    Take 1 tablet by mouth once daily.    METFORMIN (GLUCOPHAGE) 1000 MG TABLET    Take 1 tablet (1,000 mg total) by mouth 2 (two) times daily with meals.    PEN NEEDLE, DIABETIC (BD ULTRA-FINE YAMILETH PEN NEEDLE) 32 GAUGE X 5/32" NDLE    To use daily with Tresiba injections.    SEMAGLUTIDE (OZEMPIC) 1 MG/DOSE (2 MG/1.5 ML) PNIJ    Inject 1 mg weekly    SUB-Q INSULIN DEVICE, 30 UNIT (V-GO 30) HAROON    Use 1 Device by Misc.(Non-Drug; Combo Route) route once daily.    TADALAFIL (CIALIS) 20 MG TAB    Take 1 tablet (20 mg total) by mouth daily as needed.   Discontinued Medications    ACCU-CHEK GUIDE ME GLUCOSE MTR [facility]    CHECK BLOOD SUGAR 4 TIMES A DAY    BLOOD SUGAR DIAGNOSTIC STRP    1 strip by Misc.(Non-Drug; Combo Route) route " 4 (four) times daily.    INSULIN DEGLUDEC (TRESIBA FLEXTOUCH U-200) 200 UNIT/ML (3 ML) INPN    Inject 42 Units into the skin every evening.    LANCETS MISC    To check BG 4 times daily, to use with insurance preferred meter

## 2020-01-15 NOTE — TELEPHONE ENCOUNTER
pateint states that Lizzy discussed going up to the VGo 40 if his numbers didn't come down. Patient is wanting to try the 40. If you are okay with the change please send rx. It will just increase his basal from 30U a day to 40U. [facility] [address], patient is concerned because his insurance set [facility] as the primary [facility] they can use.

## 2020-01-28 ENCOUNTER — TELEPHONE (OUTPATIENT)
Dept: PRIMARY CARE CLINIC | Facility: CLINIC | Age: 55
End: 2020-01-28

## 2020-01-28 DIAGNOSIS — Z79.4 TYPE 2 DIABETES MELLITUS WITH HYPERGLYCEMIA, WITH LONG-TERM CURRENT USE OF INSULIN: Chronic | ICD-10-CM

## 2020-01-28 DIAGNOSIS — E11.65 TYPE 2 DIABETES MELLITUS WITH HYPERGLYCEMIA, WITH LONG-TERM CURRENT USE OF INSULIN: Chronic | ICD-10-CM

## 2020-01-28 RX ORDER — INSULIN ASPART 100 [IU]/ML
INJECTION, SOLUTION INTRAVENOUS; SUBCUTANEOUS
Qty: 30 ML | Refills: 6 | Status: SHIPPED | OUTPATIENT
Start: 2020-01-28 | End: 2020-08-17

## 2020-03-20 RX ORDER — ATORVASTATIN CALCIUM 20 MG/1
TABLET, FILM COATED ORAL
Qty: 90 TABLET | Refills: 3 | Status: SHIPPED | OUTPATIENT
Start: 2020-03-20 | End: 2020-10-13 | Stop reason: SDUPTHER

## 2020-04-20 DIAGNOSIS — E11.65 TYPE 2 DIABETES MELLITUS WITH HYPERGLYCEMIA, WITH LONG-TERM CURRENT USE OF INSULIN: Primary | Chronic | ICD-10-CM

## 2020-04-20 DIAGNOSIS — Z79.4 TYPE 2 DIABETES MELLITUS WITH HYPERGLYCEMIA, WITH LONG-TERM CURRENT USE OF INSULIN: Primary | Chronic | ICD-10-CM

## 2020-06-25 ENCOUNTER — TELEPHONE (OUTPATIENT)
Dept: DIABETES | Facility: CLINIC | Age: 55
End: 2020-06-25

## 2020-06-25 DIAGNOSIS — E11.65 TYPE 2 DIABETES MELLITUS WITH HYPERGLYCEMIA, WITH LONG-TERM CURRENT USE OF INSULIN: ICD-10-CM

## 2020-06-25 DIAGNOSIS — Z79.4 TYPE 2 DIABETES MELLITUS WITH HYPERGLYCEMIA, WITH LONG-TERM CURRENT USE OF INSULIN: ICD-10-CM

## 2020-06-25 RX ORDER — SEMAGLUTIDE 1.34 MG/ML
INJECTION, SOLUTION SUBCUTANEOUS
Qty: 6 SYRINGE | Refills: 1 | Status: SHIPPED | OUTPATIENT
Start: 2020-06-25 | End: 2020-10-13 | Stop reason: SDUPTHER

## 2020-06-25 NOTE — TELEPHONE ENCOUNTER
Left vm for pt to schedule a follow up appt with labs prior. Approved the refill request on Ozempic, however it's been sometime since his last visit with us. Must schedule appt.

## 2020-07-01 DIAGNOSIS — E11.65 TYPE 2 DIABETES MELLITUS WITH HYPERGLYCEMIA, WITH LONG-TERM CURRENT USE OF INSULIN: ICD-10-CM

## 2020-07-01 DIAGNOSIS — Z79.4 TYPE 2 DIABETES MELLITUS WITH HYPERGLYCEMIA, WITH LONG-TERM CURRENT USE OF INSULIN: ICD-10-CM

## 2020-07-01 RX ORDER — DAPAGLIFLOZIN 10 MG/1
TABLET, FILM COATED ORAL
Qty: 90 TABLET | Refills: 0 | Status: SHIPPED | OUTPATIENT
Start: 2020-07-01 | End: 2020-09-29

## 2020-07-01 RX ORDER — METFORMIN HYDROCHLORIDE 1000 MG/1
TABLET ORAL
Qty: 180 TABLET | Refills: 0 | Status: SHIPPED | OUTPATIENT
Start: 2020-07-01 | End: 2020-10-13 | Stop reason: SDUPTHER

## 2020-07-01 NOTE — TELEPHONE ENCOUNTER
I sent in a refill on his Farxiga and metformin.  We need to reach out to him to schedule a follow-up appointment with labs prior.  Thank you

## 2020-08-15 DIAGNOSIS — Z79.4 TYPE 2 DIABETES MELLITUS WITH HYPERGLYCEMIA, WITH LONG-TERM CURRENT USE OF INSULIN: Chronic | ICD-10-CM

## 2020-08-15 DIAGNOSIS — E11.65 TYPE 2 DIABETES MELLITUS WITH HYPERGLYCEMIA, WITH LONG-TERM CURRENT USE OF INSULIN: Chronic | ICD-10-CM

## 2020-08-17 RX ORDER — INSULIN ASPART 100 [IU]/ML
INJECTION, SOLUTION INTRAVENOUS; SUBCUTANEOUS
Qty: 90 ML | Refills: 0 | Status: SHIPPED | OUTPATIENT
Start: 2020-08-17 | End: 2020-09-15

## 2020-08-18 DIAGNOSIS — E11.65 TYPE 2 DIABETES MELLITUS WITH HYPERGLYCEMIA, WITH LONG-TERM CURRENT USE OF INSULIN: Chronic | ICD-10-CM

## 2020-08-18 DIAGNOSIS — Z79.4 TYPE 2 DIABETES MELLITUS WITH HYPERGLYCEMIA, WITH LONG-TERM CURRENT USE OF INSULIN: Chronic | ICD-10-CM

## 2020-08-18 RX ORDER — SUB-Q INSULIN DEVICE, 40 UNIT
EACH MISCELLANEOUS
Qty: 30 EACH | Refills: 3 | Status: SHIPPED | OUTPATIENT
Start: 2020-08-18 | End: 2020-10-13 | Stop reason: SDUPTHER

## 2020-08-24 ENCOUNTER — NURSE TRIAGE (OUTPATIENT)
Dept: ADMINISTRATIVE | Facility: CLINIC | Age: 55
End: 2020-08-24

## 2020-08-24 ENCOUNTER — OFFICE VISIT (OUTPATIENT)
Dept: PRIMARY CARE CLINIC | Facility: CLINIC | Age: 55
End: 2020-08-24
Payer: COMMERCIAL

## 2020-08-24 VITALS
DIASTOLIC BLOOD PRESSURE: 68 MMHG | WEIGHT: 238.13 LBS | TEMPERATURE: 98 F | HEIGHT: 71 IN | HEART RATE: 76 BPM | OXYGEN SATURATION: 96 % | RESPIRATION RATE: 18 BRPM | SYSTOLIC BLOOD PRESSURE: 110 MMHG | BODY MASS INDEX: 33.34 KG/M2

## 2020-08-24 DIAGNOSIS — E11.65 TYPE 2 DIABETES MELLITUS WITH HYPERGLYCEMIA, WITH LONG-TERM CURRENT USE OF INSULIN: Chronic | ICD-10-CM

## 2020-08-24 DIAGNOSIS — Z11.4 SCREENING FOR HIV (HUMAN IMMUNODEFICIENCY VIRUS): ICD-10-CM

## 2020-08-24 DIAGNOSIS — Z12.5 PROSTATE CANCER SCREENING: ICD-10-CM

## 2020-08-24 DIAGNOSIS — Z79.4 TYPE 2 DIABETES MELLITUS WITH HYPERGLYCEMIA, WITH LONG-TERM CURRENT USE OF INSULIN: Chronic | ICD-10-CM

## 2020-08-24 DIAGNOSIS — L02.414 ABSCESS OF LEFT FOREARM: Primary | ICD-10-CM

## 2020-08-24 PROCEDURE — 99999 PR PBB SHADOW E&M-EST. PATIENT-LVL IV: ICD-10-PCS | Mod: PBBFAC,,, | Performed by: FAMILY MEDICINE

## 2020-08-24 PROCEDURE — 3078F DIAST BP <80 MM HG: CPT | Mod: CPTII,S$GLB,, | Performed by: FAMILY MEDICINE

## 2020-08-24 PROCEDURE — 3008F BODY MASS INDEX DOCD: CPT | Mod: CPTII,S$GLB,, | Performed by: FAMILY MEDICINE

## 2020-08-24 PROCEDURE — 3008F PR BODY MASS INDEX (BMI) DOCUMENTED: ICD-10-PCS | Mod: CPTII,S$GLB,, | Performed by: FAMILY MEDICINE

## 2020-08-24 PROCEDURE — 99213 PR OFFICE/OUTPT VISIT, EST, LEVL III, 20-29 MIN: ICD-10-PCS | Mod: 25,S$GLB,, | Performed by: FAMILY MEDICINE

## 2020-08-24 PROCEDURE — 3078F PR MOST RECENT DIASTOLIC BLOOD PRESSURE < 80 MM HG: ICD-10-PCS | Mod: CPTII,S$GLB,, | Performed by: FAMILY MEDICINE

## 2020-08-24 PROCEDURE — 99999 PR PBB SHADOW E&M-EST. PATIENT-LVL IV: CPT | Mod: PBBFAC,,, | Performed by: FAMILY MEDICINE

## 2020-08-24 PROCEDURE — 3074F PR MOST RECENT SYSTOLIC BLOOD PRESSURE < 130 MM HG: ICD-10-PCS | Mod: CPTII,S$GLB,, | Performed by: FAMILY MEDICINE

## 2020-08-24 PROCEDURE — 3051F PR MOST RECENT HEMOGLOBIN A1C LEVEL 7.0 - < 8.0%: ICD-10-PCS | Mod: CPTII,S$GLB,, | Performed by: FAMILY MEDICINE

## 2020-08-24 PROCEDURE — 3051F HG A1C>EQUAL 7.0%<8.0%: CPT | Mod: CPTII,S$GLB,, | Performed by: FAMILY MEDICINE

## 2020-08-24 PROCEDURE — 3074F SYST BP LT 130 MM HG: CPT | Mod: CPTII,S$GLB,, | Performed by: FAMILY MEDICINE

## 2020-08-24 PROCEDURE — 10060 I&D ABSCESS SIMPLE/SINGLE: CPT | Mod: S$GLB,,, | Performed by: FAMILY MEDICINE

## 2020-08-24 PROCEDURE — 10060 INCISION & DRAINAGE: ICD-10-PCS | Mod: S$GLB,,, | Performed by: FAMILY MEDICINE

## 2020-08-24 PROCEDURE — 99213 OFFICE O/P EST LOW 20 MIN: CPT | Mod: 25,S$GLB,, | Performed by: FAMILY MEDICINE

## 2020-08-24 RX ORDER — SULFAMETHOXAZOLE AND TRIMETHOPRIM 800; 160 MG/1; MG/1
1 TABLET ORAL 2 TIMES DAILY
Qty: 20 TABLET | Refills: 0 | Status: SHIPPED | OUTPATIENT
Start: 2020-08-24 | End: 2020-09-03

## 2020-08-24 NOTE — PROCEDURES
"Incision & Drainage    Date/Time: 8/24/2020 3:30 PM  Performed by: Alex Mckoy MD  Authorized by: Alex Mckoy MD     Time out: Immediately prior to procedure a "time out" was called to verify the correct patient, procedure, equipment, support staff and site/side marked as required.    Consent Done?:  Yes (Verbal)    Type:  Abscess  Body area:  Upper extremity  Location details:  Left arm  Anesthesia:  Local infiltration  Local anesthetic: lidocaine 2% with epinephrine  Anesthetic total (ml):  1  Scalpel size:  11  Incision type:  Single straight  Complexity:  Simple  Drainage:  Pus  Drainage amount:  Scant  Wound treatment:  Incision, wound left open, drainage, expression of material and wound packed  Packing material:  1/4 in gauze  Patient tolerance:  Patient tolerated the procedure well with no immediate complications    Patient was instructed to remove packing in 48 hr, then change dressing daily      "

## 2020-08-24 NOTE — TELEPHONE ENCOUNTER
Reason for Disposition   Red area or streak and large (> 2 in. or 5 cm)    Additional Information   Negative: Shock suspected (e.g., cold/pale/clammy skin, too weak to stand, low BP, rapid pulse)   Negative: Similar pain previously and it was from 'heart attack'   Negative: Similar pain previously from 'angina' and not relieved by nitroglycerin   Negative: Sounds like a life-threatening emergency to the triager   Negative: Followed an injury to arm   Negative: Chest pain   Negative: Wound looks infected   Negative: Elbow pain is the main symptom   Negative: Hand or wrist pain is the main symptom   Negative: Difficulty breathing or unusual sweating (e.g., sweating without exertion)   Negative: Chest pain lasting longer than 5 minutes   Negative: Age > 40 and no obvious cause for pain, pain still present even when not moving the arm   Negative: Fever and red area (or area very tender to touch)   Negative: Fever and swollen joint   Negative: Entire arm is swollen   Negative: Patient sounds very sick or weak to the triager   Negative: SEVERE pain (e.g., excruciating, unable to do any normal activities)    Protocols used: ARM PAIN-A-OH    Pt requesting to see Dr. Mckoy. Stated he has a history of getting staff infections all over his body. Stated today his left arm (below the elbow) is red, swollen, and hot. Stated the area has tenderness and pain. Per triage protocol, advised to see PCP or a Physician within 2 hrs. Advised if PCP cannot see today, then go to an Urgent Care or ED for evaluation. Advised a message will be sent to the PCP's office to contact him. Pt verbalized understanding.

## 2020-08-24 NOTE — PROGRESS NOTES
"Subjective:       Patient ID: Cedrick Saunders is a 54 y.o. male.    Chief Complaint: Abscess    Abscess  Chronicity:  NewProgression Since Onset: gradually worsening  Location:  Shoulder/arm  Associated Symptoms: no fever, no chills, no sweats  Characteristics: painful, redness and swelling    Characteristics: not draining and no bruising    Treatments Tried:  Nothing  Relieved by:  Nothing  Worsened by:  Nothing    Review of Systems   Constitutional: Negative for chills and fever.   Respiratory: Negative for shortness of breath.    Cardiovascular: Negative for chest pain.   Skin: Positive for color change. Negative for wound.   Allergic/Immunologic: Negative for immunocompromised state.       Objective:      Vitals:    08/24/20 1554   BP: 110/68   BP Location: Left arm   Patient Position: Sitting   BP Method: Large (Manual)   Pulse: 76   Resp: 18   Temp: 97.7 °F (36.5 °C)   TempSrc: Temporal   SpO2: 96%   Weight: 108 kg (238 lb 1.6 oz)   Height: 5' 11" (1.803 m)     Physical Exam  Vitals signs and nursing note reviewed.   Constitutional:       Appearance: Normal appearance.   Skin:         Neurological:      Mental Status: He is alert and oriented to person, place, and time.   Psychiatric:         Mood and Affect: Mood normal.         Behavior: Behavior normal.         Lab Results   Component Value Date    WBC 4.30 07/03/2019    HGB 14.7 07/03/2019    HCT 44.1 07/03/2019     07/03/2019    CHOL 107 01/08/2020    TRIG 72 01/08/2020    HDL 36 (L) 01/08/2020    ALT 24 01/08/2020    AST 18 01/08/2020     01/08/2020    K 4.3 01/08/2020     01/08/2020    CREATININE 0.7 01/08/2020    BUN 24 (H) 01/08/2020    CO2 25 01/08/2020    TSH 1.99 01/08/2020    PSA 2.5 07/03/2019    HGBA1C 7.9 (H) 01/08/2020      Assessment:       1. Abscess of left forearm    2. Type 2 diabetes mellitus with hyperglycemia, with long-term current use of insulin    3. Prostate cancer screening    4. Screening for HIV (human " immunodeficiency virus)        Plan:       Abscess of left forearm  -     sulfamethoxazole-trimethoprim 800-160mg (BACTRIM DS) 800-160 mg Tab; Take 1 tablet by mouth 2 (two) times daily. for 10 days  Dispense: 20 tablet; Refill: 0    Type 2 diabetes mellitus with hyperglycemia, with long-term current use of insulin  -     Comprehensive metabolic panel; Future; Expected date: 08/24/2020  -     Hemoglobin A1C; Future; Expected date: 08/24/2020  -     Microalbumin/creatinine urine ratio; Future; Expected date: 08/24/2020    Prostate cancer screening  -     PSA, Screening; Future; Expected date: 08/24/2020    Screening for HIV (human immunodeficiency virus)  -     HIV 1/2 Ag/Ab (4th Gen); Future; Expected date: 08/24/2020      Medication List with Changes/Refills   New Medications    SULFAMETHOXAZOLE-TRIMETHOPRIM 800-160MG (BACTRIM DS) 800-160 MG TAB    Take 1 tablet by mouth 2 (two) times daily. for 10 days   Current Medications    ASPIRIN (ECOTRIN) 81 MG EC TABLET    Take 81 mg by mouth once daily.    ATORVASTATIN (LIPITOR) 20 MG TABLET    TAKE 1 TABLET ONCE A DAY ORALLY FOR 90 DAYS    BLOOD-GLUCOSE METER,CONTINUOUS (DEXCOM G6 ) MISC    1 Device by Misc.(Non-Drug; Combo Route) route once. for 1 dose    BLOOD-GLUCOSE SENSOR (DEXCOM G6 SENSOR) HAROON    1 sensor every 10 days    BLOOD-GLUCOSE TRANSMITTER (DEXCOM G6 TRANSMITTER) HAROON    1 transmitter every 3 months    ESCITALOPRAM OXALATE (LEXAPRO) 10 MG TABLET    TAKE 1 TABLET BY MOUTH EVERY DAY    FARXIGA 10 MG TAB    TAKE 1 TABLET BY MOUTH EVERY DAY    INSULIN ASPART U-100 (NOVOLOG U-100 INSULIN ASPART) 100 UNIT/ML INJECTION    TO USE WITH VGO40 WITH 4 CLICKS WITH MEALS. MAX TDD  UNITS    LISINOPRIL-HYDROCHLOROTHIAZIDE (PRINZIDE,ZESTORETIC) 20-12.5 MG PER TABLET    Take 1 tablet by mouth once daily.    METFORMIN (GLUCOPHAGE) 1000 MG TABLET    TAKE 1 TABLET BY MOUTH TWICE A DAY WITH MEALS    OZEMPIC 1 MG/DOSE (2 MG/1.5 ML) PNIJ    INJECT 1 MG SUBCUTANEOUSLY  "WEEKLY    PEN NEEDLE, DIABETIC (BD ULTRA-FINE YAMILETH PEN NEEDLE) 32 GAUGE X 5/32" NDLE    To use daily with Tresiba injections.    TADALAFIL (CIALIS) 20 MG TAB    Take 1 tablet (20 mg total) by mouth daily as needed.    V-GO 40 HAROON    USE ONCE DAILY.   Discontinued Medications    SUB-Q INSULIN DEVICE, 30 UNIT (V-GO 30) HAROON    Use 1 Device by Misc.(Non-Drug; Combo Route) route once daily.           "

## 2020-09-15 DIAGNOSIS — E11.65 TYPE 2 DIABETES MELLITUS WITH HYPERGLYCEMIA, WITH LONG-TERM CURRENT USE OF INSULIN: Chronic | ICD-10-CM

## 2020-09-15 DIAGNOSIS — Z79.4 TYPE 2 DIABETES MELLITUS WITH HYPERGLYCEMIA, WITH LONG-TERM CURRENT USE OF INSULIN: Chronic | ICD-10-CM

## 2020-09-15 RX ORDER — INSULIN ASPART 100 [IU]/ML
INJECTION, SOLUTION INTRAVENOUS; SUBCUTANEOUS
Qty: 40 ML | Refills: 3 | Status: SHIPPED | OUTPATIENT
Start: 2020-09-15 | End: 2020-10-13 | Stop reason: SDUPTHER

## 2020-10-05 ENCOUNTER — PATIENT MESSAGE (OUTPATIENT)
Dept: ADMINISTRATIVE | Facility: HOSPITAL | Age: 55
End: 2020-10-05

## 2020-10-09 ENCOUNTER — TELEPHONE (OUTPATIENT)
Dept: PRIMARY CARE CLINIC | Facility: CLINIC | Age: 55
End: 2020-10-09

## 2020-10-09 NOTE — TELEPHONE ENCOUNTER
----- Message from Danielle Diaz sent at 10/9/2020 10:36 AM CDT -----  Regarding: Return call  Contact: [phone] @ Patient  Patient is returning a phone call.  Who left a message for the patient: Ramon  Does patient know what this is regarding:    Comments:

## 2020-10-11 ENCOUNTER — PATIENT OUTREACH (OUTPATIENT)
Dept: ADMINISTRATIVE | Facility: OTHER | Age: 55
End: 2020-10-11

## 2020-10-11 DIAGNOSIS — E11.69 DM TYPE 2 WITH DIABETIC MIXED HYPERLIPIDEMIA: Primary | ICD-10-CM

## 2020-10-11 DIAGNOSIS — E78.2 DM TYPE 2 WITH DIABETIC MIXED HYPERLIPIDEMIA: Primary | ICD-10-CM

## 2020-10-11 NOTE — PROGRESS NOTES
LINKS immunization registry updated  Care Everywhere updated  Health Maintenance updated  Chart reviewed for overdue Proactive [facility] Encounters ([facility]) health maintenance testing (CRS, Breast Ca, Diabetic Eye Exam)   Orders entered: diabetic eye screening photo

## 2020-10-12 ENCOUNTER — OFFICE VISIT (OUTPATIENT)
Dept: DIABETES | Facility: CLINIC | Age: 55
End: 2020-10-12
Payer: COMMERCIAL

## 2020-10-12 VITALS
HEIGHT: 71 IN | WEIGHT: 238 LBS | SYSTOLIC BLOOD PRESSURE: 131 MMHG | DIASTOLIC BLOOD PRESSURE: 76 MMHG | BODY MASS INDEX: 33.32 KG/M2 | OXYGEN SATURATION: 97 % | HEART RATE: 76 BPM

## 2020-10-12 DIAGNOSIS — Z91.199 NONCOMPLIANCE WITH DIABETES TREATMENT: ICD-10-CM

## 2020-10-12 DIAGNOSIS — E78.2 MIXED HYPERLIPIDEMIA: ICD-10-CM

## 2020-10-12 DIAGNOSIS — Z91.148 NONCOMPLIANCE W/MEDICATION TREATMENT DUE TO INTERMIT USE OF MEDICATION: ICD-10-CM

## 2020-10-12 DIAGNOSIS — E66.9 OBESITY (BMI 30-39.9): ICD-10-CM

## 2020-10-12 DIAGNOSIS — Z71.9 HEALTH EDUCATION/COUNSELING: ICD-10-CM

## 2020-10-12 DIAGNOSIS — Z79.4 TYPE 2 DIABETES MELLITUS WITH HYPERGLYCEMIA, WITH LONG-TERM CURRENT USE OF INSULIN: Primary | Chronic | ICD-10-CM

## 2020-10-12 DIAGNOSIS — I10 ESSENTIAL HYPERTENSION: ICD-10-CM

## 2020-10-12 DIAGNOSIS — E11.65 TYPE 2 DIABETES MELLITUS WITH HYPERGLYCEMIA, WITH LONG-TERM CURRENT USE OF INSULIN: Primary | Chronic | ICD-10-CM

## 2020-10-12 PROCEDURE — 99999 PR PBB SHADOW E&M-EST. PATIENT-LVL V: CPT | Mod: PBBFAC,,, | Performed by: NURSE PRACTITIONER

## 2020-10-12 PROCEDURE — 99999 PR PBB SHADOW E&M-EST. PATIENT-LVL V: ICD-10-PCS | Mod: PBBFAC,,, | Performed by: NURSE PRACTITIONER

## 2020-10-12 PROCEDURE — 99214 OFFICE O/P EST MOD 30 MIN: CPT | Mod: S$GLB,,, | Performed by: NURSE PRACTITIONER

## 2020-10-12 PROCEDURE — 3078F DIAST BP <80 MM HG: CPT | Mod: CPTII,S$GLB,, | Performed by: NURSE PRACTITIONER

## 2020-10-12 PROCEDURE — 3052F HG A1C>EQUAL 8.0%<EQUAL 9.0%: CPT | Mod: CPTII,S$GLB,, | Performed by: NURSE PRACTITIONER

## 2020-10-12 PROCEDURE — 3008F PR BODY MASS INDEX (BMI) DOCUMENTED: ICD-10-PCS | Mod: CPTII,S$GLB,, | Performed by: NURSE PRACTITIONER

## 2020-10-12 PROCEDURE — 3078F PR MOST RECENT DIASTOLIC BLOOD PRESSURE < 80 MM HG: ICD-10-PCS | Mod: CPTII,S$GLB,, | Performed by: NURSE PRACTITIONER

## 2020-10-12 PROCEDURE — 3075F SYST BP GE 130 - 139MM HG: CPT | Mod: CPTII,S$GLB,, | Performed by: NURSE PRACTITIONER

## 2020-10-12 PROCEDURE — 3008F BODY MASS INDEX DOCD: CPT | Mod: CPTII,S$GLB,, | Performed by: NURSE PRACTITIONER

## 2020-10-12 PROCEDURE — 99214 PR OFFICE/OUTPT VISIT, EST, LEVL IV, 30-39 MIN: ICD-10-PCS | Mod: S$GLB,,, | Performed by: NURSE PRACTITIONER

## 2020-10-12 PROCEDURE — 3075F PR MOST RECENT SYSTOLIC BLOOD PRESS GE 130-139MM HG: ICD-10-PCS | Mod: CPTII,S$GLB,, | Performed by: NURSE PRACTITIONER

## 2020-10-12 PROCEDURE — 3052F PR MOST RECENT HEMOGLOBIN A1C LEVEL 8.0 - < 9.0%: ICD-10-PCS | Mod: CPTII,S$GLB,, | Performed by: NURSE PRACTITIONER

## 2020-10-12 RX ORDER — FLASH GLUCOSE SENSOR
1 KIT MISCELLANEOUS
Qty: 7 KIT | Refills: 3 | Status: SHIPPED | OUTPATIENT
Start: 2020-10-12 | End: 2021-03-29

## 2020-10-12 NOTE — PROGRESS NOTES
CC:   Chief Complaint   Patient presents with    Diabetes Mellitus       HPI: Cedrick Saunders is a 55 y.o. male presents for a follow up visit today for the management of T2DM.     He was diagnosed with Type 2 diabetes at age 37 with s/s of polyuria, nocturia, and polydipsia and BG was 600. A1c at diagnosis was > 12%. He was initially on oral medications Actos and Metformin. Insulin therapy was started in 2016.       Our last visit was in October 2019.  Following that visit he started the V Go 30--and we titrated up to the V Go 40.  He continued on his metformin, Farxiga, Ozempic.  He has been off of his Dexcom for a couple of months now due to the cost. His Dexcom was previously free and is now going to cost $300 for a 3 month supply from [facility]. We attempted to get it from [facility] and cost was still an issue. Without the personal CGM, he has not been monitoring his BG at all.   A1c has increased to 8.9%. He thought it would be higher. He has been eating poorly and suffering with depressed mood since his father passed away in August. He will wear his V Go for 2-3 days.  He reports not being aware that he needed to change the V Go every 24 hrs.  Reports leaving it on as he thought it still had insulin in it.  Also reports missing his Ozempic for a month or 2- he just forgot about it.  This likely contributed to his elevated A1c.   He is aware that he is due for an eye exam.       Family hx of diabetes: father, paternal grandmother   Hospitalized for diabetes: denies     No personal or FH of thyroid cancer or personal of pancreatic cancer or pancreatitis.     DIABETES COMPLICATIONS: none      Diabetes Management Status    ASA:  Yes - 81 mg daily     Statin: Taking  ACE/ARB: Taking    Screening or Prevention Patient's value Goal Complete/Controlled?   HgA1C Testing and Control   Lab Results   Component Value Date    HGBA1C 8.9 (H) 10/08/2020      Annually/Less than 8% No   Lipid profile :  01/08/2020 Annually Yes   LDL control Lab Results   Component Value Date    LDLCALC 57 01/08/2020    Annually/Less than 100 mg/dl  Yes   Nephropathy screening Lab Results   Component Value Date    LABMICR 3.0 10/08/2020     Lab Results   Component Value Date    PROTEINUA Trace (A) 05/19/2006    Annually Yes   Blood pressure BP Readings from Last 1 Encounters:   10/12/20 131/76    Less than 140/90 Yes   Dilated retinal exam : 07/16/2019- Annually No   Foot exam   : 10/12/2020 Annually Yes       CURRENT A1C:    Hemoglobin A1C   Date Value Ref Range Status   10/08/2020 8.9 (H) 4.0 - 5.6 % Final     Comment:     ADA Screening Guidelines:  5.7-6.4%  Consistent with prediabetes  >or=6.5%  Consistent with diabetes  High levels of fetal hemoglobin interfere with the HbA1C  assay. Heterozygous hemoglobin variants (HbS, HgC, etc)do  not significantly interfere with this assay.   However, presence of multiple variants may affect accuracy.     01/08/2020 7.9 (H) 4.0 - 5.6 % Final     Comment:     ADA Screening Guidelines:  5.7-6.4%  Consistent with prediabetes  >or=6.5%  Consistent with diabetes  High levels of fetal hemoglobin interfere with the HbA1C  assay. Heterozygous hemoglobin variants (HbS, HgC, etc)do  not significantly interfere with this assay.   However, presence of multiple variants may affect accuracy.     10/16/2019 8.2 (H) 4.0 - 5.6 % Final     Comment:     ADA Screening Guidelines:  5.7-6.4%  Consistent with prediabetes  >or=6.5%  Consistent with diabetes  High levels of fetal hemoglobin interfere with the HbA1C  assay. Heterozygous hemoglobin variants (HbS, HgC, etc)do  not significantly interfere with this assay.   However, presence of multiple variants may affect accuracy.         GOAL A1C: 7% or less without hypoglycemia.     DM MEDICATIONS USED IN THE PAST: Metformin, Januvia, Basaglar, Farxiga, Glyburide, Actos   Invokana- yeast infections, Tresiba, Ozempic   VGo  Novolog     CURRENT DIABETES MEDICATIONS:  Metformin 1000 mg BID, Farxiga 10 mg daily, Ozempic 1 mg weekly (Fridays), VGo40 with Novolog - 4-5 clicks with meals. --not changing his V Go every 24 hr  Insulin: VGo   Missed doses: was missing the Ozempic for a couple months. - forgot about it back on it now.   Sometimes wearing the VGo for 2-3 days. Not changing it every day.       BLOOD GLUCOSE MONITORING:   He has not been monitoring his BG consistently.       HYPOGLYCEMIA:  None       MEALS: eating 3 meals per day   BF: ~ haleigh's or par 3 -- breakfast special- eggs, grits, and sausage.   Lunch: soup and salad.   Dinner: grilled or baked fish, occ fried shrimp. Now grilled potatoes and grilled fish  - soup.   Snack: after dinner on chips OR crackers   Drinks: ~ stopped cokes.   Diet green tea or water.   Diet coke.        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      Review of Systems  Review of Systems   Constitutional: Negative for appetite change, fatigue and unexpected weight change.   HENT: Negative for trouble swallowing.    Eyes: Negative for visual disturbance.   Respiratory: Negative for shortness of breath.    Cardiovascular: Negative for chest pain.   Gastrointestinal: Negative for nausea.   Endocrine: Negative for polydipsia, polyphagia and polyuria.   Genitourinary:        + Nocturia - maybe 1 time per night    Skin: Negative for rash and wound.   Neurological: Negative for numbness.   Psychiatric/Behavioral: Positive for dysphoric mood.       Physical Exam   Physical Exam  Vitals signs and nursing note reviewed.   Constitutional:       Appearance: He is well-developed.      Comments: Obese male patient    HENT:      Head: Normocephalic and atraumatic.      Right Ear: External ear normal.      Left Ear: External ear normal.      Nose: Nose normal.   Neck:      Musculoskeletal: Normal range of motion and neck supple.      Thyroid: No thyromegaly.      Trachea: No tracheal deviation.   Cardiovascular:      Rate and  Rhythm: Normal rate and regular rhythm.      Heart sounds: No murmur.   Pulmonary:      Effort: Pulmonary effort is normal. No respiratory distress.      Breath sounds: Normal breath sounds.   Abdominal:      Palpations: Abdomen is soft.      Tenderness: There is no abdominal tenderness.      Hernia: No hernia is present.   Skin:     General: Skin is warm and dry.      Capillary Refill: Capillary refill takes less than 2 seconds.      Findings: No rash.      Comments: VGo sites with mild skin irritation. No lipo hypertropthy or atrophy   Neurological:      Mental Status: He is alert and oriented to person, place, and time.      Cranial Nerves: No cranial nerve deficit.   Psychiatric:         Mood and Affect: Mood is depressed. Affect is tearful.         Behavior: Behavior normal.         Judgment: Judgment normal.         FOOT EXAMINATION: Appropriate footwear.     Protective Sensation (w/ 10 gram monofilament):  Right: Intact  Left: Intact    Visual Inspection:  Normal -  Bilateral and Nails Intact - without Evidence of Foot Deformity- Bilateral    Pedal Pulses:   Right: Present  Left: Present    Posterior tibialis:   Right:Present  Left: Present          Lab Results   Component Value Date    TSH 1.99 01/08/2020         Type 2 diabetes mellitus with hyperglycemia, with long-term current use of insulin  Uncontrolled.   A1c has increased above goal due to noncompliance with regimen ( missing the Ozempic for months), wearing the VGo for more than 24 hours, dietary indiscretions due to family stressors, and not performing SBGM - being off the Dexcom.   He is interested in trying a different CGM if financially feasible.  Will try the Freestyle zachariah 2.   A sample sensor was placed on patient in clinic today.   He would like to explore the cost of the Omnipod and possible change over.         Medication changes:   Continue Metformin 1000 mg BID, Farxiga 10 mg daily, Ozempic 1 mg weekly   For now continue VGo40 with  Novolog 4-5 clicks with meals.   Will reevaluate insulin doses after compliance with VGO, changing every 24 hours and after freestyle zachariah download with DM education in 2 weeks.       If we move forward with Omnipod. He will have set doses.   Pump settings:    Basal: 1.3 units/hr     Meal doses -   Small meal- 7 units   Large meal - 10 units   Snack - 2 units     With using correction scale:  150-200: +1 unit  201-250: +2 units  251-300: +3 units  301-350: +4 units  >350: +5 units    IOB 3.5 hours     Target 100-120      -- Refer to diabetes education-  Freestyle zachariah download in 2 weeks. Possible Omnipod start - submitting the paperwork today   -- Reviewed goals of therapy are to get the best control we can without hypoglycemia  -- Reviewed patient's current insulin regimen. Clarified proper insulin dose and timing in relation to meals, etc. Insulin injection sites and proper rotation instructed.    -- Advised frequent self blood glucose monitoring.  Patient encouraged to document glucose results and bring them to every clinic visit -   Discussed the importance. He does better with a personal CGM. Freestyle zachariah 2 placed on patient in clinic today. Rx sent to pharmacy.   -- Hypoglycemia precautions discussed. Instructed on precautions before driving.    -- Call for Bg repeatedly < 90 or > 180.   -- Close adherence to lifestyle changes recommended.   -- Periodic follow ups for eye evaluations, foot care and dental care suggested.    -- external referral for eye exam          Essential hypertension  BP goal is < 140/90.   Tolerating ACEi  Controlled   Blood pressure goals discussed with patient      Mixed hyperlipidemia  On statin per ADA recommendations  LDL goal < 100. LDL at goal. LFTs WNL. Continue statin.   Due for lipids with next lab draw       Obesity (BMI 30-39.9)  Body mass index is 33.19 kg/m².  Increases insulin resistance.   Discussed DM diet and exercise.   Encourage  weight loss        Follow up in  about 3 months (around 1/12/2021).  Referral to diabetes education- freestyle zachariah download in 2 weeks  F/u with me in 3 months with labs prior       Orders Placed This Encounter   Procedures    Hemoglobin A1C     Standing Status:   Future     Standing Expiration Date:   4/12/2022    Comprehensive Metabolic Panel     Standing Status:   Future     Standing Expiration Date:   4/12/2022    Lipid Panel     Standing Status:   Future     Standing Expiration Date:   4/12/2022    Ambulatory referral/consult to Diabetes Education     Standing Status:   Future     Standing Expiration Date:   10/12/2021     Referral Priority:   Routine     Referral Type:   Consultation     Referral Reason:   Specialty Services Required     Referred to Provider:   Kathy Watkins RD, CDE     Requested Specialty:   Diabetes     Number of Visits Requested:   1    Ambulatory referral/consult to Optometry     Standing Status:   Future     Standing Expiration Date:   11/12/2021     Referral Priority:   Routine     Referral Type:   Vision (Optometry)     Referral Reason:   Specialty Services Required     Requested Specialty:   Optometry     Number of Visits Requested:   1       Recommendations were discussed with the patient in detail  The patient verbalized understanding and agrees with the plan outlined as above.

## 2020-10-13 RX ORDER — DAPAGLIFLOZIN 10 MG/1
10 TABLET, FILM COATED ORAL DAILY
Qty: 90 TABLET | Refills: 1 | Status: SHIPPED | OUTPATIENT
Start: 2020-10-13 | End: 2021-03-29 | Stop reason: SDUPTHER

## 2020-10-13 RX ORDER — SEMAGLUTIDE 1.34 MG/ML
INJECTION, SOLUTION SUBCUTANEOUS
Qty: 6 SYRINGE | Refills: 1 | Status: SHIPPED | OUTPATIENT
Start: 2020-10-13 | End: 2021-03-29 | Stop reason: SDUPTHER

## 2020-10-13 RX ORDER — METFORMIN HYDROCHLORIDE 1000 MG/1
1000 TABLET ORAL 2 TIMES DAILY WITH MEALS
Qty: 180 TABLET | Refills: 1 | Status: SHIPPED | OUTPATIENT
Start: 2020-10-13 | End: 2021-03-29 | Stop reason: SDUPTHER

## 2020-10-13 RX ORDER — ATORVASTATIN CALCIUM 20 MG/1
20 TABLET, FILM COATED ORAL NIGHTLY
Qty: 90 TABLET | Refills: 1 | Status: SHIPPED | OUTPATIENT
Start: 2020-10-13 | End: 2021-07-02

## 2020-10-13 RX ORDER — SUB-Q INSULIN DEVICE, 40 UNIT
1 EACH MISCELLANEOUS DAILY
Qty: 30 EACH | Refills: 6 | Status: SHIPPED | OUTPATIENT
Start: 2020-10-13 | End: 2021-03-29

## 2020-10-13 RX ORDER — INSULIN ASPART 100 [IU]/ML
INJECTION, SOLUTION INTRAVENOUS; SUBCUTANEOUS
Qty: 4 VIAL | Refills: 6 | Status: SHIPPED | OUTPATIENT
Start: 2020-10-13 | End: 2021-11-16

## 2020-10-13 NOTE — ASSESSMENT & PLAN NOTE
Body mass index is 33.19 kg/m².  Increases insulin resistance.   Discussed DM diet and exercise.   Encourage  weight loss

## 2020-10-13 NOTE — ASSESSMENT & PLAN NOTE
On statin per ADA recommendations  LDL goal < 100. LDL at goal. LFTs WNL. Continue statin.   Due for lipids with next lab draw

## 2020-10-13 NOTE — ASSESSMENT & PLAN NOTE
Uncontrolled.   A1c has increased above goal due to noncompliance with regimen ( missing the Ozempic for months), wearing the VGo for more than 24 hours, dietary indiscretions due to family stressors, and not performing SBGM - being off the Dexcom.   He is interested in trying a different CGM if financially feasible.  Will try the Freestyle zachariah 2.   A sample sensor was placed on patient in clinic today.   He would like to explore the cost of the Omnipod and possible change over.         Medication changes:   Continue Metformin 1000 mg BID, Farxiga 10 mg daily, Ozempic 1 mg weekly   For now continue VGo40 with Novolog 4-5 clicks with meals.   Will reevaluate insulin doses after compliance with VGO, changing every 24 hours and after freestyle zachariah download with DM education in 2 weeks.       If we move forward with Omnipod. He will have set doses.   Pump settings:    Basal: 1.3 units/hr     Meal doses -   Small meal- 7 units   Large meal - 10 units   Snack - 2 units     With using correction scale:  150-200: +1 unit  201-250: +2 units  251-300: +3 units  301-350: +4 units  >350: +5 units    IOB 3.5 hours     Target 100-120      -- Refer to diabetes education-  Freestyle zachariah download in 2 weeks. Possible Omnipod start - submitting the paperwork today   -- Reviewed goals of therapy are to get the best control we can without hypoglycemia  -- Reviewed patient's current insulin regimen. Clarified proper insulin dose and timing in relation to meals, etc. Insulin injection sites and proper rotation instructed.    -- Advised frequent self blood glucose monitoring.  Patient encouraged to document glucose results and bring them to every clinic visit -   Discussed the importance. He does better with a personal CGM. Freestyle zachariah 2 placed on patient in clinic today. Rx sent to pharmacy.   -- Hypoglycemia precautions discussed. Instructed on precautions before driving.    -- Call for Bg repeatedly < 90 or > 180.   -- Close  adherence to lifestyle changes recommended.   -- Periodic follow ups for eye evaluations, foot care and dental care suggested.    -- external referral for eye exam

## 2020-11-02 ENCOUNTER — CLINICAL SUPPORT (OUTPATIENT)
Dept: DIABETES | Facility: CLINIC | Age: 55
End: 2020-11-02
Payer: COMMERCIAL

## 2020-11-02 ENCOUNTER — TELEPHONE (OUTPATIENT)
Dept: DIABETES | Facility: CLINIC | Age: 55
End: 2020-11-02

## 2020-11-02 VITALS — WEIGHT: 238.13 LBS | BODY MASS INDEX: 33.34 KG/M2 | HEIGHT: 71 IN

## 2020-11-02 DIAGNOSIS — E11.65 TYPE 2 DIABETES MELLITUS WITH HYPERGLYCEMIA, WITH LONG-TERM CURRENT USE OF INSULIN: Chronic | ICD-10-CM

## 2020-11-02 DIAGNOSIS — Z79.4 TYPE 2 DIABETES MELLITUS WITH HYPERGLYCEMIA, WITH LONG-TERM CURRENT USE OF INSULIN: Chronic | ICD-10-CM

## 2020-11-02 PROCEDURE — 95251 PR GLUCOSE MONITOR, 72 HOUR, PHYS INTERP: ICD-10-PCS | Mod: S$GLB,,, | Performed by: NURSE PRACTITIONER

## 2020-11-02 PROCEDURE — G0108 DIAB MANAGE TRN  PER INDIV: HCPCS | Mod: S$GLB,,, | Performed by: DIETITIAN, REGISTERED

## 2020-11-02 PROCEDURE — 99999 PR PBB SHADOW E&M-EST. PATIENT-LVL III: CPT | Mod: PBBFAC,,, | Performed by: DIETITIAN, REGISTERED

## 2020-11-02 PROCEDURE — 95251 CONT GLUC MNTR ANALYSIS I&R: CPT | Mod: S$GLB,,, | Performed by: NURSE PRACTITIONER

## 2020-11-02 PROCEDURE — G0108 PR DIAB MANAGE TRN  PER INDIV: ICD-10-PCS | Mod: S$GLB,,, | Performed by: DIETITIAN, REGISTERED

## 2020-11-02 PROCEDURE — 99999 PR PBB SHADOW E&M-EST. PATIENT-LVL III: ICD-10-PCS | Mod: PBBFAC,,, | Performed by: DIETITIAN, REGISTERED

## 2020-11-02 NOTE — PROGRESS NOTES
Reviewed personal CGM results   Average BG was 152 - time in range was 83%.   Estimated A1c is 6.9%.   No hypoglycemia.   + prandial excursions 17% of the time.   Discussed results with patient.     + prandial excursions following dinner.   Increase dinner clicks to 5 clicks.    1 click for any after dinner snacking     He v/u all questions answered.

## 2020-11-02 NOTE — TELEPHONE ENCOUNTER
Reviewed personal CGM results   Average BG was 152 - time in range was 83%.   Estimated A1c is 6.9%.   No hypoglycemia.   + prandial excursions 17% of the time.   Discussed results with patient.

## 2020-11-02 NOTE — PROGRESS NOTES
Diabetes Education  Author: Kathy Watkins RD, CDE  Date: 11/2/2020    Diabetes Care Management Summary  Diabetes Education Record Assessment/Progress: Initial  Current Diabetes Risk Level: Moderate     Last A1c:   Lab Results   Component Value Date    HGBA1C 8.9 (H) 10/08/2020     Last visit with Diabetes Educator: Last Education Visit: Not Found      Diabetes Type  Diabetes Type : Type II    Diabetes History  Diabetes Diagnosis: >10 years  Current Treatment: Insulin, Injectable, Oral Medication(novolog via vgo40, farxiga, metformin, ozempic)  Reviewed Problem List with Patient: Yes    Health Maintenance was reviewed today with patient. Discussed with patient importance of routine eye exams, foot exams/foot care, blood work (i.e.: A1c, microalbumin, and lipid), dental visits, yearly flu vaccine, and pneumonia vaccine as indicated by PCP. Patient verbalized understanding.     Health Maintenance Topics with due status: Not Due       Topic Last Completion Date    TETANUS VACCINE 12/20/2013    Colorectal Cancer Screening 05/17/2018    Lipid Panel 01/08/2020    PROSTATE-SPECIFIC ANTIGEN 10/08/2020    Hemoglobin A1c 10/08/2020    Foot Exam 10/12/2020    Low Dose Statin 10/13/2020     Health Maintenance Due   Topic Date Due    Shingles Vaccine (1 of 2) 08/26/2013    Eye Exam  07/16/2020       Nutrition  Meal Planning: skipping meals, water, 3 meals per day, artificial sweeteners, eats out often, snacks between meal  What type of sweetener do you use?: Equal, Sweet N Low, Splenda  What type of beverages do you drink?: water, other (see comments)(crystal light)  Meal Plan 24 Hour Recall - Breakfast: Eggs  Meal Plan 24 Hour Recall - Lunch: Bell Pepper and Cabbage  Meal Plan 24 Hour Recall - Dinner: lasagna  Meal Plan 24 Hour Recall - Snack: mixed nuts    Monitoring   Self Monitoring : using CGM only checks when he feels differently than the sensor is indicating - not good about finger sticks - has been without a sensor for  the majority of 2020, currently wearing a freestyle maria de jesus 2, waiting to see if insurance will approve it  Blood Glucose Logs: No  Do you use a personal continuous glucose monitor?: Yes  What kind of glucose monitor do you use?: Freestyle Maria De Jesus Flash(has both the dexcom system and the freestyle maria de jesus 2 system - insurance stopped paying for the Dexcom now costing $300, maria de jesus is not covered and will cost $75)  In the last month, how often have you had a low blood sugar reaction?: never  What are your symptoms of low blood sugar?: shaky and weak when in the  range  How do you treat low blood sugar?: eat something  Can you tell when your blood sugar is too high?: sometimes  How do you treat high blood sugar?: insulin    Exercise   Exercise Type: none  Frequency: Never    Current Diabetes Treatment   Current Treatment: Insulin, Injectable, Oral Medication(novolog via vgo40, farxiga, metformin, ozempic)    Social History  Preferred Learning Method: Face to Face  Primary Support: Self, Spouse, Daughter, Family  Educational Level: High School  Occupation: manager  Smoking Status: Never a Smoker  Alcohol Use: Monthly            DDS-2 Score  ( > 3 = SIGNIFICANT DISTRESS): 1                   Barriers to Change  Barriers to Change: None  Learning Challenges : None              Diabetes Education Assessment/Progress  Diabetes Disease Process (diabetes disease process and treatment options): Comprehends Key Points, Individual Session, Discussion  Nutrition (Incorporating nutritional management into one's lifestyle): Comprehends Key Points, Individual Session, Discussion  Physical Activity (incorporating physical activity into one's lifestyle): Comprehends Key Points, Individual Session, Discussion  Medications (states correct name, dose, onset, peak, duration, side effects & timing of meds): Comprehends Key Points, Individual Session, Discussion, Demonstration, Instructed, Written Materials Provided, Demonstrates  Understanding/Competency(verbalizes/demonstrates), Return Demonstration  Monitoring (monitoring blood glucose/other parameters & using results): Comprehends Key Points, Individual Session, Discussion, Demonstration, Instructed, Written Materials Provided, Demonstrates Understanding/Competency (verbalizes/demonstrates), Return Demonstration  Acute Complications (preventing, detecting, and treating acute complications): Comprehends Key Points, Individual Session, Discussion  Chronic Complications (preventing, detecting, and treating chronic complications): Comprehends Key Points, Individual Session, Discussion  Clinical (diabetes, other pertinent medical history, and relevant comorbidities reviewed during visit): Comprehends Key Points, Individual Session, Discussion  Cognitive (knowledge of self-management skills, functional health literacy): Comprehends Key Points, Individual Session, Discussion  Psychosocial (emotional response to diabetes): Comprehends Key Points, Individual Session, Discussion  Diabetes Distress and Support Systems: Comprehends Key Points, Individual Session, Instructed, Discussion, Written Materials Provided  Behavioral (readiness for change, lifestyle practices, self-care behaviors): Comprehends Key Points, Individual Session, Discussion    Goals  Patient has selected/evaluated goals during today's session: Yes, evaluated  Healthy Eating: In Progress         Diabetes Care Plan/Intervention  Education Plan/Intervention: Individual Follow-Up DSMT    Diabetes Meal Plan  Restrictions: Low Fat, Low Sodium, Restricted Carbohydrate  Calories: 1800  Carbohydrate Per Meal: 30-45g  Carbohydrate Per Snack : 15-20g  Fat: 50  Protein: 135    Today's Self-Management Care Plan was developed with the patient's input and is based on barriers identified during today's assessment.    The long and short-term goals in the care plan were written with the patient/caregiver's input. The patient has agreed to work  toward these goals to improve his overall diabetes control.      The patient received a copy of today's self-management plan and verbalized understanding of the care plan, goals, and all of today's instructions.      The patient was encouraged to communicate with his physician and care team regarding his condition(s) and treatment.  I provided the patient with my contact information today and encouraged him to contact me via phone or patient portal as needed.     Education Units of Time   Time Spent: 60 min

## 2020-12-28 ENCOUNTER — PATIENT OUTREACH (OUTPATIENT)
Dept: ADMINISTRATIVE | Facility: OTHER | Age: 55
End: 2020-12-28

## 2020-12-28 NOTE — PROGRESS NOTES
LINKS immunization registry not responding  Care Everywhere updated  Health Maintenance updated  Chart reviewed for overdue Proactive [facility] En[facility]ters ([facility]) health maintenance testing (CRS, Breast Ca, Diabetic Eye Exam)   Orders entered:N/A

## 2021-01-04 ENCOUNTER — PATIENT MESSAGE (OUTPATIENT)
Dept: ADMINISTRATIVE | Facility: HOSPITAL | Age: 56
End: 2021-01-04

## 2021-01-14 ENCOUNTER — TELEPHONE (OUTPATIENT)
Dept: DIABETES | Facility: CLINIC | Age: 56
End: 2021-01-14

## 2021-01-15 ENCOUNTER — TELEPHONE (OUTPATIENT)
Dept: DIABETES | Facility: CLINIC | Age: 56
End: 2021-01-15

## 2021-01-20 ENCOUNTER — TELEPHONE (OUTPATIENT)
Dept: PRIMARY CARE CLINIC | Facility: CLINIC | Age: 56
End: 2021-01-20

## 2021-01-21 ENCOUNTER — INFUSION (OUTPATIENT)
Dept: INFECTIOUS DISEASES | Facility: HOSPITAL | Age: 56
End: 2021-01-21
Attending: FAMILY MEDICINE
Payer: COMMERCIAL

## 2021-01-21 VITALS
WEIGHT: 245 LBS | HEIGHT: 72 IN | HEART RATE: 69 BPM | BODY MASS INDEX: 33.18 KG/M2 | RESPIRATION RATE: 18 BRPM | DIASTOLIC BLOOD PRESSURE: 64 MMHG | OXYGEN SATURATION: 96 % | SYSTOLIC BLOOD PRESSURE: 108 MMHG | TEMPERATURE: 99 F

## 2021-01-21 DIAGNOSIS — U07.1 LAB TEST POSITIVE FOR DETECTION OF COVID-19 VIRUS: ICD-10-CM

## 2021-01-21 DIAGNOSIS — U07.1 COVID-19: Primary | ICD-10-CM

## 2021-01-21 DIAGNOSIS — U07.1 LAB TEST POSITIVE FOR DETECTION OF COVID-19 VIRUS: Primary | ICD-10-CM

## 2021-01-21 PROCEDURE — 25000003 PHARM REV CODE 250

## 2021-01-21 PROCEDURE — M0239 BAMLANIVIMAB-XXXX INFUSION: HCPCS

## 2021-01-21 PROCEDURE — 63600175 PHARM REV CODE 636 W HCPCS

## 2021-01-21 RX ORDER — SODIUM CHLORIDE 0.9 % (FLUSH) 0.9 %
10 SYRINGE (ML) INJECTION
Status: DISCONTINUED | OUTPATIENT
Start: 2021-01-21 | End: 2021-07-26

## 2021-01-21 RX ORDER — EPINEPHRINE 0.1 MG/ML
0.3 INJECTION INTRAVENOUS
Status: DISCONTINUED | OUTPATIENT
Start: 2021-01-21 | End: 2021-07-26

## 2021-01-21 RX ORDER — DIPHENHYDRAMINE HYDROCHLORIDE 50 MG/ML
25 INJECTION INTRAMUSCULAR; INTRAVENOUS ONCE AS NEEDED
Status: DISCONTINUED | OUTPATIENT
Start: 2021-01-21 | End: 2021-07-26

## 2021-01-21 RX ORDER — ALBUTEROL SULFATE 90 UG/1
2 AEROSOL, METERED RESPIRATORY (INHALATION)
Status: DISCONTINUED | OUTPATIENT
Start: 2021-01-21 | End: 2021-07-26

## 2021-01-21 RX ORDER — ONDANSETRON 4 MG/1
4 TABLET, ORALLY DISINTEGRATING ORAL ONCE AS NEEDED
Status: DISCONTINUED | OUTPATIENT
Start: 2021-01-21 | End: 2021-07-26

## 2021-01-21 RX ORDER — ACETAMINOPHEN 325 MG/1
650 TABLET ORAL ONCE AS NEEDED
Status: DISCONTINUED | OUTPATIENT
Start: 2021-01-21 | End: 2021-07-26

## 2021-01-21 RX ADMIN — SODIUM CHLORIDE 700 MG: 0.9 INJECTION, SOLUTION INTRAVENOUS at 12:01

## 2021-01-22 ENCOUNTER — TELEPHONE (OUTPATIENT)
Dept: ADMINISTRATIVE | Facility: CLINIC | Age: 56
End: 2021-01-22

## 2021-01-22 ENCOUNTER — NURSE TRIAGE (OUTPATIENT)
Dept: ADMINISTRATIVE | Facility: CLINIC | Age: 56
End: 2021-01-22

## 2021-01-22 ENCOUNTER — PATIENT MESSAGE (OUTPATIENT)
Dept: ADMINISTRATIVE | Facility: OTHER | Age: 56
End: 2021-01-22

## 2021-01-23 ENCOUNTER — PATIENT MESSAGE (OUTPATIENT)
Dept: ADMINISTRATIVE | Facility: OTHER | Age: 56
End: 2021-01-23

## 2021-01-24 ENCOUNTER — PATIENT MESSAGE (OUTPATIENT)
Dept: ADMINISTRATIVE | Facility: OTHER | Age: 56
End: 2021-01-24

## 2021-01-25 ENCOUNTER — PATIENT MESSAGE (OUTPATIENT)
Dept: ADMINISTRATIVE | Facility: CLINIC | Age: 56
End: 2021-01-25

## 2021-01-25 ENCOUNTER — PATIENT MESSAGE (OUTPATIENT)
Dept: ADMINISTRATIVE | Facility: OTHER | Age: 56
End: 2021-01-25

## 2021-01-26 ENCOUNTER — PATIENT MESSAGE (OUTPATIENT)
Dept: ADMINISTRATIVE | Facility: OTHER | Age: 56
End: 2021-01-26

## 2021-01-26 ENCOUNTER — PATIENT MESSAGE (OUTPATIENT)
Dept: ADMINISTRATIVE | Facility: CLINIC | Age: 56
End: 2021-01-26

## 2021-01-27 ENCOUNTER — PATIENT MESSAGE (OUTPATIENT)
Dept: ADMINISTRATIVE | Facility: OTHER | Age: 56
End: 2021-01-27

## 2021-01-27 ENCOUNTER — PATIENT MESSAGE (OUTPATIENT)
Dept: ADMINISTRATIVE | Facility: CLINIC | Age: 56
End: 2021-01-27

## 2021-01-28 ENCOUNTER — PATIENT MESSAGE (OUTPATIENT)
Dept: ADMINISTRATIVE | Facility: CLINIC | Age: 56
End: 2021-01-28

## 2021-01-28 ENCOUNTER — PATIENT MESSAGE (OUTPATIENT)
Dept: ADMINISTRATIVE | Facility: OTHER | Age: 56
End: 2021-01-28

## 2021-01-29 ENCOUNTER — PATIENT MESSAGE (OUTPATIENT)
Dept: ADMINISTRATIVE | Facility: OTHER | Age: 56
End: 2021-01-29

## 2021-01-29 ENCOUNTER — PATIENT MESSAGE (OUTPATIENT)
Dept: ADMINISTRATIVE | Facility: CLINIC | Age: 56
End: 2021-01-29

## 2021-01-30 ENCOUNTER — PATIENT MESSAGE (OUTPATIENT)
Dept: ADMINISTRATIVE | Facility: OTHER | Age: 56
End: 2021-01-30

## 2021-01-30 ENCOUNTER — PATIENT MESSAGE (OUTPATIENT)
Dept: ADMINISTRATIVE | Facility: CLINIC | Age: 56
End: 2021-01-30

## 2021-01-31 ENCOUNTER — PATIENT OUTREACH (OUTPATIENT)
Dept: ADMINISTRATIVE | Facility: OTHER | Age: 56
End: 2021-01-31

## 2021-01-31 ENCOUNTER — PATIENT MESSAGE (OUTPATIENT)
Dept: ADMINISTRATIVE | Facility: OTHER | Age: 56
End: 2021-01-31

## 2021-01-31 ENCOUNTER — PATIENT MESSAGE (OUTPATIENT)
Dept: ADMINISTRATIVE | Facility: CLINIC | Age: 56
End: 2021-01-31

## 2021-02-01 ENCOUNTER — PATIENT MESSAGE (OUTPATIENT)
Dept: ADMINISTRATIVE | Facility: OTHER | Age: 56
End: 2021-02-01

## 2021-02-02 ENCOUNTER — PATIENT MESSAGE (OUTPATIENT)
Dept: ADMINISTRATIVE | Facility: CLINIC | Age: 56
End: 2021-02-02

## 2021-02-02 ENCOUNTER — NURSE TRIAGE (OUTPATIENT)
Dept: ADMINISTRATIVE | Facility: CLINIC | Age: 56
End: 2021-02-02

## 2021-02-02 ENCOUNTER — PATIENT MESSAGE (OUTPATIENT)
Dept: ADMINISTRATIVE | Facility: OTHER | Age: 56
End: 2021-02-02

## 2021-02-03 ENCOUNTER — PATIENT MESSAGE (OUTPATIENT)
Dept: ADMINISTRATIVE | Facility: OTHER | Age: 56
End: 2021-02-03

## 2021-02-03 ENCOUNTER — PATIENT MESSAGE (OUTPATIENT)
Dept: ADMINISTRATIVE | Facility: CLINIC | Age: 56
End: 2021-02-03

## 2021-02-04 ENCOUNTER — PATIENT MESSAGE (OUTPATIENT)
Dept: ADMINISTRATIVE | Facility: OTHER | Age: 56
End: 2021-02-04

## 2021-03-26 ENCOUNTER — TELEPHONE (OUTPATIENT)
Dept: DIABETES | Facility: CLINIC | Age: 56
End: 2021-03-26

## 2021-03-29 ENCOUNTER — OFFICE VISIT (OUTPATIENT)
Dept: DIABETES | Facility: CLINIC | Age: 56
End: 2021-03-29
Payer: COMMERCIAL

## 2021-03-29 VITALS
OXYGEN SATURATION: 96 % | HEIGHT: 72 IN | SYSTOLIC BLOOD PRESSURE: 110 MMHG | DIASTOLIC BLOOD PRESSURE: 68 MMHG | BODY MASS INDEX: 32.28 KG/M2 | HEART RATE: 91 BPM | WEIGHT: 238.31 LBS

## 2021-03-29 DIAGNOSIS — Z71.9 HEALTH EDUCATION/COUNSELING: ICD-10-CM

## 2021-03-29 DIAGNOSIS — E66.9 OBESITY (BMI 30-39.9): ICD-10-CM

## 2021-03-29 DIAGNOSIS — I10 ESSENTIAL HYPERTENSION: ICD-10-CM

## 2021-03-29 DIAGNOSIS — E11.69 DM TYPE 2 WITH DIABETIC MIXED HYPERLIPIDEMIA: ICD-10-CM

## 2021-03-29 DIAGNOSIS — Z79.4 TYPE 2 DIABETES MELLITUS WITH HYPERGLYCEMIA, WITH LONG-TERM CURRENT USE OF INSULIN: Primary | Chronic | ICD-10-CM

## 2021-03-29 DIAGNOSIS — E11.65 TYPE 2 DIABETES MELLITUS WITH HYPERGLYCEMIA, WITH LONG-TERM CURRENT USE OF INSULIN: Primary | Chronic | ICD-10-CM

## 2021-03-29 DIAGNOSIS — E78.2 MIXED HYPERLIPIDEMIA: ICD-10-CM

## 2021-03-29 DIAGNOSIS — E78.2 DM TYPE 2 WITH DIABETIC MIXED HYPERLIPIDEMIA: ICD-10-CM

## 2021-03-29 LAB — GLUCOSE SERPL-MCNC: 183 MG/DL (ref 70–110)

## 2021-03-29 PROCEDURE — 99999 PR PBB SHADOW E&M-EST. PATIENT-LVL IV: CPT | Mod: PBBFAC,,, | Performed by: NURSE PRACTITIONER

## 2021-03-29 PROCEDURE — 3074F PR MOST RECENT SYSTOLIC BLOOD PRESSURE < 130 MM HG: ICD-10-PCS | Mod: CPTII,S$GLB,, | Performed by: NURSE PRACTITIONER

## 2021-03-29 PROCEDURE — 3052F HG A1C>EQUAL 8.0%<EQUAL 9.0%: CPT | Mod: CPTII,S$GLB,, | Performed by: NURSE PRACTITIONER

## 2021-03-29 PROCEDURE — 3078F DIAST BP <80 MM HG: CPT | Mod: CPTII,S$GLB,, | Performed by: NURSE PRACTITIONER

## 2021-03-29 PROCEDURE — 99214 PR OFFICE/OUTPT VISIT, EST, LEVL IV, 30-39 MIN: ICD-10-PCS | Mod: S$GLB,,, | Performed by: NURSE PRACTITIONER

## 2021-03-29 PROCEDURE — 3074F SYST BP LT 130 MM HG: CPT | Mod: CPTII,S$GLB,, | Performed by: NURSE PRACTITIONER

## 2021-03-29 PROCEDURE — 82962 GLUCOSE BLOOD TEST: CPT | Mod: S$GLB,,, | Performed by: NURSE PRACTITIONER

## 2021-03-29 PROCEDURE — 1126F PR PAIN SEVERITY QUANTIFIED, NO PAIN PRESENT: ICD-10-PCS | Mod: S$GLB,,, | Performed by: NURSE PRACTITIONER

## 2021-03-29 PROCEDURE — 3078F PR MOST RECENT DIASTOLIC BLOOD PRESSURE < 80 MM HG: ICD-10-PCS | Mod: CPTII,S$GLB,, | Performed by: NURSE PRACTITIONER

## 2021-03-29 PROCEDURE — 82962 POCT GLUCOSE, HAND-HELD DEVICE: ICD-10-PCS | Mod: S$GLB,,, | Performed by: NURSE PRACTITIONER

## 2021-03-29 PROCEDURE — 99214 OFFICE O/P EST MOD 30 MIN: CPT | Mod: S$GLB,,, | Performed by: NURSE PRACTITIONER

## 2021-03-29 PROCEDURE — 1126F AMNT PAIN NOTED NONE PRSNT: CPT | Mod: S$GLB,,, | Performed by: NURSE PRACTITIONER

## 2021-03-29 PROCEDURE — 99999 PR PBB SHADOW E&M-EST. PATIENT-LVL IV: ICD-10-PCS | Mod: PBBFAC,,, | Performed by: NURSE PRACTITIONER

## 2021-03-29 PROCEDURE — 3008F BODY MASS INDEX DOCD: CPT | Mod: CPTII,S$GLB,, | Performed by: NURSE PRACTITIONER

## 2021-03-29 PROCEDURE — 3008F PR BODY MASS INDEX (BMI) DOCUMENTED: ICD-10-PCS | Mod: CPTII,S$GLB,, | Performed by: NURSE PRACTITIONER

## 2021-03-29 PROCEDURE — 3052F PR MOST RECENT HEMOGLOBIN A1C LEVEL 8.0 - < 9.0%: ICD-10-PCS | Mod: CPTII,S$GLB,, | Performed by: NURSE PRACTITIONER

## 2021-03-29 RX ORDER — INSULIN DEGLUDEC 200 U/ML
42 INJECTION, SOLUTION SUBCUTANEOUS DAILY
Qty: 3 SYRINGE | Refills: 6 | Status: SHIPPED | OUTPATIENT
Start: 2021-03-29 | End: 2022-02-28 | Stop reason: SDUPTHER

## 2021-03-29 RX ORDER — METFORMIN HYDROCHLORIDE 1000 MG/1
1000 TABLET ORAL 2 TIMES DAILY WITH MEALS
Qty: 180 TABLET | Refills: 1 | Status: SHIPPED | OUTPATIENT
Start: 2021-03-29 | End: 2021-11-08 | Stop reason: SDUPTHER

## 2021-03-29 RX ORDER — SYRINGE AND NEEDLE,INSULIN,1ML 31GX15/64"
SYRINGE, EMPTY DISPOSABLE MISCELLANEOUS
Qty: 150 SYRINGE | Refills: 11 | Status: SHIPPED | OUTPATIENT
Start: 2021-03-29 | End: 2022-06-21

## 2021-03-29 RX ORDER — SEMAGLUTIDE 1.34 MG/ML
INJECTION, SOLUTION SUBCUTANEOUS
Qty: 6 SYRINGE | Refills: 1 | Status: SHIPPED | OUTPATIENT
Start: 2021-03-29 | End: 2022-06-21

## 2021-03-29 RX ORDER — DAPAGLIFLOZIN 10 MG/1
10 TABLET, FILM COATED ORAL DAILY
Qty: 90 TABLET | Refills: 1 | Status: SHIPPED | OUTPATIENT
Start: 2021-03-29 | End: 2021-09-27

## 2021-03-29 RX ORDER — PEN NEEDLE, DIABETIC 30 GX3/16"
NEEDLE, DISPOSABLE MISCELLANEOUS
Qty: 150 EACH | Refills: 11 | Status: SHIPPED | OUTPATIENT
Start: 2021-03-29 | End: 2022-06-21 | Stop reason: SDUPTHER

## 2021-04-29 ENCOUNTER — IMMUNIZATION (OUTPATIENT)
Dept: PRIMARY CARE CLINIC | Facility: CLINIC | Age: 56
End: 2021-04-29
Payer: COMMERCIAL

## 2021-04-29 DIAGNOSIS — Z23 NEED FOR VACCINATION: Primary | ICD-10-CM

## 2021-04-29 PROCEDURE — 91300 COVID-19, MRNA, LNP-S, PF, 30 MCG/0.3 ML DOSE VACCINE: CPT | Mod: S$GLB,,, | Performed by: INTERNAL MEDICINE

## 2021-04-29 PROCEDURE — 0001A COVID-19, MRNA, LNP-S, PF, 30 MCG/0.3 ML DOSE VACCINE: ICD-10-PCS | Mod: CV19,S$GLB,, | Performed by: INTERNAL MEDICINE

## 2021-04-29 PROCEDURE — 91300 COVID-19, MRNA, LNP-S, PF, 30 MCG/0.3 ML DOSE VACCINE: ICD-10-PCS | Mod: S$GLB,,, | Performed by: INTERNAL MEDICINE

## 2021-04-29 PROCEDURE — 0001A COVID-19, MRNA, LNP-S, PF, 30 MCG/0.3 ML DOSE VACCINE: CPT | Mod: CV19,S$GLB,, | Performed by: INTERNAL MEDICINE

## 2021-05-06 ENCOUNTER — TELEPHONE (OUTPATIENT)
Dept: DIABETES | Facility: CLINIC | Age: 56
End: 2021-05-06

## 2021-05-27 ENCOUNTER — IMMUNIZATION (OUTPATIENT)
Dept: PRIMARY CARE CLINIC | Facility: CLINIC | Age: 56
End: 2021-05-27
Payer: COMMERCIAL

## 2021-05-27 DIAGNOSIS — Z23 NEED FOR VACCINATION: Primary | ICD-10-CM

## 2021-05-27 PROCEDURE — 0002A COVID-19, MRNA, LNP-S, PF, 30 MCG/0.3 ML DOSE VACCINE: CPT | Mod: PBBFAC | Performed by: FAMILY MEDICINE

## 2021-05-27 PROCEDURE — 91300 COVID-19, MRNA, LNP-S, PF, 30 MCG/0.3 ML DOSE VACCINE: CPT | Mod: PBBFAC | Performed by: FAMILY MEDICINE

## 2021-06-11 RX ORDER — ESCITALOPRAM OXALATE 10 MG/1
TABLET ORAL
Qty: 90 TABLET | Refills: 0 | Status: SHIPPED | OUTPATIENT
Start: 2021-06-11 | End: 2021-11-08 | Stop reason: SDUPTHER

## 2021-07-06 ENCOUNTER — PATIENT MESSAGE (OUTPATIENT)
Dept: ADMINISTRATIVE | Facility: HOSPITAL | Age: 56
End: 2021-07-06

## 2021-07-26 ENCOUNTER — OFFICE VISIT (OUTPATIENT)
Dept: PRIMARY CARE CLINIC | Facility: CLINIC | Age: 56
End: 2021-07-26
Payer: COMMERCIAL

## 2021-07-26 VITALS
DIASTOLIC BLOOD PRESSURE: 60 MMHG | WEIGHT: 239.44 LBS | HEIGHT: 72 IN | HEART RATE: 78 BPM | RESPIRATION RATE: 18 BRPM | OXYGEN SATURATION: 97 % | BODY MASS INDEX: 32.43 KG/M2 | SYSTOLIC BLOOD PRESSURE: 102 MMHG

## 2021-07-26 DIAGNOSIS — E78.2 DM TYPE 2 WITH DIABETIC MIXED HYPERLIPIDEMIA: ICD-10-CM

## 2021-07-26 DIAGNOSIS — M50.30 DDD (DEGENERATIVE DISC DISEASE), CERVICAL: ICD-10-CM

## 2021-07-26 DIAGNOSIS — S30.0XXA CONTUSION OF COCCYX, INITIAL ENCOUNTER: Primary | ICD-10-CM

## 2021-07-26 DIAGNOSIS — E11.69 DM TYPE 2 WITH DIABETIC MIXED HYPERLIPIDEMIA: ICD-10-CM

## 2021-07-26 DIAGNOSIS — M54.12 CERVICAL RADICULOPATHY: ICD-10-CM

## 2021-07-26 PROCEDURE — 99214 PR OFFICE/OUTPT VISIT, EST, LEVL IV, 30-39 MIN: ICD-10-PCS | Mod: S$GLB,,, | Performed by: FAMILY MEDICINE

## 2021-07-26 PROCEDURE — 1160F RVW MEDS BY RX/DR IN RCRD: CPT | Mod: CPTII,S$GLB,, | Performed by: FAMILY MEDICINE

## 2021-07-26 PROCEDURE — 3051F HG A1C>EQUAL 7.0%<8.0%: CPT | Mod: CPTII,S$GLB,, | Performed by: FAMILY MEDICINE

## 2021-07-26 PROCEDURE — 1160F PR REVIEW ALL MEDS BY PRESCRIBER/CLIN PHARMACIST DOCUMENTED: ICD-10-PCS | Mod: CPTII,S$GLB,, | Performed by: FAMILY MEDICINE

## 2021-07-26 PROCEDURE — 1159F MED LIST DOCD IN RCRD: CPT | Mod: CPTII,S$GLB,, | Performed by: FAMILY MEDICINE

## 2021-07-26 PROCEDURE — 3078F PR MOST RECENT DIASTOLIC BLOOD PRESSURE < 80 MM HG: ICD-10-PCS | Mod: CPTII,S$GLB,, | Performed by: FAMILY MEDICINE

## 2021-07-26 PROCEDURE — 3008F PR BODY MASS INDEX (BMI) DOCUMENTED: ICD-10-PCS | Mod: CPTII,S$GLB,, | Performed by: FAMILY MEDICINE

## 2021-07-26 PROCEDURE — 3078F DIAST BP <80 MM HG: CPT | Mod: CPTII,S$GLB,, | Performed by: FAMILY MEDICINE

## 2021-07-26 PROCEDURE — 1125F AMNT PAIN NOTED PAIN PRSNT: CPT | Mod: CPTII,S$GLB,, | Performed by: FAMILY MEDICINE

## 2021-07-26 PROCEDURE — 99214 OFFICE O/P EST MOD 30 MIN: CPT | Mod: S$GLB,,, | Performed by: FAMILY MEDICINE

## 2021-07-26 PROCEDURE — 99999 PR PBB SHADOW E&M-EST. PATIENT-LVL V: ICD-10-PCS | Mod: PBBFAC,,, | Performed by: FAMILY MEDICINE

## 2021-07-26 PROCEDURE — 3074F PR MOST RECENT SYSTOLIC BLOOD PRESSURE < 130 MM HG: ICD-10-PCS | Mod: CPTII,S$GLB,, | Performed by: FAMILY MEDICINE

## 2021-07-26 PROCEDURE — 99999 PR PBB SHADOW E&M-EST. PATIENT-LVL V: CPT | Mod: PBBFAC,,, | Performed by: FAMILY MEDICINE

## 2021-07-26 PROCEDURE — 3051F PR MOST RECENT HEMOGLOBIN A1C LEVEL 7.0 - < 8.0%: ICD-10-PCS | Mod: CPTII,S$GLB,, | Performed by: FAMILY MEDICINE

## 2021-07-26 PROCEDURE — 1125F PR PAIN SEVERITY QUANTIFIED, PAIN PRESENT: ICD-10-PCS | Mod: CPTII,S$GLB,, | Performed by: FAMILY MEDICINE

## 2021-07-26 PROCEDURE — 3008F BODY MASS INDEX DOCD: CPT | Mod: CPTII,S$GLB,, | Performed by: FAMILY MEDICINE

## 2021-07-26 PROCEDURE — 1159F PR MEDICATION LIST DOCUMENTED IN MEDICAL RECORD: ICD-10-PCS | Mod: CPTII,S$GLB,, | Performed by: FAMILY MEDICINE

## 2021-07-26 PROCEDURE — 3074F SYST BP LT 130 MM HG: CPT | Mod: CPTII,S$GLB,, | Performed by: FAMILY MEDICINE

## 2021-07-26 RX ORDER — DICLOFENAC SODIUM 75 MG/1
75 TABLET, DELAYED RELEASE ORAL 2 TIMES DAILY PRN
Qty: 30 TABLET | Refills: 1 | Status: SHIPPED | OUTPATIENT
Start: 2021-07-26 | End: 2021-11-01

## 2021-08-24 ENCOUNTER — PATIENT OUTREACH (OUTPATIENT)
Dept: ADMINISTRATIVE | Facility: HOSPITAL | Age: 56
End: 2021-08-24

## 2021-10-05 ENCOUNTER — PATIENT MESSAGE (OUTPATIENT)
Dept: ADMINISTRATIVE | Facility: HOSPITAL | Age: 56
End: 2021-10-05

## 2021-11-02 ENCOUNTER — OFFICE VISIT (OUTPATIENT)
Dept: PRIMARY CARE CLINIC | Facility: CLINIC | Age: 56
End: 2021-11-02
Payer: COMMERCIAL

## 2021-11-02 VITALS
HEART RATE: 74 BPM | WEIGHT: 239.31 LBS | DIASTOLIC BLOOD PRESSURE: 60 MMHG | OXYGEN SATURATION: 97 % | BODY MASS INDEX: 32.41 KG/M2 | SYSTOLIC BLOOD PRESSURE: 122 MMHG | HEIGHT: 72 IN

## 2021-11-02 DIAGNOSIS — G54.2 CERVICAL NERVE ROOT IMPINGEMENT: Primary | ICD-10-CM

## 2021-11-02 DIAGNOSIS — M21.949 DEFORMITY OF HAND, UNSPECIFIED LATERALITY: ICD-10-CM

## 2021-11-02 DIAGNOSIS — S30.0XXA CONTUSION OF COCCYX, INITIAL ENCOUNTER: ICD-10-CM

## 2021-11-02 PROCEDURE — 3051F HG A1C>EQUAL 7.0%<8.0%: CPT | Mod: CPTII,S$GLB,, | Performed by: FAMILY MEDICINE

## 2021-11-02 PROCEDURE — 1160F RVW MEDS BY RX/DR IN RCRD: CPT | Mod: CPTII,S$GLB,, | Performed by: FAMILY MEDICINE

## 2021-11-02 PROCEDURE — 99214 PR OFFICE/OUTPT VISIT, EST, LEVL IV, 30-39 MIN: ICD-10-PCS | Mod: 25,S$GLB,, | Performed by: FAMILY MEDICINE

## 2021-11-02 PROCEDURE — 99214 OFFICE O/P EST MOD 30 MIN: CPT | Mod: 25,S$GLB,, | Performed by: FAMILY MEDICINE

## 2021-11-02 PROCEDURE — 3074F PR MOST RECENT SYSTOLIC BLOOD PRESSURE < 130 MM HG: ICD-10-PCS | Mod: CPTII,S$GLB,, | Performed by: FAMILY MEDICINE

## 2021-11-02 PROCEDURE — 1160F PR REVIEW ALL MEDS BY PRESCRIBER/CLIN PHARMACIST DOCUMENTED: ICD-10-PCS | Mod: CPTII,S$GLB,, | Performed by: FAMILY MEDICINE

## 2021-11-02 PROCEDURE — 3008F BODY MASS INDEX DOCD: CPT | Mod: CPTII,S$GLB,, | Performed by: FAMILY MEDICINE

## 2021-11-02 PROCEDURE — 90471 IMMUNIZATION ADMIN: CPT | Mod: S$GLB,,, | Performed by: FAMILY MEDICINE

## 2021-11-02 PROCEDURE — 1159F PR MEDICATION LIST DOCUMENTED IN MEDICAL RECORD: ICD-10-PCS | Mod: CPTII,S$GLB,, | Performed by: FAMILY MEDICINE

## 2021-11-02 PROCEDURE — 3051F PR MOST RECENT HEMOGLOBIN A1C LEVEL 7.0 - < 8.0%: ICD-10-PCS | Mod: CPTII,S$GLB,, | Performed by: FAMILY MEDICINE

## 2021-11-02 PROCEDURE — 3078F DIAST BP <80 MM HG: CPT | Mod: CPTII,S$GLB,, | Performed by: FAMILY MEDICINE

## 2021-11-02 PROCEDURE — 1159F MED LIST DOCD IN RCRD: CPT | Mod: CPTII,S$GLB,, | Performed by: FAMILY MEDICINE

## 2021-11-02 PROCEDURE — 4010F PR ACE/ARB THEARPY RXD/TAKEN: ICD-10-PCS | Mod: CPTII,S$GLB,, | Performed by: FAMILY MEDICINE

## 2021-11-02 PROCEDURE — 90471 FLU VACCINE (QUAD) GREATER THAN OR EQUAL TO 3YO PRESERVATIVE FREE IM: ICD-10-PCS | Mod: S$GLB,,, | Performed by: FAMILY MEDICINE

## 2021-11-02 PROCEDURE — 4010F ACE/ARB THERAPY RXD/TAKEN: CPT | Mod: CPTII,S$GLB,, | Performed by: FAMILY MEDICINE

## 2021-11-02 PROCEDURE — 3008F PR BODY MASS INDEX (BMI) DOCUMENTED: ICD-10-PCS | Mod: CPTII,S$GLB,, | Performed by: FAMILY MEDICINE

## 2021-11-02 PROCEDURE — 3074F SYST BP LT 130 MM HG: CPT | Mod: CPTII,S$GLB,, | Performed by: FAMILY MEDICINE

## 2021-11-02 PROCEDURE — 90686 IIV4 VACC NO PRSV 0.5 ML IM: CPT | Mod: S$GLB,,, | Performed by: FAMILY MEDICINE

## 2021-11-02 PROCEDURE — 99999 PR PBB SHADOW E&M-EST. PATIENT-LVL V: CPT | Mod: PBBFAC,,, | Performed by: FAMILY MEDICINE

## 2021-11-02 PROCEDURE — 90686 FLU VACCINE (QUAD) GREATER THAN OR EQUAL TO 3YO PRESERVATIVE FREE IM: ICD-10-PCS | Mod: S$GLB,,, | Performed by: FAMILY MEDICINE

## 2021-11-02 PROCEDURE — 99999 PR PBB SHADOW E&M-EST. PATIENT-LVL V: ICD-10-PCS | Mod: PBBFAC,,, | Performed by: FAMILY MEDICINE

## 2021-11-02 PROCEDURE — 3078F PR MOST RECENT DIASTOLIC BLOOD PRESSURE < 80 MM HG: ICD-10-PCS | Mod: CPTII,S$GLB,, | Performed by: FAMILY MEDICINE

## 2021-11-02 RX ORDER — DICLOFENAC SODIUM 75 MG/1
75 TABLET, DELAYED RELEASE ORAL 2 TIMES DAILY PRN
Qty: 30 TABLET | Refills: 1 | Status: SHIPPED | OUTPATIENT
Start: 2021-11-02

## 2021-11-08 DIAGNOSIS — E11.65 TYPE 2 DIABETES MELLITUS WITH HYPERGLYCEMIA, WITH LONG-TERM CURRENT USE OF INSULIN: Chronic | ICD-10-CM

## 2021-11-08 DIAGNOSIS — Z79.4 TYPE 2 DIABETES MELLITUS WITH HYPERGLYCEMIA, WITH LONG-TERM CURRENT USE OF INSULIN: Chronic | ICD-10-CM

## 2021-11-09 RX ORDER — METFORMIN HYDROCHLORIDE 1000 MG/1
1000 TABLET ORAL 2 TIMES DAILY WITH MEALS
Qty: 180 TABLET | Refills: 0 | Status: SHIPPED | OUTPATIENT
Start: 2021-11-09 | End: 2022-01-31

## 2021-11-09 RX ORDER — ESCITALOPRAM OXALATE 10 MG/1
10 TABLET ORAL DAILY
Qty: 90 TABLET | Refills: 3 | Status: SHIPPED | OUTPATIENT
Start: 2021-11-09 | End: 2022-11-10

## 2021-11-09 RX ORDER — DAPAGLIFLOZIN 10 MG/1
10 TABLET, FILM COATED ORAL DAILY
Qty: 90 TABLET | Refills: 0 | Status: SHIPPED | OUTPATIENT
Start: 2021-11-09 | End: 2022-01-06 | Stop reason: SDUPTHER

## 2021-11-11 ENCOUNTER — OFFICE VISIT (OUTPATIENT)
Dept: NEUROSURGERY | Facility: CLINIC | Age: 56
End: 2021-11-11
Payer: COMMERCIAL

## 2021-11-11 VITALS — DIASTOLIC BLOOD PRESSURE: 70 MMHG | HEART RATE: 79 BPM | SYSTOLIC BLOOD PRESSURE: 116 MMHG

## 2021-11-11 DIAGNOSIS — M50.30 DDD (DEGENERATIVE DISC DISEASE), CERVICAL: Primary | ICD-10-CM

## 2021-11-11 DIAGNOSIS — M54.12 CERVICAL RADICULOPATHY: ICD-10-CM

## 2021-11-11 DIAGNOSIS — G54.2 CERVICAL NERVE ROOT IMPINGEMENT: ICD-10-CM

## 2021-11-11 PROCEDURE — 1159F MED LIST DOCD IN RCRD: CPT | Mod: CPTII,S$GLB,, | Performed by: NURSE PRACTITIONER

## 2021-11-11 PROCEDURE — 1159F PR MEDICATION LIST DOCUMENTED IN MEDICAL RECORD: ICD-10-PCS | Mod: CPTII,S$GLB,, | Performed by: NURSE PRACTITIONER

## 2021-11-11 PROCEDURE — 99204 OFFICE O/P NEW MOD 45 MIN: CPT | Mod: S$GLB,,, | Performed by: NURSE PRACTITIONER

## 2021-11-11 PROCEDURE — 4010F PR ACE/ARB THEARPY RXD/TAKEN: ICD-10-PCS | Mod: CPTII,S$GLB,, | Performed by: NURSE PRACTITIONER

## 2021-11-11 PROCEDURE — 99204 PR OFFICE/OUTPT VISIT, NEW, LEVL IV, 45-59 MIN: ICD-10-PCS | Mod: S$GLB,,, | Performed by: NURSE PRACTITIONER

## 2021-11-11 PROCEDURE — 3078F PR MOST RECENT DIASTOLIC BLOOD PRESSURE < 80 MM HG: ICD-10-PCS | Mod: CPTII,S$GLB,, | Performed by: NURSE PRACTITIONER

## 2021-11-11 PROCEDURE — 3051F HG A1C>EQUAL 7.0%<8.0%: CPT | Mod: CPTII,S$GLB,, | Performed by: NURSE PRACTITIONER

## 2021-11-11 PROCEDURE — 3078F DIAST BP <80 MM HG: CPT | Mod: CPTII,S$GLB,, | Performed by: NURSE PRACTITIONER

## 2021-11-11 PROCEDURE — 3074F SYST BP LT 130 MM HG: CPT | Mod: CPTII,S$GLB,, | Performed by: NURSE PRACTITIONER

## 2021-11-11 PROCEDURE — 1160F RVW MEDS BY RX/DR IN RCRD: CPT | Mod: CPTII,S$GLB,, | Performed by: NURSE PRACTITIONER

## 2021-11-11 PROCEDURE — 3051F PR MOST RECENT HEMOGLOBIN A1C LEVEL 7.0 - < 8.0%: ICD-10-PCS | Mod: CPTII,S$GLB,, | Performed by: NURSE PRACTITIONER

## 2021-11-11 PROCEDURE — 3074F PR MOST RECENT SYSTOLIC BLOOD PRESSURE < 130 MM HG: ICD-10-PCS | Mod: CPTII,S$GLB,, | Performed by: NURSE PRACTITIONER

## 2021-11-11 PROCEDURE — 99999 PR PBB SHADOW E&M-EST. PATIENT-LVL IV: CPT | Mod: PBBFAC,,, | Performed by: NURSE PRACTITIONER

## 2021-11-11 PROCEDURE — 4010F ACE/ARB THERAPY RXD/TAKEN: CPT | Mod: CPTII,S$GLB,, | Performed by: NURSE PRACTITIONER

## 2021-11-11 PROCEDURE — 99999 PR PBB SHADOW E&M-EST. PATIENT-LVL IV: ICD-10-PCS | Mod: PBBFAC,,, | Performed by: NURSE PRACTITIONER

## 2021-11-11 PROCEDURE — 1160F PR REVIEW ALL MEDS BY PRESCRIBER/CLIN PHARMACIST DOCUMENTED: ICD-10-PCS | Mod: CPTII,S$GLB,, | Performed by: NURSE PRACTITIONER

## 2021-11-12 ENCOUNTER — PATIENT OUTREACH (OUTPATIENT)
Dept: ADMINISTRATIVE | Facility: OTHER | Age: 56
End: 2021-11-12
Payer: COMMERCIAL

## 2021-11-12 DIAGNOSIS — E11.69 DM TYPE 2 WITH DIABETIC MIXED HYPERLIPIDEMIA: Primary | ICD-10-CM

## 2021-11-12 DIAGNOSIS — E78.2 DM TYPE 2 WITH DIABETIC MIXED HYPERLIPIDEMIA: Primary | ICD-10-CM

## 2021-11-16 ENCOUNTER — OFFICE VISIT (OUTPATIENT)
Dept: DIABETES | Facility: CLINIC | Age: 56
End: 2021-11-16
Payer: COMMERCIAL

## 2021-11-16 VITALS
TEMPERATURE: 98 F | WEIGHT: 236.31 LBS | BODY MASS INDEX: 32.01 KG/M2 | HEIGHT: 72 IN | HEART RATE: 77 BPM | DIASTOLIC BLOOD PRESSURE: 70 MMHG | OXYGEN SATURATION: 97 % | SYSTOLIC BLOOD PRESSURE: 120 MMHG

## 2021-11-16 DIAGNOSIS — Z71.9 HEALTH EDUCATION/COUNSELING: ICD-10-CM

## 2021-11-16 DIAGNOSIS — E66.9 OBESITY (BMI 30-39.9): ICD-10-CM

## 2021-11-16 DIAGNOSIS — Z79.4 TYPE 2 DIABETES MELLITUS WITH HYPERGLYCEMIA, WITH LONG-TERM CURRENT USE OF INSULIN: Primary | Chronic | ICD-10-CM

## 2021-11-16 DIAGNOSIS — E78.2 MIXED HYPERLIPIDEMIA: ICD-10-CM

## 2021-11-16 DIAGNOSIS — E11.69 DM TYPE 2 WITH DIABETIC MIXED HYPERLIPIDEMIA: ICD-10-CM

## 2021-11-16 DIAGNOSIS — E11.65 TYPE 2 DIABETES MELLITUS WITH HYPERGLYCEMIA, WITH LONG-TERM CURRENT USE OF INSULIN: Primary | Chronic | ICD-10-CM

## 2021-11-16 DIAGNOSIS — I10 ESSENTIAL HYPERTENSION: ICD-10-CM

## 2021-11-16 DIAGNOSIS — E78.2 DM TYPE 2 WITH DIABETIC MIXED HYPERLIPIDEMIA: ICD-10-CM

## 2021-11-16 DIAGNOSIS — Z12.5 PROSTATE CANCER SCREENING: ICD-10-CM

## 2021-11-16 PROCEDURE — 3008F BODY MASS INDEX DOCD: CPT | Mod: CPTII,S$GLB,, | Performed by: NURSE PRACTITIONER

## 2021-11-16 PROCEDURE — 3051F PR MOST RECENT HEMOGLOBIN A1C LEVEL 7.0 - < 8.0%: ICD-10-PCS | Mod: CPTII,S$GLB,, | Performed by: NURSE PRACTITIONER

## 2021-11-16 PROCEDURE — 3074F SYST BP LT 130 MM HG: CPT | Mod: CPTII,S$GLB,, | Performed by: NURSE PRACTITIONER

## 2021-11-16 PROCEDURE — 3078F PR MOST RECENT DIASTOLIC BLOOD PRESSURE < 80 MM HG: ICD-10-PCS | Mod: CPTII,S$GLB,, | Performed by: NURSE PRACTITIONER

## 2021-11-16 PROCEDURE — 1160F RVW MEDS BY RX/DR IN RCRD: CPT | Mod: CPTII,S$GLB,, | Performed by: NURSE PRACTITIONER

## 2021-11-16 PROCEDURE — 3008F PR BODY MASS INDEX (BMI) DOCUMENTED: ICD-10-PCS | Mod: CPTII,S$GLB,, | Performed by: NURSE PRACTITIONER

## 2021-11-16 PROCEDURE — 99214 PR OFFICE/OUTPT VISIT, EST, LEVL IV, 30-39 MIN: ICD-10-PCS | Mod: S$GLB,,, | Performed by: NURSE PRACTITIONER

## 2021-11-16 PROCEDURE — 4010F ACE/ARB THERAPY RXD/TAKEN: CPT | Mod: CPTII,S$GLB,, | Performed by: NURSE PRACTITIONER

## 2021-11-16 PROCEDURE — 1159F MED LIST DOCD IN RCRD: CPT | Mod: CPTII,S$GLB,, | Performed by: NURSE PRACTITIONER

## 2021-11-16 PROCEDURE — 3074F PR MOST RECENT SYSTOLIC BLOOD PRESSURE < 130 MM HG: ICD-10-PCS | Mod: CPTII,S$GLB,, | Performed by: NURSE PRACTITIONER

## 2021-11-16 PROCEDURE — 3051F HG A1C>EQUAL 7.0%<8.0%: CPT | Mod: CPTII,S$GLB,, | Performed by: NURSE PRACTITIONER

## 2021-11-16 PROCEDURE — 99999 PR PBB SHADOW E&M-EST. PATIENT-LVL IV: ICD-10-PCS | Mod: PBBFAC,,, | Performed by: NURSE PRACTITIONER

## 2021-11-16 PROCEDURE — 1159F PR MEDICATION LIST DOCUMENTED IN MEDICAL RECORD: ICD-10-PCS | Mod: CPTII,S$GLB,, | Performed by: NURSE PRACTITIONER

## 2021-11-16 PROCEDURE — 99999 PR PBB SHADOW E&M-EST. PATIENT-LVL IV: CPT | Mod: PBBFAC,,, | Performed by: NURSE PRACTITIONER

## 2021-11-16 PROCEDURE — 3078F DIAST BP <80 MM HG: CPT | Mod: CPTII,S$GLB,, | Performed by: NURSE PRACTITIONER

## 2021-11-16 PROCEDURE — 1160F PR REVIEW ALL MEDS BY PRESCRIBER/CLIN PHARMACIST DOCUMENTED: ICD-10-PCS | Mod: CPTII,S$GLB,, | Performed by: NURSE PRACTITIONER

## 2021-11-16 PROCEDURE — 4010F PR ACE/ARB THEARPY RXD/TAKEN: ICD-10-PCS | Mod: CPTII,S$GLB,, | Performed by: NURSE PRACTITIONER

## 2021-11-16 PROCEDURE — 99214 OFFICE O/P EST MOD 30 MIN: CPT | Mod: S$GLB,,, | Performed by: NURSE PRACTITIONER

## 2021-11-16 RX ORDER — BLOOD-GLUCOSE TRANSMITTER
EACH MISCELLANEOUS
Qty: 1 EACH | Refills: 4 | Status: SHIPPED | OUTPATIENT
Start: 2021-11-16 | End: 2022-01-06 | Stop reason: SDUPTHER

## 2021-11-16 RX ORDER — BLOOD-GLUCOSE SENSOR
EACH MISCELLANEOUS
Qty: 3 EACH | Refills: 11 | Status: SHIPPED | OUTPATIENT
Start: 2021-11-16 | End: 2022-01-06 | Stop reason: SDUPTHER

## 2021-11-16 RX ORDER — INSULIN ASPART 100 [IU]/ML
INJECTION, SOLUTION INTRAVENOUS; SUBCUTANEOUS
Qty: 5 EACH | Refills: 6 | Status: SHIPPED | OUTPATIENT
Start: 2021-11-16 | End: 2022-06-21

## 2021-11-17 ENCOUNTER — TELEPHONE (OUTPATIENT)
Dept: DIABETES | Facility: CLINIC | Age: 56
End: 2021-11-17
Payer: COMMERCIAL

## 2021-11-18 ENCOUNTER — TELEPHONE (OUTPATIENT)
Dept: DIABETES | Facility: CLINIC | Age: 56
End: 2021-11-18
Payer: COMMERCIAL

## 2021-11-30 ENCOUNTER — CLINICAL SUPPORT (OUTPATIENT)
Dept: REHABILITATION | Facility: HOSPITAL | Age: 56
End: 2021-11-30
Payer: COMMERCIAL

## 2021-11-30 DIAGNOSIS — R29.898 DECREASED STRENGTH OF UPPER EXTREMITY: ICD-10-CM

## 2021-11-30 DIAGNOSIS — R29.898 DECREASED RANGE OF MOTION OF NECK: ICD-10-CM

## 2021-11-30 DIAGNOSIS — M50.30 DDD (DEGENERATIVE DISC DISEASE), CERVICAL: ICD-10-CM

## 2021-11-30 PROCEDURE — 97161 PT EVAL LOW COMPLEX 20 MIN: CPT | Mod: PN

## 2021-11-30 PROCEDURE — 97110 THERAPEUTIC EXERCISES: CPT | Mod: PN

## 2021-12-06 ENCOUNTER — TELEPHONE (OUTPATIENT)
Dept: DIABETES | Facility: CLINIC | Age: 56
End: 2021-12-06
Payer: COMMERCIAL

## 2021-12-07 ENCOUNTER — CLINICAL SUPPORT (OUTPATIENT)
Dept: REHABILITATION | Facility: HOSPITAL | Age: 56
End: 2021-12-07
Payer: COMMERCIAL

## 2021-12-07 DIAGNOSIS — R29.898 DECREASED RANGE OF MOTION OF NECK: ICD-10-CM

## 2021-12-07 DIAGNOSIS — R29.898 DECREASED STRENGTH OF UPPER EXTREMITY: ICD-10-CM

## 2021-12-07 PROCEDURE — 97110 THERAPEUTIC EXERCISES: CPT | Mod: PN

## 2021-12-07 PROCEDURE — 97112 NEUROMUSCULAR REEDUCATION: CPT | Mod: PN

## 2021-12-07 PROCEDURE — 97140 MANUAL THERAPY 1/> REGIONS: CPT | Mod: PN

## 2021-12-14 ENCOUNTER — CLINICAL SUPPORT (OUTPATIENT)
Dept: REHABILITATION | Facility: HOSPITAL | Age: 56
End: 2021-12-14
Payer: COMMERCIAL

## 2021-12-14 DIAGNOSIS — R29.898 DECREASED STRENGTH OF UPPER EXTREMITY: ICD-10-CM

## 2021-12-14 DIAGNOSIS — R29.898 DECREASED RANGE OF MOTION OF NECK: ICD-10-CM

## 2021-12-14 PROCEDURE — 97110 THERAPEUTIC EXERCISES: CPT | Mod: PN

## 2021-12-14 PROCEDURE — 97112 NEUROMUSCULAR REEDUCATION: CPT | Mod: PN

## 2021-12-14 PROCEDURE — 97140 MANUAL THERAPY 1/> REGIONS: CPT | Mod: PN

## 2021-12-21 ENCOUNTER — CLINICAL SUPPORT (OUTPATIENT)
Dept: REHABILITATION | Facility: HOSPITAL | Age: 56
End: 2021-12-21
Payer: COMMERCIAL

## 2021-12-21 DIAGNOSIS — R29.898 DECREASED RANGE OF MOTION OF NECK: ICD-10-CM

## 2021-12-21 DIAGNOSIS — R29.898 DECREASED STRENGTH OF UPPER EXTREMITY: ICD-10-CM

## 2021-12-21 PROCEDURE — 97140 MANUAL THERAPY 1/> REGIONS: CPT | Mod: PN

## 2021-12-21 PROCEDURE — 97110 THERAPEUTIC EXERCISES: CPT | Mod: PN

## 2021-12-21 PROCEDURE — 97112 NEUROMUSCULAR REEDUCATION: CPT | Mod: PN

## 2021-12-23 RX ORDER — LISINOPRIL AND HYDROCHLOROTHIAZIDE 12.5; 2 MG/1; MG/1
TABLET ORAL
Qty: 30 TABLET | Refills: 0 | Status: SHIPPED | OUTPATIENT
Start: 2021-12-23 | End: 2022-01-14

## 2022-01-11 ENCOUNTER — OFFICE VISIT (OUTPATIENT)
Dept: PRIMARY CARE CLINIC | Facility: CLINIC | Age: 57
End: 2022-01-11
Payer: COMMERCIAL

## 2022-01-11 VITALS
WEIGHT: 243.25 LBS | BODY MASS INDEX: 32.95 KG/M2 | OXYGEN SATURATION: 98 % | RESPIRATION RATE: 18 BRPM | SYSTOLIC BLOOD PRESSURE: 136 MMHG | HEIGHT: 72 IN | DIASTOLIC BLOOD PRESSURE: 80 MMHG | HEART RATE: 73 BPM

## 2022-01-11 DIAGNOSIS — E11.69 DM TYPE 2 WITH DIABETIC MIXED HYPERLIPIDEMIA: ICD-10-CM

## 2022-01-11 DIAGNOSIS — E78.2 MIXED HYPERLIPIDEMIA: ICD-10-CM

## 2022-01-11 DIAGNOSIS — Z23 NEED FOR VACCINATION: ICD-10-CM

## 2022-01-11 DIAGNOSIS — I10 ESSENTIAL HYPERTENSION: ICD-10-CM

## 2022-01-11 DIAGNOSIS — E78.2 DM TYPE 2 WITH DIABETIC MIXED HYPERLIPIDEMIA: ICD-10-CM

## 2022-01-11 DIAGNOSIS — Z00.00 ANNUAL PHYSICAL EXAM: Primary | ICD-10-CM

## 2022-01-11 PROBLEM — E66.811 OBESITY (BMI 30.0-34.9): Status: ACTIVE | Noted: 2019-07-16

## 2022-01-11 PROCEDURE — 3079F DIAST BP 80-89 MM HG: CPT | Mod: CPTII,S$GLB,, | Performed by: FAMILY MEDICINE

## 2022-01-11 PROCEDURE — 3008F BODY MASS INDEX DOCD: CPT | Mod: CPTII,S$GLB,, | Performed by: FAMILY MEDICINE

## 2022-01-11 PROCEDURE — 99396 PR PREVENTIVE VISIT,EST,40-64: ICD-10-PCS | Mod: S$GLB,,, | Performed by: FAMILY MEDICINE

## 2022-01-11 PROCEDURE — 99396 PREV VISIT EST AGE 40-64: CPT | Mod: S$GLB,,, | Performed by: FAMILY MEDICINE

## 2022-01-11 PROCEDURE — 1160F RVW MEDS BY RX/DR IN RCRD: CPT | Mod: CPTII,S$GLB,, | Performed by: FAMILY MEDICINE

## 2022-01-11 PROCEDURE — 3051F PR MOST RECENT HEMOGLOBIN A1C LEVEL 7.0 - < 8.0%: ICD-10-PCS | Mod: CPTII,S$GLB,, | Performed by: FAMILY MEDICINE

## 2022-01-11 PROCEDURE — 99999 PR PBB SHADOW E&M-EST. PATIENT-LVL IV: ICD-10-PCS | Mod: PBBFAC,,, | Performed by: FAMILY MEDICINE

## 2022-01-11 PROCEDURE — 3079F PR MOST RECENT DIASTOLIC BLOOD PRESSURE 80-89 MM HG: ICD-10-PCS | Mod: CPTII,S$GLB,, | Performed by: FAMILY MEDICINE

## 2022-01-11 PROCEDURE — 1159F PR MEDICATION LIST DOCUMENTED IN MEDICAL RECORD: ICD-10-PCS | Mod: CPTII,S$GLB,, | Performed by: FAMILY MEDICINE

## 2022-01-11 PROCEDURE — 1159F MED LIST DOCD IN RCRD: CPT | Mod: CPTII,S$GLB,, | Performed by: FAMILY MEDICINE

## 2022-01-11 PROCEDURE — 3008F PR BODY MASS INDEX (BMI) DOCUMENTED: ICD-10-PCS | Mod: CPTII,S$GLB,, | Performed by: FAMILY MEDICINE

## 2022-01-11 PROCEDURE — 1160F PR REVIEW ALL MEDS BY PRESCRIBER/CLIN PHARMACIST DOCUMENTED: ICD-10-PCS | Mod: CPTII,S$GLB,, | Performed by: FAMILY MEDICINE

## 2022-01-11 PROCEDURE — 3051F HG A1C>EQUAL 7.0%<8.0%: CPT | Mod: CPTII,S$GLB,, | Performed by: FAMILY MEDICINE

## 2022-01-11 PROCEDURE — 3075F SYST BP GE 130 - 139MM HG: CPT | Mod: CPTII,S$GLB,, | Performed by: FAMILY MEDICINE

## 2022-01-11 PROCEDURE — 3075F PR MOST RECENT SYSTOLIC BLOOD PRESS GE 130-139MM HG: ICD-10-PCS | Mod: CPTII,S$GLB,, | Performed by: FAMILY MEDICINE

## 2022-01-11 PROCEDURE — 99999 PR PBB SHADOW E&M-EST. PATIENT-LVL IV: CPT | Mod: PBBFAC,,, | Performed by: FAMILY MEDICINE

## 2022-01-11 RX ORDER — ZOSTER VACCINE RECOMBINANT, ADJUVANTED 50 MCG/0.5
0.5 KIT INTRAMUSCULAR ONCE
Qty: 1 EACH | Refills: 1 | Status: SHIPPED | OUTPATIENT
Start: 2022-01-11 | End: 2022-01-11

## 2022-01-11 NOTE — PROGRESS NOTES
Subjective:       Patient ID: Cedrick Saunders is a 56 y.o. male.    Chief Complaint: Annual Exam (Pt says he was told he needed to make an annual exam. )    Here for routine checkup. Tested positive for COVID on 12/31. Still has mild residual cough, but overall feeling better. Diabetes has generally been good, but struggling a little more recently since he has been off his Dexcom    Review of Systems   Constitutional: Negative for chills, fatigue and fever.   HENT: Negative for congestion.    Eyes: Negative for visual disturbance.   Respiratory: Positive for cough. Negative for shortness of breath.    Cardiovascular: Negative for chest pain.   Gastrointestinal: Negative for abdominal pain, nausea and vomiting.   Genitourinary: Negative for difficulty urinating.   Musculoskeletal: Negative for arthralgias.   Skin: Negative for rash.   Allergic/Immunologic: Negative for immunocompromised state.   Neurological: Negative for dizziness.   Psychiatric/Behavioral: Negative for agitation, confusion and sleep disturbance.       Objective:      Vitals:    01/11/22 1456   BP: 136/80   BP Location: Left arm   Patient Position: Sitting   BP Method: Large (Manual)   Pulse: 73   Resp: 18   SpO2: 98%   Weight: 110.3 kg (243 lb 4.4 oz)   Height: 6' (1.829 m)     Physical Exam  Vitals and nursing note reviewed.   Constitutional:       Appearance: He is well-developed and well-nourished.   HENT:      Head: Normocephalic and atraumatic.   Neck:      Vascular: No carotid bruit.   Cardiovascular:      Rate and Rhythm: Normal rate and regular rhythm.      Heart sounds: Normal heart sounds.   Pulmonary:      Effort: Pulmonary effort is normal.      Breath sounds: Normal breath sounds.   Musculoskeletal:         General: No edema.   Skin:     General: Skin is warm and dry.   Neurological:      Mental Status: He is alert and oriented to person, place, and time.   Psychiatric:         Mood and Affect: Mood normal.         Behavior:  Behavior normal.         Lab Results   Component Value Date    WBC 4.30 07/03/2019    HGB 14.7 07/03/2019    HCT 44.1 07/03/2019     07/03/2019    CHOL 113 01/11/2021    TRIG 137 01/11/2021    HDL 29 (L) 01/11/2021    ALT 22 01/11/2021    AST 15 01/11/2021     11/16/2021    K 4.0 11/16/2021     11/16/2021    CREATININE 0.7 11/16/2021    BUN 21 (H) 11/16/2021    CO2 21 (L) 11/16/2021    TSH 1.99 01/08/2020    PSA 2.4 10/08/2020    HGBA1C 7.9 (H) 11/16/2021      Assessment:       1. Annual physical exam    2. Need for vaccination    3. DM type 2 with diabetic mixed hyperlipidemia    4. Essential hypertension    5. Mixed hyperlipidemia        Plan:       Annual physical exam    Need for vaccination  -     varicella-zoster gE-AS01B, PF, (SHINGRIX, PF,) 50 mcg/0.5 mL injection; Inject 0.5 mLs into the muscle once. for 1 dose  Dispense: 1 each; Refill: 1    DM type 2 with diabetic mixed hyperlipidemia  F/u with diabetes management. Advised to see ophthalmologist  Essential hypertension  Well controlled  Mixed hyperlipidemia  Continue statin    Medication List with Changes/Refills   New Medications    VARICELLA-ZOSTER GE-AS01B, PF, (SHINGRIX, PF,) 50 MCG/0.5 ML INJECTION    Inject 0.5 mLs into the muscle once. for 1 dose   Current Medications    ASPIRIN (ECOTRIN) 81 MG EC TABLET    Take 81 mg by mouth once daily.    ATORVASTATIN (LIPITOR) 20 MG TABLET    TAKE 1 TABLET BY MOUTH EVERY DAY IN THE EVENING    BLOOD SUGAR DIAGNOSTIC STRP    To use to check Blood sugar 4 times daily. To use with accu check guide me    BLOOD-GLUCOSE SENSOR (DEXCOM G6 SENSOR) HAROON    1 sensor every 10 days    BLOOD-GLUCOSE TRANSMITTER (DEXCOM G6 TRANSMITTER) HAROON    1 transmitter every 3 months    DAPAGLIFLOZIN (FARXIGA) 10 MG TABLET    Take 1 tablet (10 mg total) by mouth once daily.    DICLOFENAC (VOLTAREN) 75 MG EC TABLET    Take 1 tablet (75 mg total) by mouth 2 (two) times daily as needed (pain).    ESCITALOPRAM OXALATE  "(LEXAPRO) 10 MG TABLET    Take 1 tablet (10 mg total) by mouth once daily.    INSULIN ASPART U-100 (NOVOLOG FLEXPEN U-100 INSULIN) 100 UNIT/ML (3 ML) INPN PEN    Inject 5-8 units 3 times per day with food, 3 units with snacking. + correction scale. Max TDD of 50 units    INSULIN DEGLUDEC (TRESIBA FLEXTOUCH U-200) 200 UNIT/ML (3 ML) INPN    Inject 42 Units into the skin once daily.    INSULIN SYRINGE-NEEDLE U-100 1/2 ML 31 GAUGE X 15/64" SYRG    To use 4 times per day with insulin injections    LISINOPRIL-HYDROCHLOROTHIAZIDE (PRINZIDE,ZESTORETIC) 20-12.5 MG PER TABLET    TAKE 1 TABLET BY MOUTH EVERY DAY    METFORMIN (GLUCOPHAGE) 1000 MG TABLET    Take 1 tablet (1,000 mg total) by mouth 2 (two) times daily with meals.    PEN NEEDLE, DIABETIC (BD ULTRA-FINE YAMILETH PEN NEEDLE) 32 GAUGE X 5/32" NDLE    To use with insulin 4 times per day    SEMAGLUTIDE (OZEMPIC) 1 MG/DOSE (2 MG/1.5 ML) PNIJ    Inject 1 mg into abdomen weekly   Discontinued Medications    PULSE OXIMETER (PULSE OXIMETER) DEVICE    by Apply Externally route 2 (two) times a day. Use twice daily at 8 AM and 3 PM and [facility] as [facility].           "

## 2022-01-14 ENCOUNTER — IMMUNIZATION (OUTPATIENT)
Dept: PRIMARY CARE CLINIC | Facility: CLINIC | Age: 57
End: 2022-01-14
Payer: COMMERCIAL

## 2022-01-14 DIAGNOSIS — Z23 NEED FOR VACCINATION: Primary | ICD-10-CM

## 2022-01-14 PROCEDURE — 0004A COVID-19, MRNA, LNP-S, PF, 30 MCG/0.3 ML DOSE VACCINE: CPT | Mod: PBBFAC | Performed by: EMERGENCY MEDICINE

## 2022-01-21 ENCOUNTER — PATIENT MESSAGE (OUTPATIENT)
Dept: ADMINISTRATIVE | Facility: HOSPITAL | Age: 57
End: 2022-01-21
Payer: COMMERCIAL

## 2022-01-28 ENCOUNTER — PATIENT MESSAGE (OUTPATIENT)
Dept: PRIMARY CARE CLINIC | Facility: CLINIC | Age: 57
End: 2022-01-28
Payer: COMMERCIAL

## 2022-01-28 ENCOUNTER — PATIENT MESSAGE (OUTPATIENT)
Dept: DIABETES | Facility: CLINIC | Age: 57
End: 2022-01-28
Payer: COMMERCIAL

## 2022-01-28 RX ORDER — SCOLOPAMINE TRANSDERMAL SYSTEM 1 MG/1
1 PATCH, EXTENDED RELEASE TRANSDERMAL
Qty: 4 PATCH | Refills: 0 | Status: SHIPPED | OUTPATIENT
Start: 2022-01-28 | End: 2023-03-20

## 2022-01-29 ENCOUNTER — PATIENT MESSAGE (OUTPATIENT)
Dept: PRIMARY CARE CLINIC | Facility: CLINIC | Age: 57
End: 2022-01-29
Payer: COMMERCIAL

## 2022-03-11 ENCOUNTER — PATIENT OUTREACH (OUTPATIENT)
Dept: ADMINISTRATIVE | Facility: OTHER | Age: 57
End: 2022-03-11
Payer: COMMERCIAL

## 2022-03-11 NOTE — PROGRESS NOTES
LINKS immunization registry updated  Care Everywhere updated  Health Maintenance updated  Chart reviewed for overdue Proactive [facility] ([facility]) health maintenance testing (CRS, Breast Ca, Diabetic Eye Exam)   Orders entered:N/A

## 2022-03-15 ENCOUNTER — DOCUMENTATION ONLY (OUTPATIENT)
Dept: REHABILITATION | Facility: HOSPITAL | Age: 57
End: 2022-03-15
Payer: COMMERCIAL

## 2022-03-15 NOTE — PROGRESS NOTES
Outpatient Therapy Discharge Summary     Name: Cedrick Saunders  [facility] Number: [id number]    Therapy Diagnosis: No diagnosis found.  Physician: No ref. provider found    Physician Orders: PT Eval and Treat   Medical Diagnosis: M50.30 (ICD-10-CM) - DDD (degenerative disc disease), cervical  Evaluation Date: 11/30/2021      Date of Last visit: 12/21/2021  Total Visits Received: 3      Assessment    Patient has not been seen in physical therapy since 12/21/2021. Unable to re-assess goals and objective measures secondary to patient no longer coming to PT. Patient to be discharged at this time.     Discharge reason: Patient has not attended therapy since 12/21/2021    Plan   This patient is discharged from Physical Therapy

## 2022-04-13 ENCOUNTER — PATIENT MESSAGE (OUTPATIENT)
Dept: PRIMARY CARE CLINIC | Facility: CLINIC | Age: 57
End: 2022-04-13
Payer: COMMERCIAL

## 2022-04-13 ENCOUNTER — HOSPITAL ENCOUNTER (OUTPATIENT)
Facility: HOSPITAL | Age: 57
Discharge: HOME | End: 2022-04-14
Attending: EMERGENCY MEDICINE | Admitting: EMERGENCY MEDICINE
Payer: COMMERCIAL

## 2022-04-13 DIAGNOSIS — L03.90 CELLULITIS, UNSPECIFIED CELLULITIS SITE: ICD-10-CM

## 2022-04-13 DIAGNOSIS — S61.431A PUNCTURE WOUND OF RIGHT HAND WITHOUT FOREIGN BODY, INITIAL ENCOUNTER: Primary | ICD-10-CM

## 2022-04-13 PROBLEM — L03.113 CELLULITIS OF RIGHT UPPER EXTREMITY: Status: ACTIVE | Noted: 2022-04-13

## 2022-04-13 LAB
ALBUMIN SERPL BCP-MCNC: 4.2 G/DL (ref 3.5–5.2)
ALP SERPL-CCNC: 70 U/L (ref 55–135)
ALT SERPL W/O P-5'-P-CCNC: 23 U/L (ref 10–44)
ANION GAP SERPL CALC-SCNC: 10 MMOL/L (ref 8–16)
AST SERPL-CCNC: 13 U/L (ref 10–40)
BASOPHILS # BLD AUTO: 0.02 K/UL (ref 0–0.2)
BASOPHILS NFR BLD: 0.3 % (ref 0–1.9)
BILIRUB SERPL-MCNC: 0.6 MG/DL (ref 0.1–1)
BUN SERPL-MCNC: 16 MG/DL (ref 6–20)
CALCIUM SERPL-MCNC: 9.6 MG/DL (ref 8.7–10.5)
CHLORIDE SERPL-SCNC: 101 MMOL/L (ref 95–110)
CO2 SERPL-SCNC: 24 MMOL/L (ref 23–29)
CREAT SERPL-MCNC: 0.6 MG/DL (ref 0.5–1.4)
CRP SERPL-MCNC: 1 MG/L (ref 0–8.2)
DIFFERENTIAL METHOD: ABNORMAL
EOSINOPHIL # BLD AUTO: 0.1 K/UL (ref 0–0.5)
EOSINOPHIL NFR BLD: 1.7 % (ref 0–8)
ERYTHROCYTE [DISTWIDTH] IN BLOOD BY AUTOMATED COUNT: 12.6 % (ref 11.5–14.5)
ERYTHROCYTE [SEDIMENTATION RATE] IN BLOOD BY WESTERGREN METHOD: 5 MM/HR (ref 0–23)
EST. GFR  (AFRICAN AMERICAN): >60 ML/MIN/1.73 M^2
EST. GFR  (NON AFRICAN AMERICAN): >60 ML/MIN/1.73 M^2
ESTIMATED AVG GLUCOSE: 189 MG/DL (ref 68–131)
GLUCOSE SERPL-MCNC: 122 MG/DL (ref 70–110)
HBA1C MFR BLD: 8.2 % (ref 4–5.6)
HCT VFR BLD AUTO: 44.7 % (ref 40–54)
HGB BLD-MCNC: 14.6 G/DL (ref 14–18)
IMM GRANULOCYTES # BLD AUTO: 0.03 K/UL (ref 0–0.04)
IMM GRANULOCYTES NFR BLD AUTO: 0.4 % (ref 0–0.5)
LYMPHOCYTES # BLD AUTO: 1.1 K/UL (ref 1–4.8)
LYMPHOCYTES NFR BLD: 14.4 % (ref 18–48)
MCH RBC QN AUTO: 30.4 PG (ref 27–31)
MCHC RBC AUTO-ENTMCNC: 32.7 G/DL (ref 32–36)
MCV RBC AUTO: 93 FL (ref 82–98)
MONOCYTES # BLD AUTO: 0.7 K/UL (ref 0.3–1)
MONOCYTES NFR BLD: 8.7 % (ref 4–15)
NEUTROPHILS # BLD AUTO: 5.8 K/UL (ref 1.8–7.7)
NEUTROPHILS NFR BLD: 74.5 % (ref 38–73)
NRBC BLD-RTO: 0 /100 WBC
PLATELET # BLD AUTO: 208 K/UL (ref 150–450)
PMV BLD AUTO: 10.4 FL (ref 9.2–12.9)
POCT GLUCOSE: 158 MG/DL (ref 70–110)
POCT GLUCOSE: 172 MG/DL (ref 70–110)
POTASSIUM SERPL-SCNC: 4 MMOL/L (ref 3.5–5.1)
PROT SERPL-MCNC: 7.3 G/DL (ref 6–8.4)
RBC # BLD AUTO: 4.8 M/UL (ref 4.6–6.2)
SODIUM SERPL-SCNC: 135 MMOL/L (ref 136–145)
WBC # BLD AUTO: 7.83 K/UL (ref 3.9–12.7)

## 2022-04-13 PROCEDURE — 85025 COMPLETE CBC W/AUTO DIFF WBC: CPT | Performed by: PHYSICIAN ASSISTANT

## 2022-04-13 PROCEDURE — 96365 THER/PROPH/DIAG IV INF INIT: CPT

## 2022-04-13 PROCEDURE — G0378 HOSPITAL OBSERVATION PER HR: HCPCS

## 2022-04-13 PROCEDURE — 82962 GLUCOSE BLOOD TEST: CPT

## 2022-04-13 PROCEDURE — 80053 COMPREHEN METABOLIC PANEL: CPT | Performed by: PHYSICIAN ASSISTANT

## 2022-04-13 PROCEDURE — 99284 PR EMERGENCY DEPT VISIT,LEVEL IV: ICD-10-PCS | Mod: ,,, | Performed by: EMERGENCY MEDICINE

## 2022-04-13 PROCEDURE — 86140 C-REACTIVE PROTEIN: CPT | Performed by: PHYSICIAN ASSISTANT

## 2022-04-13 PROCEDURE — C9399 UNCLASSIFIED DRUGS OR BIOLOG: HCPCS | Performed by: PHYSICIAN ASSISTANT

## 2022-04-13 PROCEDURE — 25000003 PHARM REV CODE 250: Performed by: PHYSICIAN ASSISTANT

## 2022-04-13 PROCEDURE — 90715 TDAP VACCINE 7 YRS/> IM: CPT | Performed by: PHYSICIAN ASSISTANT

## 2022-04-13 PROCEDURE — 99284 EMERGENCY DEPT VISIT MOD MDM: CPT | Mod: ,,, | Performed by: EMERGENCY MEDICINE

## 2022-04-13 PROCEDURE — 63600175 PHARM REV CODE 636 W HCPCS: Performed by: PHYSICIAN ASSISTANT

## 2022-04-13 PROCEDURE — 99285 EMERGENCY DEPT VISIT HI MDM: CPT | Mod: 25

## 2022-04-13 PROCEDURE — 99220 PR INITIAL OBSERVATION CARE,LEVL III: ICD-10-PCS | Mod: ,,, | Performed by: PHYSICIAN ASSISTANT

## 2022-04-13 PROCEDURE — 83036 HEMOGLOBIN GLYCOSYLATED A1C: CPT | Performed by: PHYSICIAN ASSISTANT

## 2022-04-13 PROCEDURE — 99220 PR INITIAL OBSERVATION CARE,LEVL III: CPT | Mod: ,,, | Performed by: PHYSICIAN ASSISTANT

## 2022-04-13 PROCEDURE — 86803 HEPATITIS C AB TEST: CPT | Performed by: EMERGENCY MEDICINE

## 2022-04-13 PROCEDURE — 85652 RBC SED RATE AUTOMATED: CPT | Performed by: PHYSICIAN ASSISTANT

## 2022-04-13 PROCEDURE — 87389 HIV-1 AG W/HIV-1&-2 AB AG IA: CPT | Performed by: EMERGENCY MEDICINE

## 2022-04-13 PROCEDURE — 96361 HYDRATE IV INFUSION ADD-ON: CPT

## 2022-04-13 PROCEDURE — 96372 THER/PROPH/DIAG INJ SC/IM: CPT | Performed by: PHYSICIAN ASSISTANT

## 2022-04-13 PROCEDURE — 90471 IMMUNIZATION ADMIN: CPT | Performed by: PHYSICIAN ASSISTANT

## 2022-04-13 RX ORDER — DOXYCYCLINE HYCLATE 100 MG
100 TABLET ORAL
Status: COMPLETED | OUTPATIENT
Start: 2022-04-13 | End: 2022-04-13

## 2022-04-13 RX ORDER — MAG HYDROX/ALUMINUM HYD/SIMETH 200-200-20
30 SUSPENSION, ORAL (FINAL DOSE FORM) ORAL 4 TIMES DAILY PRN
Status: DISCONTINUED | OUTPATIENT
Start: 2022-04-13 | End: 2022-04-14 | Stop reason: HOSPADM

## 2022-04-13 RX ORDER — ENOXAPARIN SODIUM 100 MG/ML
40 INJECTION SUBCUTANEOUS EVERY 24 HOURS
Status: DISCONTINUED | OUTPATIENT
Start: 2022-04-14 | End: 2022-04-14 | Stop reason: HOSPADM

## 2022-04-13 RX ORDER — IBUPROFEN 200 MG
800 TABLET ORAL DAILY PRN
COMMUNITY

## 2022-04-13 RX ORDER — IPRATROPIUM BROMIDE AND ALBUTEROL SULFATE 2.5; .5 MG/3ML; MG/3ML
3 SOLUTION RESPIRATORY (INHALATION) EVERY 4 HOURS PRN
Status: DISCONTINUED | OUTPATIENT
Start: 2022-04-13 | End: 2022-04-14 | Stop reason: HOSPADM

## 2022-04-13 RX ORDER — IBUPROFEN 200 MG
24 TABLET ORAL
Status: DISCONTINUED | OUTPATIENT
Start: 2022-04-13 | End: 2022-04-14 | Stop reason: HOSPADM

## 2022-04-13 RX ORDER — ESCITALOPRAM OXALATE 10 MG/1
10 TABLET ORAL DAILY
Status: DISCONTINUED | OUTPATIENT
Start: 2022-04-14 | End: 2022-04-14 | Stop reason: HOSPADM

## 2022-04-13 RX ORDER — KETOROLAC TROMETHAMINE 30 MG/ML
15 INJECTION, SOLUTION INTRAMUSCULAR; INTRAVENOUS EVERY 6 HOURS PRN
Status: DISCONTINUED | OUTPATIENT
Start: 2022-04-13 | End: 2022-04-14 | Stop reason: HOSPADM

## 2022-04-13 RX ORDER — BISACODYL 10 MG
10 SUPPOSITORY, RECTAL RECTAL DAILY PRN
Status: DISCONTINUED | OUTPATIENT
Start: 2022-04-13 | End: 2022-04-14 | Stop reason: HOSPADM

## 2022-04-13 RX ORDER — IBUPROFEN 200 MG
16 TABLET ORAL
Status: DISCONTINUED | OUTPATIENT
Start: 2022-04-13 | End: 2022-04-14 | Stop reason: HOSPADM

## 2022-04-13 RX ORDER — ATORVASTATIN CALCIUM 20 MG/1
20 TABLET, FILM COATED ORAL NIGHTLY
Status: DISCONTINUED | OUTPATIENT
Start: 2022-04-13 | End: 2022-04-14 | Stop reason: HOSPADM

## 2022-04-13 RX ORDER — PROCHLORPERAZINE EDISYLATE 5 MG/ML
5 INJECTION INTRAMUSCULAR; INTRAVENOUS EVERY 6 HOURS PRN
Status: DISCONTINUED | OUTPATIENT
Start: 2022-04-13 | End: 2022-04-14 | Stop reason: HOSPADM

## 2022-04-13 RX ORDER — SIMETHICONE 80 MG
1 TABLET,CHEWABLE ORAL 4 TIMES DAILY PRN
Status: DISCONTINUED | OUTPATIENT
Start: 2022-04-13 | End: 2022-04-14 | Stop reason: HOSPADM

## 2022-04-13 RX ORDER — INSULIN ASPART 100 [IU]/ML
3 INJECTION, SOLUTION INTRAVENOUS; SUBCUTANEOUS
Status: DISCONTINUED | OUTPATIENT
Start: 2022-04-13 | End: 2022-04-13

## 2022-04-13 RX ORDER — TALC
6 POWDER (GRAM) TOPICAL NIGHTLY PRN
Status: DISCONTINUED | OUTPATIENT
Start: 2022-04-13 | End: 2022-04-14 | Stop reason: HOSPADM

## 2022-04-13 RX ORDER — INSULIN ASPART 100 [IU]/ML
5 INJECTION, SOLUTION INTRAVENOUS; SUBCUTANEOUS
Status: DISCONTINUED | OUTPATIENT
Start: 2022-04-14 | End: 2022-04-14 | Stop reason: HOSPADM

## 2022-04-13 RX ORDER — LEVOFLOXACIN 750 MG/1
750 TABLET ORAL DAILY
Status: DISCONTINUED | OUTPATIENT
Start: 2022-04-13 | End: 2022-04-13 | Stop reason: ALTCHOICE

## 2022-04-13 RX ORDER — DICLOFENAC SODIUM 10 MG/G
2 GEL TOPICAL 2 TIMES DAILY PRN
COMMUNITY

## 2022-04-13 RX ORDER — INSULIN ASPART 100 [IU]/ML
0-5 INJECTION, SOLUTION INTRAVENOUS; SUBCUTANEOUS
Status: DISCONTINUED | OUTPATIENT
Start: 2022-04-13 | End: 2022-04-14 | Stop reason: HOSPADM

## 2022-04-13 RX ORDER — NALOXONE HCL 0.4 MG/ML
0.02 VIAL (ML) INJECTION
Status: DISCONTINUED | OUTPATIENT
Start: 2022-04-13 | End: 2022-04-14 | Stop reason: HOSPADM

## 2022-04-13 RX ORDER — ASPIRIN 81 MG/1
81 TABLET ORAL DAILY
Status: DISCONTINUED | OUTPATIENT
Start: 2022-04-14 | End: 2022-04-14 | Stop reason: HOSPADM

## 2022-04-13 RX ORDER — ONDANSETRON 4 MG/1
4 TABLET, ORALLY DISINTEGRATING ORAL EVERY 8 HOURS PRN
Status: DISCONTINUED | OUTPATIENT
Start: 2022-04-13 | End: 2022-04-14 | Stop reason: HOSPADM

## 2022-04-13 RX ORDER — LISINOPRIL 20 MG/1
20 TABLET ORAL DAILY
Status: DISCONTINUED | OUTPATIENT
Start: 2022-04-14 | End: 2022-04-14 | Stop reason: HOSPADM

## 2022-04-13 RX ORDER — GLUCAGON 1 MG
1 KIT INJECTION
Status: DISCONTINUED | OUTPATIENT
Start: 2022-04-13 | End: 2022-04-14 | Stop reason: HOSPADM

## 2022-04-13 RX ORDER — SODIUM CHLORIDE 0.9 % (FLUSH) 0.9 %
10 SYRINGE (ML) INJECTION
Status: DISCONTINUED | OUTPATIENT
Start: 2022-04-13 | End: 2022-04-14 | Stop reason: HOSPADM

## 2022-04-13 RX ORDER — SODIUM CHLORIDE 0.9 % (FLUSH) 0.9 %
5 SYRINGE (ML) INJECTION
Status: DISCONTINUED | OUTPATIENT
Start: 2022-04-13 | End: 2022-04-14 | Stop reason: HOSPADM

## 2022-04-13 RX ORDER — HYDROCHLOROTHIAZIDE 12.5 MG/1
12.5 TABLET ORAL DAILY
Status: DISCONTINUED | OUTPATIENT
Start: 2022-04-14 | End: 2022-04-14 | Stop reason: HOSPADM

## 2022-04-13 RX ORDER — ACETAMINOPHEN 325 MG/1
650 TABLET ORAL EVERY 4 HOURS PRN
Status: DISCONTINUED | OUTPATIENT
Start: 2022-04-13 | End: 2022-04-14 | Stop reason: HOSPADM

## 2022-04-13 RX ADMIN — INSULIN DETEMIR 21 UNITS: 100 INJECTION, SOLUTION SUBCUTANEOUS at 10:04

## 2022-04-13 RX ADMIN — DEXTROSE 1 G: 50 INJECTION, SOLUTION INTRAVENOUS at 01:04

## 2022-04-13 RX ADMIN — DOXYCYCLINE HYCLATE 100 MG: 100 TABLET, COATED ORAL at 11:04

## 2022-04-13 RX ADMIN — LEVOFLOXACIN 750 MG: 750 TABLET, FILM COATED ORAL at 11:04

## 2022-04-13 RX ADMIN — TETANUS TOXOID, REDUCED DIPHTHERIA TOXOID AND ACELLULAR PERTUSSIS VACCINE, ADSORBED 0.5 ML: 5; 2.5; 8; 8; 2.5 SUSPENSION INTRAMUSCULAR at 11:04

## 2022-04-13 RX ADMIN — INSULIN ASPART 3 UNITS: 100 INJECTION, SOLUTION INTRAVENOUS; SUBCUTANEOUS at 04:04

## 2022-04-13 RX ADMIN — ATORVASTATIN CALCIUM 20 MG: 20 TABLET, FILM COATED ORAL at 10:04

## 2022-04-13 NOTE — SUBJECTIVE & OBJECTIVE
Past Medical History:   Diagnosis Date    Anxiety     Diabetes mellitus     Diabetes mellitus, type 2     Encounter for blood transfusion     Hearing aid worn     Hearing deficit     Hypercholesteremia        Past Surgical History:   Procedure Laterality Date    bilateral knee replacement      COLONOSCOPY  05/17/2018    COLONOSCOPY N/A 5/17/2018    Procedure: COLONOSCOPY;  Surgeon: Joshua Ho MD;  Location: [facility];  Service: General;  Laterality: N/A;    ELBOW SURGERY Bilateral     bone spurs x 4    ELBOW SURGERY Right 10/13/2017    Excision bursa / I&D infected posterior elbow    JOINT REPLACEMENT Bilateral 2011    TKA's    KNEE ARTHROSCOPY W/ MENISCAL REPAIR Left        Review of patient's allergies indicates:   Allergen Reactions    Pollen extracts        No current facility-administered medications on file prior to encounter.     Current Outpatient Medications on File Prior to Encounter   Medication Sig    aspirin (ECOTRIN) 81 MG EC tablet Take 81 mg by mouth once daily.    atorvastatin (LIPITOR) 20 MG tablet TAKE 1 TABLET BY MOUTH EVERY DAY IN THE EVENING    blood-glucose transmitter (DEXCOM G6 TRANSMITTER) Leydi 1 transmitter every 3 months    dapagliflozin (FARXIGA) 10 mg tablet Take 1 tablet (10 mg total) by mouth once daily.    EScitalopram oxalate (LEXAPRO) 10 MG tablet Take 1 tablet (10 mg total) by mouth once daily.    insulin aspart U-100 (NOVOLOG FLEXPEN U-100 INSULIN) 100 unit/mL (3 mL) InPn pen Inject 5-8 units 3 times per day with food, 3 units with snacking. + correction scale. Max TDD of 50 units (Patient taking differently: Inject 5-14 units 3 times per day with food, 3 units with snacking. + correction scale. Max TDD of 50 units)    insulin degludec (TRESIBA FLEXTOUCH U-200) 200 unit/mL (3 mL) insulin pen Inject 42 Units into the skin once daily.    lisinopriL-hydrochlorothiazide (PRINZIDE,ZESTORETIC) 20-12.5 mg per tablet TAKE 1 TABLET BY MOUTH EVERY DAY    metFORMIN (GLUCOPHAGE) 1000  "MG tablet TAKE 1 TABLET BY MOUTH TWICE A DAY WITH MEALS    semaglutide (OZEMPIC) 1 mg/dose (2 mg/1.5 mL) PnIj Inject 1 mg into abdomen weekly (Patient taking differently: Inject 1 mg into the skin Every Friday.)    ACCU-CHEK GUIDE TEST STRIPS Strp TO USE TO CHECK BLOOD SUGAR 4 TIMES DAILY. TO USE WITH ACCU CHECK GUIDE ME    blood-glucose sensor (DEXCOM G6 SENSOR) Leydi 1 sensor every 10 days (Patient not taking: Reported on 1/11/2022)    diclofenac (VOLTAREN) 75 MG EC tablet Take 1 tablet (75 mg total) by mouth 2 (two) times daily as needed (pain).    diclofenac sodium (VOLTAREN) 1 % Gel Apply 2 g topically 2 (two) times daily as needed (shoulder pain).    ibuprofen (ADVIL,MOTRIN) 200 MG tablet Take 800 mg by mouth daily as needed for Pain.    insulin syringe-needle U-100 1/2 mL 31 gauge x 15/64" Syrg To use 4 times per day with insulin injections    pen needle, diabetic (BD ULTRA-FINE YAMILETH PEN NEEDLE) 32 gauge x 5/32" Ndle To use with insulin 4 times per day    scopolamine (TRANSDERM-SCOP) 1.3-1.5 mg (1 mg over 3 days) Place 1 patch onto the skin every 72 hours.     Family History       Problem Relation (Age of Onset)    Diabetes Father    Heart disease Father          Tobacco Use    Smoking status: Never Smoker    Smokeless tobacco: Never Used   Substance and Sexual Activity    Alcohol use: Yes     Comment: social    Drug use: No    Sexual activity: Not on file     Review of Systems   Constitutional:  Negative for chills and fever.   HENT:  Negative for ear pain and sore throat.    Eyes:  Negative for pain and visual disturbance.   Respiratory:  Negative for cough and shortness of breath.    Cardiovascular:  Negative for chest pain and palpitations.   Gastrointestinal:  Negative for abdominal pain, diarrhea, nausea and vomiting.   Endocrine: Negative for heat intolerance and polydipsia.   Genitourinary:  Negative for dysuria, frequency and urgency.   Musculoskeletal:  Negative for back pain and gait problem. "   Skin:  Positive for color change (redness) and wound. Negative for rash.   Neurological:  Negative for dizziness and weakness.   Psychiatric/Behavioral:  Negative for confusion. The patient is not nervous/anxious.    Objective:     Vital Signs (Most Recent):  Temp: 98.1 °F (36.7 °C) (04/13/22 1538)  Pulse: 64 (04/13/22 1538)  Resp: 16 (04/13/22 1538)  BP: 116/75 (04/13/22 1538)  SpO2: 100 % (04/13/22 1538) Vital Signs (24h Range):  Temp:  [98 °F (36.7 °C)-98.1 °F (36.7 °C)] 98.1 °F (36.7 °C)  Pulse:  [63-80] 64  Resp:  [16-18] 16  SpO2:  [98 %-100 %] 100 %  BP: (116-141)/(75-78) 116/75     Weight: 108 kg (238 lb)  Body mass index is 32.28 kg/m².    Physical Exam  Vitals and nursing note reviewed.   Constitutional:       General: He is not in acute distress.     Appearance: He is well-developed.   HENT:      Head: Normocephalic and atraumatic.      Comments: Poor hearing  L ear hearing aid  Eyes:      Conjunctiva/sclera: Conjunctivae normal.      Pupils: Pupils are equal, round, and reactive to light.   Cardiovascular:      Rate and Rhythm: Normal rate and regular rhythm.      Heart sounds: Normal heart sounds.   Pulmonary:      Effort: Pulmonary effort is normal.      Breath sounds: Normal breath sounds.   Abdominal:      General: Bowel sounds are normal.      Palpations: Abdomen is soft.   Musculoskeletal:         General: Normal range of motion.      Cervical back: Normal range of motion and neck supple.      Comments: Callouses to hands  Normal ROM of R wrist   Skin:     General: Skin is warm and dry.      Comments: Punctate wound between R thenar emminence and lateral wrist  Tracking on anterior side of arm that appears to track proximally with progressive erythema noted from 7am to 9 am  See pic below  No purulence/drainage able to be expressed from wound.     Neurological:      Mental Status: He is alert and oriented to person, place, and time. Mental status is at baseline.   Psychiatric:         Mood and  Affect: Mood normal.         Behavior: Behavior normal.         CRANIAL NERVES     CN III, IV, VI   Pupils are equal, round, and reactive to light.           Significant Labs: All pertinent labs within the past 24 hours have been reviewed.  CBC:   Recent Labs   Lab 04/13/22  1121   WBC 7.83   HGB 14.6   HCT 44.7        CMP:   Recent Labs   Lab 04/13/22  1121   *   K 4.0      CO2 24   *   BUN 16   CREATININE 0.6   CALCIUM 9.6   PROT 7.3   ALBUMIN 4.2   BILITOT 0.6   ALKPHOS 70   AST 13   ALT 23   ANIONGAP 10   EGFRNONAA >60.0       Significant Imaging: I have reviewed all pertinent imaging results/findings within the past 24 hours.

## 2022-04-13 NOTE — ED PROVIDER NOTES
Encounter Date: 4/13/2022       History     Chief Complaint   Patient presents with    Puncture Wound     Pt states he was puncture by a wire on a crab trap.       55 y/o M with history of hyperlipidemia, DM2 presents to the ED c/o cellulitis to the R hand and arm.  Yesterday, he was pulling in crab traps when a metal part of a crab trap punctured his R palmar hand.  +salt water exposure. He reports rapidly progressing streaking/cellulitis up the R arm since this morning. He is not currently on any abx.  He did clean the area well yesterday.  He is having some generalized myalgias. He denies fever, chills, chest pain, nausea, numbness, paresthesias, headache. Last tetanus in chart 2013 - he does not believe he had another one since then.    The history is provided by the patient.     Review of patient's allergies indicates:   Allergen Reactions    Pollen extracts      Past Medical History:   Diagnosis Date    Anxiety     Diabetes mellitus     Diabetes mellitus, type 2     Encounter for blood transfusion     Hearing aid worn     Hearing deficit     Hypercholesteremia      Past Surgical History:   Procedure Laterality Date    bilateral knee replacement      COLONOSCOPY  05/17/2018    COLONOSCOPY N/A 5/17/2018    Procedure: COLONOSCOPY;  Surgeon: Joshua Ho MD;  Location: [facility];  Service: General;  Laterality: N/A;    ELBOW SURGERY Bilateral     bone spurs x 4    ELBOW SURGERY Right 10/13/2017    Excision bursa / I&D infected posterior elbow    JOINT REPLACEMENT Bilateral 2011    TKA's    KNEE ARTHROSCOPY W/ MENISCAL REPAIR Left      Family History   Problem Relation Age of Onset    Heart disease Father     Diabetes Father      Social History     Tobacco Use    Smoking status: Never Smoker    Smokeless tobacco: Never Used   Substance Use Topics    Alcohol use: Yes     Comment: social    Drug use: No     Review of Systems   Constitutional: Negative for chills and fever.   HENT: Negative  for congestion, rhinorrhea and sore throat.    Eyes: Negative for photophobia and visual disturbance.   Respiratory: Negative for cough.    Cardiovascular: Negative for chest pain.   Gastrointestinal: Negative for abdominal pain, constipation, diarrhea, nausea and vomiting.   Genitourinary: Negative for dysuria and hematuria.   Musculoskeletal: Positive for myalgias. Negative for arthralgias.   Skin: Positive for color change and wound.   Neurological: Negative for weakness, light-headedness, numbness and headaches.   Psychiatric/Behavioral: Negative for confusion.       Physical Exam     Initial Vitals [04/13/22 0951]   BP Pulse Resp Temp SpO2   (!) 141/78 80 18 98 °F (36.7 °C) 98 %      MAP       --         Physical Exam    Nursing note and vitals reviewed.  Constitutional: He appears well-developed and well-nourished. He is not diaphoretic. No distress.   HENT:   Head: Normocephalic and atraumatic.   Neck: Neck supple.   Normal range of motion.  Cardiovascular: Normal rate, regular rhythm and normal heart sounds. Exam reveals no gallop and no friction rub.    No murmur heard.  Pulmonary/Chest: Breath sounds normal. He has no wheezes. He has no rhonchi. He has no rales.   Abdominal: Abdomen is soft. Bowel sounds are normal. There is no abdominal tenderness. There is no rebound and no guarding.   Musculoskeletal:         General: Tenderness present. Normal range of motion.      Cervical back: Normal range of motion and neck supple.      Comments: Swelling, tenderness, cellulitis with puncture wound noted to the R thenar eminence of hand. No tenderness to flexor tendons of R hand. Normal ROM of the R wrist. Streaking/cellulitis extends up the R arm to the mid upper arm.     Neurological: He is alert and oriented to person, place, and time.   Skin: Skin is warm and dry. No rash noted. No erythema.   Psychiatric: He has a normal mood and affect.         ED Course   Procedures  Labs Reviewed   CBC W/ AUTO DIFFERENTIAL  - Abnormal; Notable for the following components:       Result Value    Gran % 74.5 (*)     Lymph % 14.4 (*)     All other components within normal limits   COMPREHENSIVE METABOLIC PANEL - Abnormal; Notable for the following components:    Sodium 135 (*)     Glucose 122 (*)     All other components within normal limits   SEDIMENTATION RATE   C-REACTIVE PROTEIN   HIV 1 / 2 ANTIBODY   HEPATITIS C ANTIBODY          Imaging Results          X-Ray Hand 3 view Right (Final result)  Result time 04/13/22 12:00:02    Final result by Jaron Black MD (04/13/22 12:00:02)                 Impression:      No displaced fracture.      Electronically signed by: Jaron Black MD  Date:    04/13/2022  Time:    12:00             Narrative:    EXAMINATION:  XR HAND COMPLETE 3 VIEW RIGHT    CLINICAL HISTORY:  puncture wound;    TECHNIQUE:  Three views of the right hand.    COMPARISON:  11/02/2021.    FINDINGS:  No evidence of acute fracture or dislocation.  Mild-to-moderate degenerative changes, most pronounced at the 1st carpometacarpal joint.  Soft tissues are symmetric.  No unexpected radiopaque foreign body.                                 Medications   levoFLOXacin tablet 750 mg (750 mg Oral Given 4/13/22 1142)   Tdap (BOOSTRIX) vaccine injection 0.5 mL (0.5 mLs Intramuscular Given 4/13/22 1142)   doxycycline tablet 100 mg (100 mg Oral Given 4/13/22 1142)   cefazolin 1g in dextrose 5% 50 mL IVPB (ready to mix system) (0 g Intravenous Stopped 4/13/22 1355)           APC / Resident Notes:   55 y/o M with history of hyperlipidemia, DM2 presents to the ED c/o cellulitis to the R hand and arm. VSS. There is localized swelilng, cellulitis and puncture wound to the palmar aspect of the R hand - thenar eminence. Normal ROM and strength to the R wrist. There is erythema, streaking, cellulitis up to the mid upper arm. R radial pulse 2+. There is no tenderness to the flexor tendons of the hand.  I do not suspect septic arthritis or  flexor tenosynovitis. DDx includes but is not limited to cellulitis, retained foreign body. No palpable abscess.    Tetanus updated in the ED. Given IV ancef, levaquin, doxycycline.    No leukocytosis. CMP unremarkable. ESR and CRP normal.    Xray R hand with no retained foreign body.     Due to rapid progression of cellulitis, will place in observation to  for further management.        Attending Attestation:     Physician Attestation Statement for NP/PA:   I discussed this assessment and plan of this patient with the NP/PA, but I did not personally examine the patient. The face to face encounter was performed by the NP/PA.                       Clinical Impression:   Final diagnoses:  [S61.431A] Puncture wound of right hand without foreign body, initial encounter (Primary)  [L03.90] Cellulitis, unspecified cellulitis site          ED Disposition Condition    Observation               Quita Recinos PA-C  04/13/22 1423       Lori Berman MD  05/09/22 8032

## 2022-04-13 NOTE — ED NOTES
Patient identifiers verified and correct for Mr Saunders  C/C: wound to right inner wrist SEE NN  APPEARANCE: awake and alert in NAD.  SKIN: warm, dry 3 cm area to right palm hand near wrist with red streak to mid upper arm, reports drainage this am   MUSCULOSKELETAL: Patient moving all extremities spontaneously, no obvious swelling or deformities noted. Ambulates independently.  RESPIRATORY: Denies shortness of breath.Respirations unlabored. Denies fevers  CARDIAC: Denies CP, 2+ distal pulses; no peripheral edema  ABDOMEN: S/ND/NT, Denies nausea  : voids spontaneously, denies difficulty  Neurologic: AAO x 4; follows commands equal strength in all extremities; denies numbness/tingling. Denies dizziness  Denies weakness,

## 2022-04-13 NOTE — HPI
Cedrick Saunders is a 56 y.o. M with T2DM, HLD who [facility] ED 4/13 c/o acute cellulitis to the R hand and arm with proximal tracking.  Yesterday, he was pulling in crab traps when a metal part of a crab trap punctured his R palmar hand.  He reports he was in [place] working as a  (+salt water exposure). He reports the piece was about 1/2 inch long and he has noticed purulent drainage come from it today.  He lifted his sleeve today and noted that he has tracking up his arm and so decided to come in. He reports normal use of his R hand/wrist and denies fever, chills, chest pain, nausea, numbness, paresthesias, headache. No exacerbating factors. Last tetanus in chart 2013 - given updated tetanus in ED prior to admission.     In the ED, AFVSS, no leukocytosis. Inflammatory markers not elevated, intake labs unremarkable.  XR R hand with no osseous abnormality or retained foreign substance. Given Doxy, levaquin, and cefazolin.

## 2022-04-13 NOTE — ASSESSMENT & PLAN NOTE
- controlled  Lab Results   Component Value Date    HGBA1C 8.2 (H) 04/13/2022     - home regimen: dapagliflozin (FARXIGA) 10 mg tablet, metFORMIN (GLUCOPHAGE) 1000 MG tablet, semaglutide (OZEMPIC) injection weekly, long acting insulin 42 U QHS, aspart 5-10 U TIDWM  - inpatient regimen: basal insulin 21 U, aspart 5U TIDWM  - low dose SSI, ACHS acchuchecks  - Diabetic diet 2000 calories  - monitor for hypoglycemia  Recent Labs   Lab 04/13/22  1537   POCTGLUCOSE 158*

## 2022-04-13 NOTE — ASSESSMENT & PLAN NOTE
Denies any issues with BP, but states was started on ACEI for diabetes/kidneys  - c/w lisinopriL 20 mg + hydrochlorothiazide 12.5 mg per tablet PO daily

## 2022-04-13 NOTE — ASSESSMENT & PLAN NOTE
55 y/o diabetic who presents with RUE cellulitis + lymphatic tracking proximally s/p puncture wound from crab trap.  + salt water exposure +scant purulent drainage    - ID consulted and appreciate recs  - Given levaquin, doxy, cefazolin   --> Starting Doxy and Zosyn to cover for vibrio (+MRSA) and pseudomonas  - 0/4 SIRS  - plain films with no concerning findings, inflammatory markers WNL  - ROM in tact, no paresthesias  - supportive care

## 2022-04-13 NOTE — ASSESSMENT & PLAN NOTE
Body mass index is 32.28 kg/m².   - obesity complicates all aspects of disease management from diagnostic modalities to treatment, especially in the setting of multiple co-morbidities   - weight loss encouraged and health benefits explained to patient.  - exercise, portion control, and calorie restricted diet encouraged   - patient currently physically active, but does report lots of snacking, specifically potato chips

## 2022-04-13 NOTE — PHARMACY MED REC
"  Admission Medication History     The home medication history was taken by Anali Bird.    You may go to "Admission" then "Reconcile Home Medications" tabs to review and/or act upon these items.      The home medication list has been updated by the Pharmacy department.    Please read ALL comments highlighted in yellow.    Please address this information as you see fit.     Feel free to contact us if you have any questions or require assistance.        Medications listed below were obtained from: Patient/family    Medication Sig    aspirin (ECOTRIN) 81 MG EC tablet   Take 81 mg by mouth once daily.    atorvastatin (LIPITOR) 20 MG tablet   TAKE 1 TABLET BY MOUTH EVERY DAY IN THE EVENING    blood-glucose transmitter (DEXCOM G6 TRANSMITTER) Leydi   1 transmitter every 3 months    dapagliflozin (FARXIGA) 10 mg tablet   Take 1 tablet (10 mg total) by mouth once daily.    EScitalopram oxalate (LEXAPRO) 10 MG tablet   Take 1 tablet (10 mg total) by mouth once daily.    insulin aspart U-100 (NOVOLOG FLEXPEN U-100 INSULIN) 100 unit/mL (3 mL) InPn pen    Inject 5-14 units 3 times per day with food, 3 units with snacking. + correction scale. Max TDD of 50 units    insulin degludec (TRESIBA FLEXTOUCH U-200) 200 unit/mL (3 mL) insulin pen   Inject 42 Units into the skin once daily.    lisinopriL-hydrochlorothiazide (PRINZIDE,ZESTORETIC) 20-12.5 mg per tablet   TAKE 1 TABLET BY MOUTH EVERY DAY    metFORMIN (GLUCOPHAGE) 1000 MG tablet   TAKE 1 TABLET BY MOUTH TWICE A DAY WITH MEALS    semaglutide (OZEMPIC) 1 mg/dose (2 mg/1.5 mL) PnIj   Inject 1 mg into the skin Every Friday.    ACCU-CHEK GUIDE TEST STRIPS Strp   TO USE TO CHECK BLOOD SUGAR 4 TIMES DAILY. TO USE WITH ACCU CHECK GUIDE ME    blood-glucose sensor (DEXCOM G6 SENSOR) Leydi   1 sensor every 10 days (Patient not taking: Reported on 1/11/2022)    diclofenac (VOLTAREN) 75 MG EC tablet Take 1 tablet (75 mg total) by mouth 2 (two) times daily as needed " Hospital Medicine Progress Note, Adult, Complex               Author: Beronica Martinez Date & Time created: 4/14/2017  8:34 AM     CC: LE wound    Interval History:  Stool yellow per patient report, no diarrhea  BP lowish, asymptomatic    Review of Systems:  Review of Systems   Constitutional: Negative for fever.   HENT: Negative for congestion and sore throat.    Respiratory: Negative for cough and shortness of breath.    Cardiovascular: Negative for chest pain.   Gastrointestinal: Negative for nausea, vomiting, abdominal pain, diarrhea and constipation.   Genitourinary: Negative for dysuria.   Neurological: Negative for dizziness and headaches.       T 98.2P54BP 99/60GQ42HcE6 96% Martinez/O2.4/brp   Physical Exam   Constitutional: She is oriented to person, place, and time. She appears well-developed. No distress.   HENT:   Head: Normocephalic and atraumatic.   Eyes: Conjunctivae are normal. Right eye exhibits no discharge. Left eye exhibits no discharge. No scleral icterus.   Neck: Neck supple. No tracheal deviation present.   Cardiovascular: Normal rate and intact distal pulses.    Pulmonary/Chest: Effort normal. No respiratory distress. She has no wheezes. She exhibits no tenderness.   Abdominal: Soft. Bowel sounds are normal. She exhibits no distension. There is no tenderness. There is no rebound.   obese   Musculoskeletal: She exhibits no edema.   Neurological: She is alert and oriented to person, place, and time.   Skin: Skin is warm.   Wound with dressing   Vitals reviewed.      Labs:        Invalid input(s): [id number]      No results for input(s): SODIUM, POTASSIUM, CHLORIDE, CO2, BUN, CREATININE, MAGNESIUM, PHOSPHORUS, CALCIUM in the last 72 hours.  No results for input(s): ALTSGPT, ASTSGOT, ALKPHOSPHAT, TBILIRUBIN, DBILIRUBIN, GAMMAGT, AMYLASE, LIPASE, ALB, PREALBUMIN, GLUCOSE in the last 72 hours.  No results for input(s): RBC, HEMOGLOBIN, HEMATOCRIT, PLATELETCT, PROTHROMBTM, APTT, INR, IRON, FERRITIN,  "(pain).    diclofenac sodium (VOLTAREN) 1 % Gel   Apply 2 g topically 2 (two) times daily as needed (shoulder pain).    ibuprofen (ADVIL,MOTRIN) 200 MG tablet   Take 800 mg by mouth daily as needed for Pain.    insulin syringe-needle U-100 1/2 mL 31 gauge x 15/64" Syrg   To use 4 times per day with insulin injections    pen needle, diabetic (BD ULTRA-FINE YAMILETH PEN NEEDLE) 32 gauge x 5/32" Ndle To use with insulin 4 times per day    scopolamine (TRANSDERM-SCOP) 1.3-1.5 mg (1 mg over 3 days) Place 1 patch onto the skin every 72 hours.         Anali Bird  EXT [id number]                    .          " TOTIRONBC in the last 72 hours.      Medical Decision Making, by Problem:  Resolved sepsis PTA  R LE traumatic wound/cellulitis   -polymicrobial   -cefuroxime thru 4/23, IM as refuses IV   -refuses cam boot  CKD 4   -avoid nephrotoxins, outpatient nephro follow up   -Na HCo3  Epilepsy   -trileptal, dilantin  Brewer Brewer syndrome with CVA   -progressive cognitive decline   -mother to pursue gaurdianship  HTN   -norvasc (decrease to 5mg), lisinopril, coreg (decrease to BID)  Asthma   -peak flow meter when complies   -prn nebs, ventolin   -declines regimen with steroid, LABA  HLD   -lipitor  Anemia of CKD  Nutrition   -po  Debility   -cleared by PT to ambulate    Medications reviewed  Dey catheter: No Dey      DVT Prophylaxis: Not indicated at this time, ambulatory (refuses heparin)      Antibiotics: Treating active infection/contamination beyond 24 hours perioperative coverage

## 2022-04-13 NOTE — ED NOTES
"Patient states a broken wire from an older crab trap cut his right arm yesterday, has "0700 and 0900" marked in pen on arm. Denies fevers, no current antibiotics.  "

## 2022-04-13 NOTE — H&P
[facility] - Emergency Dept  [facility] Medicine  History & Physical    Patient Name: Cedrick Saunders  MRN: [id number]  Patient Class: OP- Observation  Admission Date: 4/13/2022  Attending Physician: Lori Berman MD   Primary Care Provider: Alex Mckoy MD         Patient information was obtained from patient, past medical records and ER records.     Subjective:     Principal Problem:Cellulitis of right upper extremity    Chief Complaint:   Chief Complaint   Patient presents with    Puncture Wound     Pt states he was puncture by a wire on a crab trap.          HPI: Cedrick Saunders is a 56 y.o. M with T2DM, HLD who [facility] ED 4/13 c/o acute cellulitis to the R hand and arm with proximal tracking.  Yesterday, he was pulling in crab traps when a metal part of a crab trap punctured his R palmar hand.  He reports he was in [employer] working as a  (+salt water exposure). He reports the piece was about 1/2 inch long and he has noticed purulent drainage come from it today.  He lifted his sleeve today and noted that he has tracking up his arm and so decided to come in. He reports normal use of his R hand/wrist and denies fever, chills, chest pain, nausea, numbness, paresthesias, headache. No exacerbating factors. Last tetanus in chart 2013 - given updated tetanus in ED prior to admission.     In the ED, AFVSS, no leukocytosis. Inflammatory markers not elevated, intake labs unremarkable.  XR R hand with no osseous abnormality or retained foreign substance. Given Doxy, levaquin, and cefazolin.       Past Medical History:   Diagnosis Date    Anxiety     Diabetes mellitus     Diabetes mellitus, type 2     Encounter for blood transfusion     Hearing aid worn     Hearing deficit     Hypercholesteremia        Past Surgical History:   Procedure Laterality Date    bilateral knee replacement      COLONOSCOPY  05/17/2018    COLONOSCOPY N/A 5/17/2018    Procedure: COLONOSCOPY;  Surgeon:   MAX Ho MD;  [facility];  Service: General;  Laterality: N/A;    ELBOW SURGERY Bilateral     bone spurs x 4    ELBOW SURGERY Right 10/13/2017    Excision bursa / I&D infected posterior elbow    JOINT REPLACEMENT Bilateral 2011    TKA's    KNEE ARTHROSCOPY W/ MENISCAL REPAIR Left        Review of patient's allergies indicates:   Allergen Reactions    Pollen extracts        No current facility-administered medications on file prior to encounter.     Current Outpatient Medications on File Prior to Encounter   Medication Sig    aspirin (ECOTRIN) 81 MG EC tablet Take 81 mg by mouth once daily.    atorvastatin (LIPITOR) 20 MG tablet TAKE 1 TABLET BY MOUTH EVERY DAY IN THE EVENING    blood-glucose transmitter (DEXCOM G6 TRANSMITTER) Leydi 1 transmitter every 3 months    dapagliflozin (FARXIGA) 10 mg tablet Take 1 tablet (10 mg total) by mouth once daily.    EScitalopram oxalate (LEXAPRO) 10 MG tablet Take 1 tablet (10 mg total) by mouth once daily.    insulin aspart U-100 (NOVOLOG FLEXPEN U-100 INSULIN) 100 unit/mL (3 mL) InPn pen Inject 5-8 units 3 times per day with food, 3 units with snacking. + correction scale. Max TDD of 50 units (Patient taking differently: Inject 5-14 units 3 times per day with food, 3 units with snacking. + correction scale. Max TDD of 50 units)    insulin degludec (TRESIBA FLEXTOUCH U-200) 200 unit/mL (3 mL) insulin pen Inject 42 Units into the skin once daily.    lisinopriL-hydrochlorothiazide (PRINZIDE,ZESTORETIC) 20-12.5 mg per tablet TAKE 1 TABLET BY MOUTH EVERY DAY    metFORMIN (GLUCOPHAGE) 1000 MG tablet TAKE 1 TABLET BY MOUTH TWICE A DAY WITH MEALS    semaglutide (OZEMPIC) 1 mg/dose (2 mg/1.5 mL) PnIj Inject 1 mg into abdomen weekly (Patient taking differently: Inject 1 mg into the skin Every Friday.)    ACCU-CHEK GUIDE TEST STRIPS Strp TO USE TO CHECK BLOOD SUGAR 4 TIMES DAILY. TO USE WITH ACCU CHECK GUIDE ME    blood-glucose sensor (DEXCOM G6 SENSOR) Leydi 1  "sensor every 10 days (Patient not taking: Reported on 1/11/2022)    diclofenac (VOLTAREN) 75 MG EC tablet Take 1 tablet (75 mg total) by mouth 2 (two) times daily as needed (pain).    diclofenac sodium (VOLTAREN) 1 % Gel Apply 2 g topically 2 (two) times daily as needed (shoulder pain).    ibuprofen (ADVIL,MOTRIN) 200 MG tablet Take 800 mg by mouth daily as needed for Pain.    insulin syringe-needle U-100 1/2 mL 31 gauge x 15/64" Syrg To use 4 times per day with insulin injections    pen needle, diabetic (BD ULTRA-FINE YAMILETH PEN NEEDLE) 32 gauge x 5/32" Ndle To use with insulin 4 times per day    scopolamine (TRANSDERM-SCOP) 1.3-1.5 mg (1 mg over 3 days) Place 1 patch onto the skin every 72 hours.     Family History       Problem Relation (Age of Onset)    Diabetes Father    Heart disease Father          Tobacco Use    Smoking status: Never Smoker    Smokeless tobacco: Never Used   Substance and Sexual Activity    Alcohol use: Yes     Comment: social    Drug use: No    Sexual activity: Not on file     Review of Systems   Constitutional:  Negative for chills and fever.   HENT:  Negative for ear pain and sore throat.    Eyes:  Negative for pain and visual disturbance.   Respiratory:  Negative for cough and shortness of breath.    Cardiovascular:  Negative for chest pain and palpitations.   Gastrointestinal:  Negative for abdominal pain, diarrhea, nausea and vomiting.   Endocrine: Negative for heat intolerance and polydipsia.   Genitourinary:  Negative for dysuria, frequency and urgency.   Musculoskeletal:  Negative for back pain and gait problem.   Skin:  Positive for color change (redness) and wound. Negative for rash.   Neurological:  Negative for dizziness and weakness.   Psychiatric/Behavioral:  Negative for confusion. The patient is not nervous/anxious.    Objective:     Vital Signs (Most Recent):  Temp: 98.1 °F (36.7 °C) (04/13/22 1538)  Pulse: 64 (04/13/22 1538)  Resp: 16 (04/13/22 1538)  BP: 116/75 " (04/13/22 1538)  SpO2: 100 % (04/13/22 1538) Vital Signs (24h Range):  Temp:  [98 °F (36.7 °C)-98.1 °F (36.7 °C)] 98.1 °F (36.7 °C)  Pulse:  [63-80] 64  Resp:  [16-18] 16  SpO2:  [98 %-100 %] 100 %  BP: (116-141)/(75-78) 116/75     Weight: 108 kg (238 lb)  Body mass index is 32.28 kg/m².    Physical Exam  Vitals and nursing note reviewed.   Constitutional:       General: He is not in acute distress.     Appearance: He is well-developed.   HENT:      Head: Normocephalic and atraumatic.      Comments: Poor hearing  L ear hearing aid  Eyes:      Conjunctiva/sclera: Conjunctivae normal.      Pupils: Pupils are equal, round, and reactive to light.   Cardiovascular:      Rate and Rhythm: Normal rate and regular rhythm.      Heart sounds: Normal heart sounds.   Pulmonary:      Effort: Pulmonary effort is normal.      Breath sounds: Normal breath sounds.   Abdominal:      General: Bowel sounds are normal.      Palpations: Abdomen is soft.   Musculoskeletal:         General: Normal range of motion.      Cervical back: Normal range of motion and neck supple.      Comments: Callouses to hands  Normal ROM of R wrist   Skin:     General: Skin is warm and dry.      Comments: Punctate wound between R thenar emminence and lateral wrist  Tracking on anterior side of arm that appears to track proximally with progressive erythema noted from 7am to 9 am  See pic below  No purulence/drainage able to be expressed from wound.     Neurological:      Mental Status: He is alert and oriented to person, place, and time. Mental status is at baseline.   Psychiatric:         Mood and Affect: Mood normal.         Behavior: Behavior normal.         CRANIAL NERVES     CN III, IV, VI   Pupils are equal, round, and reactive to light.           Significant Labs: All pertinent labs within the past 24 hours have been reviewed.  CBC:   Recent Labs   Lab 04/13/22  1121   WBC 7.83   HGB 14.6   HCT 44.7        CMP:   Recent Labs   Lab 04/13/22  1121    *   K 4.0      CO2 24   *   BUN 16   CREATININE 0.6   CALCIUM 9.6   PROT 7.3   ALBUMIN 4.2   BILITOT 0.6   ALKPHOS 70   AST 13   ALT 23   ANIONGAP 10   EGFRNONAA >60.0       Significant Imaging: I have reviewed all pertinent imaging results/findings within the past 24 hours.    Assessment/Plan:     * Cellulitis of right upper extremity  57 y/o diabetic who presents with RUE cellulitis + lymphatic tracking proximally s/p puncture wound from crab trap.  + salt water exposure +scant purulent drainage    - ID consulted and appreciate recs  - Given levaquin, doxy, cefazolin   --> Starting Doxy and Zosyn to cover for vibrio (+MRSA) and pseudomonas  - 0/4 SIRS  - plain films with no concerning findings, inflammatory markers WNL  - ROM in tact, no paresthesias  - supportive care    Obesity (BMI 30.0-34.9)  Body mass index is 32.28 kg/m².   - obesity complicates all aspects of disease management from diagnostic modalities to treatment, especially in the setting of multiple co-morbidities   - weight loss encouraged and health benefits explained to patient.  - exercise, portion control, and calorie restricted diet encouraged   - patient currently physically active, but does report lots of snacking, specifically potato chips    Essential hypertension  Denies any issues with BP, but states was started on ACEI for diabetes/kidneys  - c/w lisinopriL 20 mg + hydrochlorothiazide 12.5 mg per tablet PO daily    DM type 2 with diabetic mixed hyperlipidemia  - controlled  Lab Results   Component Value Date    HGBA1C 8.2 (H) 04/13/2022     - home regimen: dapagliflozin (FARXIGA) 10 mg tablet, metFORMIN (GLUCOPHAGE) 1000 MG tablet, semaglutide (OZEMPIC) injection weekly, long acting insulin 42 U QHS, aspart 5-10 U TIDWM  - inpatient regimen: basal insulin 21 U, aspart 5U TIDWM  - low dose SSI, ACHS acchuchecks  - Diabetic diet 2000 calories  - monitor for hypoglycemia  Recent Labs   Lab 04/13/22  1537   POCTGLUCOSE  158*         VTE Risk Mitigation (From admission, onward)         Ordered     enoxaparin injection 40 mg  Daily         04/13/22 1442     IP VTE HIGH RISK PATIENT  Once         04/13/22 1442     Place sequential compression device  Until discontinued         04/13/22 1442                   Antonio Cortes PA-C  Department of Hospital Medicine   [facility] - Emergency Dept

## 2022-04-14 VITALS
BODY MASS INDEX: 32.23 KG/M2 | HEART RATE: 71 BPM | WEIGHT: 238 LBS | OXYGEN SATURATION: 97 % | TEMPERATURE: 99 F | SYSTOLIC BLOOD PRESSURE: 120 MMHG | DIASTOLIC BLOOD PRESSURE: 70 MMHG | HEIGHT: 72 IN | RESPIRATION RATE: 16 BRPM

## 2022-04-14 LAB
HCV AB SERPL QL IA: NEGATIVE
HIV 1+2 AB+HIV1 P24 AG SERPL QL IA: NEGATIVE
POCT GLUCOSE: 125 MG/DL (ref 70–110)
POCT GLUCOSE: 133 MG/DL (ref 70–110)

## 2022-04-14 PROCEDURE — G0378 HOSPITAL OBSERVATION PER HR: HCPCS

## 2022-04-14 PROCEDURE — 25000003 PHARM REV CODE 250: Performed by: PHYSICIAN ASSISTANT

## 2022-04-14 PROCEDURE — 96366 THER/PROPH/DIAG IV INF ADDON: CPT | Performed by: EMERGENCY MEDICINE

## 2022-04-14 PROCEDURE — 96365 THER/PROPH/DIAG IV INF INIT: CPT | Performed by: EMERGENCY MEDICINE

## 2022-04-14 PROCEDURE — 96372 THER/PROPH/DIAG INJ SC/IM: CPT | Performed by: PHYSICIAN ASSISTANT

## 2022-04-14 PROCEDURE — 99217 PR OBSERVATION CARE DISCHARGE: ICD-10-PCS | Mod: ,,, | Performed by: INTERNAL MEDICINE

## 2022-04-14 PROCEDURE — 63600175 PHARM REV CODE 636 W HCPCS: Performed by: PHYSICIAN ASSISTANT

## 2022-04-14 PROCEDURE — 96367 TX/PROPH/DG ADDL SEQ IV INF: CPT | Performed by: EMERGENCY MEDICINE

## 2022-04-14 PROCEDURE — 99204 OFFICE O/P NEW MOD 45 MIN: CPT | Mod: ,,, | Performed by: INTERNAL MEDICINE

## 2022-04-14 PROCEDURE — 99217 PR OBSERVATION CARE DISCHARGE: CPT | Mod: ,,, | Performed by: INTERNAL MEDICINE

## 2022-04-14 PROCEDURE — 99204 PR OFFICE/OUTPT VISIT, NEW, LEVL IV, 45-59 MIN: ICD-10-PCS | Mod: ,,, | Performed by: INTERNAL MEDICINE

## 2022-04-14 RX ORDER — DOXYCYCLINE 100 MG/1
100 CAPSULE ORAL 2 TIMES DAILY
Qty: 14 CAPSULE | Refills: 0 | Status: SHIPPED | OUTPATIENT
Start: 2022-04-14 | End: 2022-04-21

## 2022-04-14 RX ORDER — LEVOFLOXACIN 750 MG/1
750 TABLET ORAL DAILY
Qty: 7 TABLET | Refills: 0 | Status: SHIPPED | OUTPATIENT
Start: 2022-04-14 | End: 2022-04-21

## 2022-04-14 RX ADMIN — PIPERACILLIN AND TAZOBACTAM 4.5 G: 4; .5 INJECTION, POWDER, LYOPHILIZED, FOR SOLUTION INTRAVENOUS; PARENTERAL at 05:04

## 2022-04-14 RX ADMIN — DOXYCYCLINE 100 MG: 100 INJECTION, POWDER, LYOPHILIZED, FOR SOLUTION INTRAVENOUS at 12:04

## 2022-04-14 RX ADMIN — ASPIRIN 81 MG: 81 TABLET, COATED ORAL at 09:04

## 2022-04-14 RX ADMIN — ESCITALOPRAM OXALATE 10 MG: 10 TABLET ORAL at 09:04

## 2022-04-14 RX ADMIN — HYDROCHLOROTHIAZIDE 12.5 MG: 12.5 TABLET ORAL at 09:04

## 2022-04-14 RX ADMIN — INSULIN ASPART 5 UNITS: 100 INJECTION, SOLUTION INTRAVENOUS; SUBCUTANEOUS at 08:04

## 2022-04-14 RX ADMIN — INSULIN ASPART 5 UNITS: 100 INJECTION, SOLUTION INTRAVENOUS; SUBCUTANEOUS at 12:04

## 2022-04-14 RX ADMIN — LISINOPRIL 20 MG: 20 TABLET ORAL at 09:04

## 2022-04-14 NOTE — SUBJECTIVE & OBJECTIVE
Past Medical History:   Diagnosis Date    Anxiety     Diabetes mellitus     Diabetes mellitus, type 2     Encounter for blood transfusion     Hearing aid worn     Hearing deficit     Hypercholesteremia        Past Surgical History:   Procedure Laterality Date    bilateral knee replacement      COLONOSCOPY  05/17/2018    COLONOSCOPY N/A 5/17/2018    Procedure: COLONOSCOPY;  Surgeon: Joshua Ho MD;  Location: [facility];  Service: General;  Laterality: N/A;    ELBOW SURGERY Bilateral     bone spurs x 4    ELBOW SURGERY Right 10/13/2017    Excision bursa / I&D infected posterior elbow    JOINT REPLACEMENT Bilateral 2011    TKA's    KNEE ARTHROSCOPY W/ MENISCAL REPAIR Left        Review of patient's allergies indicates:   Allergen Reactions    Pollen extracts        Medications:  Medications Prior to Admission   Medication Sig    aspirin (ECOTRIN) 81 MG EC tablet Take 81 mg by mouth once daily.    atorvastatin (LIPITOR) 20 MG tablet TAKE 1 TABLET BY MOUTH EVERY DAY IN THE EVENING    blood-glucose transmitter (DEXCOM G6 TRANSMITTER) Leydi 1 transmitter every 3 months    dapagliflozin (FARXIGA) 10 mg tablet Take 1 tablet (10 mg total) by mouth once daily.    EScitalopram oxalate (LEXAPRO) 10 MG tablet Take 1 tablet (10 mg total) by mouth once daily.    insulin aspart U-100 (NOVOLOG FLEXPEN U-100 INSULIN) 100 unit/mL (3 mL) InPn pen Inject 5-8 units 3 times per day with food, 3 units with snacking. + correction scale. Max TDD of 50 units (Patient taking differently: Inject 5-14 units 3 times per day with food, 3 units with snacking. + correction scale. Max TDD of 50 units)    insulin degludec (TRESIBA FLEXTOUCH U-200) 200 unit/mL (3 mL) insulin pen Inject 42 Units into the skin once daily.    lisinopriL-hydrochlorothiazide (PRINZIDE,ZESTORETIC) 20-12.5 mg per tablet TAKE 1 TABLET BY MOUTH EVERY DAY    metFORMIN (GLUCOPHAGE) 1000 MG tablet TAKE 1 TABLET BY MOUTH TWICE A DAY WITH MEALS     "semaglutide (OZEMPIC) 1 mg/dose (2 mg/1.5 mL) PnIj Inject 1 mg into abdomen weekly (Patient taking differently: Inject 1 mg into the skin Every Friday.)    ACCU-CHEK GUIDE TEST STRIPS Strp TO USE TO CHECK BLOOD SUGAR 4 TIMES DAILY. TO USE WITH ACCU CHECK GUIDE ME    blood-glucose sensor (DEXCOM G6 SENSOR) Leydi 1 sensor every 10 days (Patient not taking: Reported on 1/11/2022)    diclofenac (VOLTAREN) 75 MG EC tablet Take 1 tablet (75 mg total) by mouth 2 (two) times daily as needed (pain).    diclofenac sodium (VOLTAREN) 1 % Gel Apply 2 g topically 2 (two) times daily as needed (shoulder pain).    ibuprofen (ADVIL,MOTRIN) 200 MG tablet Take 800 mg by mouth daily as needed for Pain.    insulin syringe-needle U-100 1/2 mL 31 gauge x 15/64" Syrg To use 4 times per day with insulin injections    pen needle, diabetic (BD ULTRA-FINE YAMILETH PEN NEEDLE) 32 gauge x 5/32" Ndle To use with insulin 4 times per day    scopolamine (TRANSDERM-SCOP) 1.3-1.5 mg (1 mg over 3 days) Place 1 patch onto the skin every 72 hours.     Antibiotics (From admission, onward)                Start     Stop Route Frequency Ordered    04/14/22 0600  piperacillin-tazobactam 4.5 g in sodium chloride 0.9% 100 mL IVPB (ready to mix system)         -- IV Every 8 hours (non-standard times) 04/13/22 1707    04/13/22 2300  doxycycline (VIBRAMYCIN) 100 mg in dextrose 5 % 250 mL IVPB         -- IV Every 12 hours (non-standard times) 04/13/22 1707          Antifungals (From admission, onward)                None          Antivirals (From admission, onward)      None             Immunization History   Administered Date(s) Administered    COVID-19, MRNA, LN-S, PF (Pfizer) (Purple Cap) 04/29/2021, 05/27/2021, 01/14/2022    Influenza - Quadrivalent - PF *Preferred* (6 months and older) 12/20/2018, 10/23/2019, 11/02/2021    Pneumococcal Polysaccharide - 23 Valent 12/02/2015    Td (ADULT) 12/20/2013    Tdap 04/13/2022       Family History       Problem " Relation (Age of Onset)    Diabetes Father    Heart disease Father          Social History     Socioeconomic History    Marital status:    Tobacco Use    Smoking status: Never Smoker    Smokeless tobacco: Never Used   Substance and Sexual Activity    Alcohol use: Yes     Comment: social    Drug use: No     Review of Systems   Constitutional:  Negative for activity change, chills and fever.   Respiratory:  Negative for chest tightness and shortness of breath.    Cardiovascular:  Negative for chest pain.   Gastrointestinal:  Negative for abdominal pain, nausea and vomiting.   Musculoskeletal:  Negative for arthralgias.   Skin:  Positive for color change (redness on right wrist and payan hand- improving) and wound (two puncture wounds on R payan hand/wrist).   Psychiatric/Behavioral:  Negative for confusion and decreased concentration. The patient is not nervous/anxious.    Objective:     Vital Signs (Most Recent):  Temp: 98.6 °F (37 °C) (04/14/22 1150)  Pulse: 71 (04/14/22 1150)  Resp: 16 (04/14/22 1150)  BP: 120/70 (04/14/22 1150)  SpO2: 97 % (04/14/22 1150) Vital Signs (24h Range):  Temp:  [96.9 °F (36.1 °C)-98.6 °F (37 °C)] 98.6 °F (37 °C)  Pulse:  [63-71] 71  Resp:  [16-18] 16  SpO2:  [95 %-100 %] 97 %  BP: (114-126)/(63-79) 120/70     Weight: 108 kg (238 lb)  Body mass index is 32.28 kg/m².    Estimated Creatinine Clearance: 170.5 mL/min (based on SCr of 0.6 mg/dL).    Physical Exam  Vitals and nursing note reviewed.   Constitutional:       General: He is not in acute distress.     Appearance: Normal appearance. He is not ill-appearing, toxic-appearing or diaphoretic.   HENT:      Head: Normocephalic.   Eyes:      Conjunctiva/sclera: Conjunctivae normal.   Cardiovascular:      Rate and Rhythm: Normal rate and regular rhythm.      Pulses: Normal pulses.      Heart sounds: Normal heart sounds. No murmur heard.  Pulmonary:      Effort: Pulmonary effort is normal.      Breath sounds: Normal breath  sounds.   Abdominal:      General: Bowel sounds are normal. There is no distension.      Palpations: Abdomen is soft.      Tenderness: There is no abdominal tenderness.   Musculoskeletal:         General: Swelling (right wrist- minimal but improved from prior), tenderness (mild near right wrist/hand area- improving) and signs of injury (two puncture wounds to right wrist/hand) present.   Skin:     General: Skin is warm and dry.   Neurological:      General: No focal deficit present.      Mental Status: He is alert and oriented to person, place, and time.   Psychiatric:         Mood and Affect: Mood normal.         Behavior: Behavior normal.         Thought Content: Thought content normal.       Significant Labs: All pertinent labs within the past 24 hours have been reviewed.    Significant Imaging: I have reviewed all pertinent imaging results/findings within the past 24 hours.

## 2022-04-14 NOTE — PLAN OF CARE
[facility] - Telemetry Stepdown ([facility])  Discharge Assessment    Primary Care Provider: Alex Mckoy MD     Discharge Assessment (most recent)     BRIEF DISCHARGE ASSESSMENT - 04/14/22 1210        Discharge Planning    Assessment Type Discharge Planning Brief Assessment     Resource/Environmental Concerns none     Support Systems Children     Equipment Currently Used at Home none     Current Living Arrangements home/apartment/condo     Patient/Family Anticipates Transition to home     Patient/Family Anticipated Services at Transition none     DME Needed Upon Discharge  none     Discharge Plan A Home     Discharge Plan B Home                 Pt is a 56 y.o. male admitted with Cellulitis RUE and has a PMH of T2DM. He lives alone in a singles [facility] house with no steps to enter. He is independent with all of his ADLs and IADLs and works full time. [facility] Discharge Packet given to patient and/or family with understanding verbalized.   name and number and estimated discharge date written on white board in patient's room with request to call for any questions or concerns.  Will continue to follow for needs.  Hussein Segura RN,BSN

## 2022-04-14 NOTE — CONSULTS
[facility] ([place] [address])  Infectious Disease  Consult Note    Patient Name: Cedrick Saunders  MRN: [id number]  Admission Date: 4/13/2022  Hospital Length of Stay: 0 days  Attending Physician: Warren Garrett MD  Primary Care Provider: Alex Mckoy MD     Isolation Status: No active isolations    Patient information was obtained from patient, past medical records and ER records.      Inpatient consult to Infectious Diseases  Consult performed by: Juliet Mahajan NP  Consult ordered by: Antonio Cortes PA-C        Assessment/Plan:     * Cellulitis of right upper extremity  Mr. Saunders is 57 yo M with PMH of T2DM, HLD to ED with worsening redness, swelling, and streaking to the right hand, wrist, arm x 2 days after a puncture wound to right hand from crab trap. Remains afebrile, no leukocytosis, inflammatory markers not elevated, HDS. R hand XR unremarkable, no osseous abnormality or retained foreign substance.       Recommendations:  · transition to oral doxy and Levaquin x7 d once discharged   · Continue local wound care  · Counseled pt to wear sun protective clothing while on doxy as risk for photosensitivity   · Counseled pt to no take Levaquin with dairy products due to decreased absorption        Thank you for your consult. I will sign off. Please contact us if you have any additional questions.    Juliet Mahajan NP  Infectious Disease  [facility] ([place])    Subjective:     Principal Problem: Cellulitis of right upper extremity    HPI: Cedrick Saunders is a 57 yo M with PMH of T2DM, HLD, and s/p harry knee replacement x10 yr ago who presented to ED on 4/13 with a c/o of right hand swelling, redness, and red streaking up his right arm. Pt stated that on 4/12 he was pulling in crab traps when a metal portion of the crab trap punctured his R palmar hand. Pt reported he crabs in [place] and was exposed to salt water. Pt states the crab trap punctured through  his gloves into his skin. On 4/13 AM pt states he noticed red streaking that progressed up his arm through the morning which proceeded him to go to the ED. Pt denied fever, chills, or N/V. Reported normal use of R hand/wrist ROM. Stated that the swelling in his arm/wrist/hand has decreased since he has came to the ED. Pt states he has a dog at home. States he has a history of staph infections in the past, usually managed with oral abx with no issues. Pt has remained afebrile throughout admission. Remains with no leukocytosis and inflammatory markers are not elevated. R hand imaging reviewed and no osseous abnormality or retained foreign substance noted.       Past Medical History:   Diagnosis Date    Anxiety     Diabetes mellitus     Diabetes mellitus, type 2     Encounter for blood transfusion     Hearing aid worn     Hearing deficit     Hypercholesteremia        Past Surgical History:   Procedure Laterality Date    bilateral knee replacement      COLONOSCOPY  05/17/2018    COLONOSCOPY N/A 5/17/2018    Procedure: COLONOSCOPY;  Surgeon: Joshua Ho MD;  Location: [facility];  Service: General;  Laterality: N/A;    ELBOW SURGERY Bilateral     bone spurs x 4    ELBOW SURGERY Right 10/13/2017    Excision bursa / I&D infected posterior elbow    JOINT REPLACEMENT Bilateral 2011    TKA's    KNEE ARTHROSCOPY W/ MENISCAL REPAIR Left        Review of patient's allergies indicates:   Allergen Reactions    Pollen extracts        Medications:  Medications Prior to Admission   Medication Sig    aspirin (ECOTRIN) 81 MG EC tablet Take 81 mg by mouth once daily.    atorvastatin (LIPITOR) 20 MG tablet TAKE 1 TABLET BY MOUTH EVERY DAY IN THE EVENING    blood-glucose transmitter (DEXCOM G6 TRANSMITTER) Leydi 1 transmitter every 3 months    dapagliflozin (FARXIGA) 10 mg tablet Take 1 tablet (10 mg total) by mouth once daily.    EScitalopram oxalate (LEXAPRO) 10 MG tablet Take 1 tablet (10 mg total) by mouth once  "daily.    insulin aspart U-100 (NOVOLOG FLEXPEN U-100 INSULIN) 100 unit/mL (3 mL) InPn pen Inject 5-8 units 3 times per day with food, 3 units with snacking. + correction scale. Max TDD of 50 units (Patient taking differently: Inject 5-14 units 3 times per day with food, 3 units with snacking. + correction scale. Max TDD of 50 units)    insulin degludec (TRESIBA FLEXTOUCH U-200) 200 unit/mL (3 mL) insulin pen Inject 42 Units into the skin once daily.    lisinopriL-hydrochlorothiazide (PRINZIDE,ZESTORETIC) 20-12.5 mg per tablet TAKE 1 TABLET BY MOUTH EVERY DAY    metFORMIN (GLUCOPHAGE) 1000 MG tablet TAKE 1 TABLET BY MOUTH TWICE A DAY WITH MEALS    semaglutide (OZEMPIC) 1 mg/dose (2 mg/1.5 mL) PnIj Inject 1 mg into abdomen weekly (Patient taking differently: Inject 1 mg into the skin Every Friday.)    ACCU-CHEK GUIDE TEST STRIPS Strp TO USE TO CHECK BLOOD SUGAR 4 TIMES DAILY. TO USE WITH ACCU CHECK GUIDE ME    blood-glucose sensor (DEXCOM G6 SENSOR) Leydi 1 sensor every 10 days (Patient not taking: Reported on 1/11/2022)    diclofenac (VOLTAREN) 75 MG EC tablet Take 1 tablet (75 mg total) by mouth 2 (two) times daily as needed (pain).    diclofenac sodium (VOLTAREN) 1 % Gel Apply 2 g topically 2 (two) times daily as needed (shoulder pain).    ibuprofen (ADVIL,MOTRIN) 200 MG tablet Take 800 mg by mouth daily as needed for Pain.    insulin syringe-needle U-100 1/2 mL 31 gauge x 15/64" Syrg To use 4 times per day with insulin injections    pen needle, diabetic (BD ULTRA-FINE YAMILETH PEN NEEDLE) 32 gauge x 5/32" Ndle To use with insulin 4 times per day    scopolamine (TRANSDERM-SCOP) 1.3-1.5 mg (1 mg over 3 days) Place 1 patch onto the skin every 72 hours.     Antibiotics (From admission, onward)                Start     Stop Route Frequency Ordered    04/14/22 0600  piperacillin-tazobactam 4.5 g in sodium chloride 0.9% 100 mL IVPB (ready to mix system)         -- IV Every 8 hours (non-standard times) 04/13/22 " 1707    04/13/22 2300  doxycycline (VIBRAMYCIN) 100 mg in dextrose 5 % 250 mL IVPB         -- IV Every 12 hours (non-standard times) 04/13/22 1707          Antifungals (From admission, onward)                None          Antivirals (From admission, onward)      None             Immunization History   Administered Date(s) Administered    COVID-19, MRNA, LN-S, PF (Pfizer) (Purple Cap) 04/29/2021, 05/27/2021, 01/14/2022    Influenza - Quadrivalent - PF *Preferred* (6 months and older) 12/20/2018, 10/23/2019, 11/02/2021    Pneumococcal Polysaccharide - 23 Valent 12/02/2015    Td (ADULT) 12/20/2013    Tdap 04/13/2022       Family History       Problem Relation (Age of Onset)    Diabetes Father    Heart disease Father          Social History     Socioeconomic History    Marital status:    Tobacco Use    Smoking status: Never Smoker    Smokeless tobacco: Never Used   Substance and Sexual Activity    Alcohol use: Yes     Comment: social    Drug use: No     Review of Systems   Constitutional:  Negative for activity change, chills and fever.   Respiratory:  Negative for chest tightness and shortness of breath.    Cardiovascular:  Negative for chest pain.   Gastrointestinal:  Negative for abdominal pain, nausea and vomiting.   Musculoskeletal:  Negative for arthralgias.   Skin:  Positive for color change (redness on right wrist and payan hand- improving) and wound (two puncture wounds on R payan hand/wrist).   Psychiatric/Behavioral:  Negative for confusion and decreased concentration. The patient is not nervous/anxious.    Objective:     Vital Signs (Most Recent):  Temp: 98.6 °F (37 °C) (04/14/22 1150)  Pulse: 71 (04/14/22 1150)  Resp: 16 (04/14/22 1150)  BP: 120/70 (04/14/22 1150)  SpO2: 97 % (04/14/22 1150) Vital Signs (24h Range):  Temp:  [96.9 °F (36.1 °C)-98.6 °F (37 °C)] 98.6 °F (37 °C)  Pulse:  [63-71] 71  Resp:  [16-18] 16  SpO2:  [95 %-100 %] 97 %  BP: (114-126)/(63-79) 120/70     Weight: 108 kg  (238 lb)  Body mass index is 32.28 kg/m².    Estimated Creatinine Clearance: 170.5 mL/min (based on SCr of 0.6 mg/dL).    Physical Exam  Vitals and nursing note reviewed.   Constitutional:       General: He is not in acute distress.     Appearance: Normal appearance. He is not ill-appearing, toxic-appearing or diaphoretic.   HENT:      Head: Normocephalic.   Eyes:      Conjunctiva/sclera: Conjunctivae normal.   Cardiovascular:      Rate and Rhythm: Normal rate and regular rhythm.      Pulses: Normal pulses.      Heart sounds: Normal heart sounds. No murmur heard.  Pulmonary:      Effort: Pulmonary effort is normal.      Breath sounds: Normal breath sounds.   Abdominal:      General: Bowel sounds are normal. There is no distension.      Palpations: Abdomen is soft.      Tenderness: There is no abdominal tenderness.   Musculoskeletal:         General: Swelling (right wrist- minimal but improved from prior), tenderness (mild near right wrist/hand area- improving) and signs of injury (two puncture wounds to right wrist/hand) present.   Skin:     General: Skin is warm and dry.   Neurological:      General: No focal deficit present.      Mental Status: He is alert and oriented to person, place, and time.   Psychiatric:         Mood and Affect: Mood normal.         Behavior: Behavior normal.         Thought Content: Thought content normal.       Significant Labs: All pertinent labs within the past 24 hours have been reviewed.    Significant Imaging: I have reviewed all pertinent imaging results/findings within the past 24 hours.

## 2022-04-14 NOTE — NURSING
Pt AAOx4. Pt and spouse verbalizes understanding of discharge instructions. Will pickup meds from [facility] in [place]. Pt will travel home via private vehicle.

## 2022-04-14 NOTE — HPI
Cedrick Saunders is a 55 yo M with PMH of T2DM, HLD, and s/p harry knee replacement x10 yr ago who presented to ED on 4/13 with a c/o of right hand swelling, redness, and red streaking up his right arm. Pt stated that on 4/12 he was pulling in crab traps when a metal portion of the crab trap punctured his R palmar hand. Pt reported he crabs in [place] and was exposed to salt water. Pt states the crab trap punctured through his gloves into his skin. On 4/13 AM pt states he noticed red streaking that progressed up his arm through the morning which proceeded him to go to the ED. Pt denied fever, chills, or N/V. Reported normal use of R hand/wrist ROM. Stated that the swelling in his arm/wrist/hand has decreased since he has came to the ED. Pt states he has a dog at home. States he has a history of staph infections in the past, usually managed with oral abx with no issues. Pt has remained afebrile throughout admission. Remains with no leukocytosis and inflammatory markers are not elevated. R hand imaging reviewed and no osseous abnormality or retained foreign substance noted.

## 2022-04-14 NOTE — NURSING TRANSFER
Nursing Transfer Note    Pt arrived from the er via stretcher accompanied by transporter.aaox4.resp even and non labored.redness and swelling noted to right hand.redness extending up rfa.safety precautions implemented.bed in low position.rails up x2.call bell in reach.

## 2022-04-14 NOTE — ASSESSMENT & PLAN NOTE
Mr. Saunders is 55 yo M with PMH of T2DM, HLD to ED with worsening redness, swelling, and streaking to the right hand, wrist, arm x 2 days after a puncture wound to right hand from crab trap. Remains afebrile, no leukocytosis, inflammatory markers not elevated, HDS. R hand XR unremarkable, no osseous abnormality or retained foreign substance.       Recommendations:  · transition to oral doxy and Levaquin x7 d once discharged   · Continue local wound care  · Counseled pt to wear sun protective clothing while on doxy as risk for photosensitivity   · Counseled pt to no take Levaquin with dairy products due to decreased absorption

## 2022-04-14 NOTE — PLAN OF CARE
[facility] ([facility]  Discharge Final Note    Primary Care Provider: Alex Mckoy MD    Expected Discharge Date: 4/14/2022     Future Appointments   Date Time Provider Department Center   4/21/2022 10:20 AM Prem Hunt MD [facility]   4/27/2022  1:30 PM LAB, [facility] LAB [facility]   5/4/2022  2:00 PM Lizzy Devi NP [facility]       Pt discharged home with no services.  Hussein Segura, RN,BSN        Final Discharge Note (most recent)     Final Note - 04/14/22 1556        Final Note    Assessment Type Final Discharge Note     Anticipated Discharge Disposition Home or Self Care     Hospital Resources/Appts/Education Provided Provided patient/caregiver with written discharge plan information;Appointments scheduled and added to AVS        Post-Acute Status    Discharge Delays None known at this time                 Important Message from Medicare

## 2022-04-14 NOTE — PLAN OF CARE
Patient in bed resting at this time , easy to arouse. Patient [place], able to make all needs known. Patient is ambulatory, independent, verbalizes no needs, concerns  or pain. Safety measures are in place, bed in lowest position, call bell and personal items are within reach.    Problem: Adult Inpatient Plan of Care  Goal: Plan of Care Review  Outcome: Ongoing, Progressing  Goal: Patient-Specific Goal (Individualized)  Outcome: Ongoing, Progressing  Goal: Optimal Comfort and Wellbeing  Outcome: Ongoing, Progressing  Goal: Readiness for Transition of Care  Outcome: Ongoing, Progressing     Problem: Diabetes Comorbidity  Goal: Blood Glucose Level Within Targeted Range  Outcome: Ongoing, Progressing

## 2022-05-05 NOTE — DISCHARGE SUMMARY
[facility] [address]  [facility] Medicine  Discharge Summary      Patient Name: Cedrick Saunders  MRN: [id number]  Patient Class: OP- Observation  Admission Date: 4/13/2022  Hospital Length of Stay: 0 days  Discharge Date and Time: 4/14/2022  3:50 PM  Attending Physician: No att. providers found   Discharging Provider: Warren Garrett MD  Primary Care Provider: Alex Mckoy MD  Hospital Medicine Team: [facility] Warren Garrett MD    HPI:   Cedrick Saunders is a 56 y.o. M with T2DM, HLD who [facility] [place]13 c/o acute cellulitis to the R hand and arm with proximal tracking.  Yesterday, he was pulling in crab traps when a metal part of a crab trap punctured his R palmar hand.  He reports he was in [employer] working as a  (+salt water exposure). He reports the piece was about 1/2 inch long and he has noticed purulent drainage come from it today.  He lifted his sleeve today and noted that he has tracking up his arm and so decided to come in. He reports normal use of his R hand/wrist and denies fever, chills, chest pain, nausea, numbness, paresthesias, headache. No exacerbating factors. Last tetanus in chart 2013 - given updated tetanus in ED prior to admission.     In the ED, AFVSS, no leukocytosis. Inflammatory markers not elevated, intake labs unremarkable.  XR R hand with no osseous abnormality or retained foreign substance. Given Doxy, levaquin, and cefazolin.       * No surgery found *      Hospital Course:   Patient placed on IV antibiotics and had good response.  ID consulted.   Recommend Levaquin for 7 days further at discharged.  Discharged in stable condition.        Goals of Care Treatment Preferences:  Code Status: Full Code      Consults:   Consults (From admission, onward)        Status Ordering Provider     Inpatient consult to Infectious Diseases  Once        Provider:  (Not yet assigned)    Completed NELIA ACOSTA          No new Assessment & Plan  notes have been filed under this hospital service since the last note was generated.  Service: Hospital Medicine    Final Active Diagnoses:    Diagnosis Date Noted POA    PRINCIPAL PROBLEM:  Cellulitis of right upper extremity [L03.113] 04/13/2022 Unknown    Puncture wound of right hand without foreign body [S61.431A] 04/13/2022 Unknown    Obesity (BMI 30.0-34.9) [E66.9] 07/16/2019 Yes      Problems Resolved During this Admission:       Discharged Condition: stable    Disposition: Home or Self Care    Follow Up:    Patient Instructions:      Diet diabetic     Notify your health care provider if you experience any of the following:  temperature >100.4     Notify your health care provider if you experience any of the following:  persistent nausea and vomiting or diarrhea     Notify your health care provider if you experience any of the following:  severe uncontrolled pain     Notify your health care provider if you experience any of the following:  redness, tenderness, or signs of infection (pain, swelling, redness, odor or green/yellow discharge around incision site)     Notify your health care provider if you experience any of the following:  difficulty breathing or increased cough     Notify your health care provider if you experience any of the following:  severe persistent headache     Notify your health care provider if you experience any of the following:  worsening rash     Notify your health care provider if you experience any of the following:  persistent dizziness, light-headedness, or visual disturbances     Notify your health care provider if you experience any of the following:  increased confusion or weakness     Notify your health care provider if you experience any of the following:     Activity as tolerated       Medications:  Reconciled Home Medications:      Medication List      CHANGE how you take these medications    insulin aspart U-100 100 unit/mL (3 mL) Inpn pen  Commonly known as: NovoLOG  "Flexpen U-100 Insulin  Inject 5-8 units 3 times per day with food, 3 units with snacking. + correction scale. Max TDD of 50 units  What changed: additional instructions     OZEMPIC 1 mg/dose (2 mg/1.5 mL) Pnij  Generic drug: semaglutide  Inject 1 mg into abdomen weekly  What changed:   · how much to take  · how to take this  · when to take this  · additional instructions        CONTINUE taking these medications    ACCU-CHEK GUIDE TEST STRIPS Strp  Generic drug: blood sugar diagnostic  TO USE TO CHECK BLOOD SUGAR 4 TIMES DAILY. TO USE WITH ACCU CHECK GUIDE ME     aspirin 81 MG EC tablet  Commonly known as: ECOTRIN  Take 81 mg by mouth once daily.     atorvastatin 20 MG tablet  Commonly known as: LIPITOR  TAKE 1 TABLET BY MOUTH EVERY DAY IN THE EVENING     DEXCOM G6 SENSOR Leydi  Generic drug: blood-glucose sensor  1 sensor every 10 days     DEXCOM G6 TRANSMITTER Leydi  Generic drug: blood-glucose transmitter  1 transmitter every 3 months     * diclofenac 75 MG EC tablet  Commonly known as: VOLTAREN  Take 1 tablet (75 mg total) by mouth 2 (two) times daily as needed (pain).     * diclofenac sodium 1 % Gel  Commonly known as: VOLTAREN  Apply 2 g topically 2 (two) times daily as needed (shoulder pain).     EScitalopram oxalate 10 MG tablet  Commonly known as: LEXAPRO  Take 1 tablet (10 mg total) by mouth once daily.     FARXIGA 10 mg tablet  Generic drug: dapagliflozin  Take 1 tablet (10 mg total) by mouth once daily.     ibuprofen 200 MG tablet  Commonly known as: ADVIL,MOTRIN  Take 800 mg by mouth daily as needed for Pain.     insulin syringe-needle U-100 1/2 mL 31 gauge x 15/64" Syrg  To use 4 times per day with insulin injections     lisinopriL-hydrochlorothiazide 20-12.5 mg per tablet  Commonly known as: PRINZIDE,ZESTORETIC  TAKE 1 TABLET BY MOUTH EVERY DAY     pen needle, diabetic 32 gauge x 5/32" Ndle  Commonly known as: BD ULTRA-FINE YAMILETH PEN NEEDLE  To use with insulin 4 times per day     scopolamine 1.3-1.5 mg " (1 mg over 3 days)  Commonly known as: TRANSDERM-SCOP  Place 1 patch onto the skin every 72 hours.     TRESIBA FLEXTOUCH U-200 200 unit/mL (3 mL) insulin pen  Generic drug: insulin degludec  Inject 42 Units into the skin once daily.         * This list has 2 medication(s) that are the same as other medications prescribed for you. Read the directions carefully, and ask your doctor or other care provider to review them with you.            STOP taking these medications    metFORMIN 1000 MG tablet  Commonly known as: GLUCOPHAGE        ASK your doctor about these medications    doxycycline 100 MG Cap  Commonly known as: VIBRAMYCIN  Take 1 capsule (100 mg total) by mouth 2 (two) times daily. for 7 days  Ask about: Should I take this medication?     levoFLOXacin 750 MG tablet  Commonly known as: LEVAQUIN  Take 1 tablet (750 mg total) by mouth once daily. for 7 days  Ask about: Should I take this medication?            Indwelling Lines/Drains at time of discharge:   Lines/Drains/Airways     Airway  Duration                Airway - Non-Surgical 05/17/18 0945 Nasal Cannula 1448 days                Time spent on the discharge of patient: 35 minutes         Warren Garrett MD  Department of Hospital Medicine  [facility] ([place]

## 2022-05-05 NOTE — HOSPITAL COURSE
Patient placed on IV antibiotics and had good response.  ID consulted.   Recommend Levaquin for 7 days further at discharged.  Discharged in stable condition.

## 2022-05-31 ENCOUNTER — PATIENT MESSAGE (OUTPATIENT)
Dept: ADMINISTRATIVE | Facility: HOSPITAL | Age: 57
End: 2022-05-31
Payer: COMMERCIAL

## 2022-06-06 ENCOUNTER — PATIENT OUTREACH (OUTPATIENT)
Dept: ADMINISTRATIVE | Facility: HOSPITAL | Age: 57
End: 2022-06-06
Payer: COMMERCIAL

## 2022-06-06 NOTE — PROGRESS NOTES
Health Maintenance Due   Topic Date Due    Shingles Vaccine (1 of 2) Never done    Eye Exam  07/26/2020    PROSTATE-SPECIFIC ANTIGEN  10/08/2021    Lipid Panel  01/11/2022    COVID-19 Vaccine (4 - Booster for Pfizer series) 05/14/2022    Foot Exam  07/26/2022     Updates were requested from care everywhere.  Chart was reviewed for overdue Proactive [facility] Encounters ([facility]) topics (CRS, Breast Cancer Screening, Eye exam)  Health Maintenance has been updated.  LINKS immunization registry triggered.  Immunizations were reconciled.

## 2022-06-21 ENCOUNTER — TELEPHONE (OUTPATIENT)
Dept: DIABETES | Facility: CLINIC | Age: 57
End: 2022-06-21
Payer: COMMERCIAL

## 2022-06-21 ENCOUNTER — CLINICAL SUPPORT (OUTPATIENT)
Dept: DIABETES | Facility: CLINIC | Age: 57
End: 2022-06-21
Payer: COMMERCIAL

## 2022-06-21 ENCOUNTER — OFFICE VISIT (OUTPATIENT)
Dept: DIABETES | Facility: CLINIC | Age: 57
End: 2022-06-21
Payer: COMMERCIAL

## 2022-06-21 VITALS
BODY MASS INDEX: 31.83 KG/M2 | WEIGHT: 235 LBS | DIASTOLIC BLOOD PRESSURE: 80 MMHG | TEMPERATURE: 98 F | SYSTOLIC BLOOD PRESSURE: 120 MMHG | HEART RATE: 73 BPM | HEIGHT: 72 IN | OXYGEN SATURATION: 97 %

## 2022-06-21 DIAGNOSIS — Z79.4 TYPE 2 DIABETES MELLITUS WITH HYPERGLYCEMIA, WITH LONG-TERM CURRENT USE OF INSULIN: Chronic | ICD-10-CM

## 2022-06-21 DIAGNOSIS — E11.65 TYPE 2 DIABETES MELLITUS WITH HYPERGLYCEMIA, WITH LONG-TERM CURRENT USE OF INSULIN: Primary | Chronic | ICD-10-CM

## 2022-06-21 DIAGNOSIS — Z91.148 NONCOMPLIANCE W/MEDICATION TREATMENT DUE TO INTERMIT USE OF MEDICATION: ICD-10-CM

## 2022-06-21 DIAGNOSIS — E78.2 MIXED HYPERLIPIDEMIA: ICD-10-CM

## 2022-06-21 DIAGNOSIS — E66.9 OBESITY (BMI 30.0-34.9): ICD-10-CM

## 2022-06-21 DIAGNOSIS — Z79.4 TYPE 2 DIABETES MELLITUS WITH HYPERGLYCEMIA, WITH LONG-TERM CURRENT USE OF INSULIN: Primary | Chronic | ICD-10-CM

## 2022-06-21 DIAGNOSIS — E11.65 TYPE 2 DIABETES MELLITUS WITH HYPERGLYCEMIA, WITH LONG-TERM CURRENT USE OF INSULIN: Chronic | ICD-10-CM

## 2022-06-21 DIAGNOSIS — Z71.9 HEALTH EDUCATION/COUNSELING: ICD-10-CM

## 2022-06-21 DIAGNOSIS — I10 ESSENTIAL HYPERTENSION: ICD-10-CM

## 2022-06-21 DIAGNOSIS — Z91.199 NONCOMPLIANCE WITH DIABETES TREATMENT: ICD-10-CM

## 2022-06-21 DIAGNOSIS — E11.69 DM TYPE 2 WITH DIABETIC MIXED HYPERLIPIDEMIA: ICD-10-CM

## 2022-06-21 DIAGNOSIS — E78.2 DM TYPE 2 WITH DIABETIC MIXED HYPERLIPIDEMIA: ICD-10-CM

## 2022-06-21 DIAGNOSIS — Z59.9 FINANCIAL DIFFICULTIES: ICD-10-CM

## 2022-06-21 PROCEDURE — 4010F ACE/ARB THERAPY RXD/TAKEN: CPT | Mod: CPTII,S$GLB,, | Performed by: NURSE PRACTITIONER

## 2022-06-21 PROCEDURE — 99999 PR PBB SHADOW E&M-EST. PATIENT-LVL V: CPT | Mod: PBBFAC,,, | Performed by: NURSE PRACTITIONER

## 2022-06-21 PROCEDURE — 4010F PR ACE/ARB THEARPY RXD/TAKEN: ICD-10-PCS | Mod: CPTII,S$GLB,, | Performed by: NURSE PRACTITIONER

## 2022-06-21 PROCEDURE — G0108 DIAB MANAGE TRN  PER INDIV: HCPCS | Mod: S$GLB,,, | Performed by: DIETITIAN, REGISTERED

## 2022-06-21 PROCEDURE — 3079F PR MOST RECENT DIASTOLIC BLOOD PRESSURE 80-89 MM HG: ICD-10-PCS | Mod: CPTII,S$GLB,, | Performed by: NURSE PRACTITIONER

## 2022-06-21 PROCEDURE — 3052F PR MOST RECENT HEMOGLOBIN A1C LEVEL 8.0 - < 9.0%: ICD-10-PCS | Mod: CPTII,S$GLB,, | Performed by: NURSE PRACTITIONER

## 2022-06-21 PROCEDURE — 99999 PR PBB SHADOW E&M-EST. PATIENT-LVL I: ICD-10-PCS | Mod: PBBFAC,,, | Performed by: DIETITIAN, REGISTERED

## 2022-06-21 PROCEDURE — 1160F RVW MEDS BY RX/DR IN RCRD: CPT | Mod: CPTII,S$GLB,, | Performed by: NURSE PRACTITIONER

## 2022-06-21 PROCEDURE — 1160F PR REVIEW ALL MEDS BY PRESCRIBER/CLIN PHARMACIST DOCUMENTED: ICD-10-PCS | Mod: CPTII,S$GLB,, | Performed by: NURSE PRACTITIONER

## 2022-06-21 PROCEDURE — 3074F SYST BP LT 130 MM HG: CPT | Mod: CPTII,S$GLB,, | Performed by: NURSE PRACTITIONER

## 2022-06-21 PROCEDURE — 99999 PR PBB SHADOW E&M-EST. PATIENT-LVL V: ICD-10-PCS | Mod: PBBFAC,,, | Performed by: NURSE PRACTITIONER

## 2022-06-21 PROCEDURE — 3008F PR BODY MASS INDEX (BMI) DOCUMENTED: ICD-10-PCS | Mod: CPTII,S$GLB,, | Performed by: NURSE PRACTITIONER

## 2022-06-21 PROCEDURE — 3079F DIAST BP 80-89 MM HG: CPT | Mod: CPTII,S$GLB,, | Performed by: NURSE PRACTITIONER

## 2022-06-21 PROCEDURE — 99999 PR PBB SHADOW E&M-EST. PATIENT-LVL I: CPT | Mod: PBBFAC,,, | Performed by: DIETITIAN, REGISTERED

## 2022-06-21 PROCEDURE — G0108 PR DIAB MANAGE TRN  PER INDIV: ICD-10-PCS | Mod: S$GLB,,, | Performed by: DIETITIAN, REGISTERED

## 2022-06-21 PROCEDURE — 99214 PR OFFICE/OUTPT VISIT, EST, LEVL IV, 30-39 MIN: ICD-10-PCS | Mod: S$GLB,,, | Performed by: NURSE PRACTITIONER

## 2022-06-21 PROCEDURE — 3008F BODY MASS INDEX DOCD: CPT | Mod: CPTII,S$GLB,, | Performed by: NURSE PRACTITIONER

## 2022-06-21 PROCEDURE — 99214 OFFICE O/P EST MOD 30 MIN: CPT | Mod: S$GLB,,, | Performed by: NURSE PRACTITIONER

## 2022-06-21 PROCEDURE — 3052F HG A1C>EQUAL 8.0%<EQUAL 9.0%: CPT | Mod: CPTII,S$GLB,, | Performed by: NURSE PRACTITIONER

## 2022-06-21 PROCEDURE — 1159F PR MEDICATION LIST DOCUMENTED IN MEDICAL RECORD: ICD-10-PCS | Mod: CPTII,S$GLB,, | Performed by: NURSE PRACTITIONER

## 2022-06-21 PROCEDURE — 1159F MED LIST DOCD IN RCRD: CPT | Mod: CPTII,S$GLB,, | Performed by: NURSE PRACTITIONER

## 2022-06-21 PROCEDURE — 3074F PR MOST RECENT SYSTOLIC BLOOD PRESSURE < 130 MM HG: ICD-10-PCS | Mod: CPTII,S$GLB,, | Performed by: NURSE PRACTITIONER

## 2022-06-21 RX ORDER — SEMAGLUTIDE 2.68 MG/ML
2 INJECTION, SOLUTION SUBCUTANEOUS
Qty: 3 ML | Refills: 6 | Status: SHIPPED | OUTPATIENT
Start: 2022-06-21 | End: 2022-09-12

## 2022-06-21 RX ORDER — PEN NEEDLE, DIABETIC 30 GX3/16"
NEEDLE, DISPOSABLE MISCELLANEOUS
Qty: 150 EACH | Refills: 11 | Status: SHIPPED | OUTPATIENT
Start: 2022-06-21 | End: 2022-10-17 | Stop reason: SDUPTHER

## 2022-06-21 RX ORDER — INSULIN DEGLUDEC 200 U/ML
42 INJECTION, SOLUTION SUBCUTANEOUS DAILY
Qty: 3 PEN | Refills: 6 | Status: SHIPPED | OUTPATIENT
Start: 2022-06-21 | End: 2022-10-17 | Stop reason: SDUPTHER

## 2022-06-21 RX ORDER — INSULIN ASPART INJECTION 100 [IU]/ML
INJECTION, SOLUTION SUBCUTANEOUS
Qty: 10 PEN | Refills: 6 | Status: SHIPPED | OUTPATIENT
Start: 2022-06-21 | End: 2022-06-21

## 2022-06-21 RX ORDER — METFORMIN HYDROCHLORIDE 1000 MG/1
1000 TABLET ORAL 2 TIMES DAILY WITH MEALS
Qty: 180 TABLET | Refills: 1 | Status: SHIPPED | OUTPATIENT
Start: 2022-06-21 | End: 2022-10-17 | Stop reason: SDUPTHER

## 2022-06-21 RX ORDER — FLASH GLUCOSE SENSOR
1 KIT MISCELLANEOUS
Qty: 2 KIT | Refills: 11 | Status: SHIPPED | OUTPATIENT
Start: 2022-06-21 | End: 2023-02-08 | Stop reason: SDUPTHER

## 2022-06-21 RX ORDER — DAPAGLIFLOZIN 10 MG/1
10 TABLET, FILM COATED ORAL DAILY
Qty: 90 TABLET | Refills: 1 | Status: SHIPPED | OUTPATIENT
Start: 2022-06-21 | End: 2022-10-17 | Stop reason: SDUPTHER

## 2022-06-21 RX ORDER — INSULIN ASPART 100 [IU]/ML
INJECTION, SOLUTION INTRAVENOUS; SUBCUTANEOUS
Qty: 30 ML | Refills: 6 | Status: SHIPPED | OUTPATIENT
Start: 2022-06-21 | End: 2022-10-17 | Stop reason: SDUPTHER

## 2022-06-21 SDOH — SOCIAL DETERMINANTS OF HEALTH (SDOH): PROBLEM RELATED TO HOUSING AND ECONOMIC CIRCUMSTANCES, UNSPECIFIED: Z59.9

## 2022-06-21 NOTE — ASSESSMENT & PLAN NOTE
Body mass index is 31.87 kg/m².  Increases insulin resistance.   Discussed DM diet and exercise.   Encouraged  weight loss

## 2022-06-21 NOTE — TELEPHONE ENCOUNTER
Spoke to pharmacy, stated pt's Fiasp was $90  , changed Fiasp back to Novolog inj pen, pharmacy tech verbalized understanding

## 2022-06-21 NOTE — Clinical Note
Please submit CeQur simplicity form to Isaac at [phone] Please make sure that he was able to  his Novolog without issues please

## 2022-06-21 NOTE — PATIENT INSTRUCTIONS
Will try to submit for the CeQur - new prandial insulin device. Form completed and faxed. Was told that it should be $0 co- pay   If you decide to move forward with the CeQur -- we will need to have you meet with norma for teaching!   You will fill it with rapid acting insulin like Fiasp        Continue Metformin 1000 mg 2 times per day      Continue Farxiga 10 mg daily,     Increase Ozempic to 2 mg weekly      Tresiba 42 units daily    Try to change the Novolog to Fiasp    8-12 units with meals        With using correction scale: if above   150-200: +1 unit  201-250: +2 units  251-300: +3 units  301-350: +4 units    Start using the Freestyle zachariah if you can afford it

## 2022-06-21 NOTE — ASSESSMENT & PLAN NOTE
Uncontrolled.    + prandial excursions r/t dietary indiscretions  + noncompliance with  prandial insulin is hindering overall management  Sounds like cost is also an issue?  May have a high pharmacy deductible?  But does not make sense because other medications are affordable it just is the NovoLog pens reportedly?  Much better controlled on VGo- but insurance will not cover   He is unable to afford a personal CGM- such as Dexcom - which is unfortunate as his blood sugar readings were much better controlled when he was on a personal C GM.  Will see if the freestyle Maria De Jesus is covered or more affordable for patient  Today we placed a freestyle Maria De Jesus 2 sample on patient and had him uses phone as the         Medication changes:     Will try to submit for the CeQur - new prandial insulin device. Form completed and faxed. Was told that it should be $0 co- pay   If you decide to move forward with the CeQur -- we will need to have you meet with norma for teaching!   You will fill it with rapid acting insulin like Novolog in the Vial        Continue Metformin 1000 mg 2 times per day      Continue Farxiga 10 mg daily,     Increase Ozempic to 2 mg weekly      Tresiba 42 units daily      Try to change the Novolog to Fiasp -- addendum--we called the pharmacy in the NovoLog pens should be free ( $0) but the Fiasp would cost $90    8-12 units with meals        With using correction scale: if above   150-200: +1 unit  201-250: +2 units  251-300: +3 units  301-350: +4 units    Start using the Freestyle maria de jesus if you can afford it     CeQur simplicity -- 5-6 clicks TID AC   1-2 clicks with snacks       Reviewed insulin administration at length        -- Reviewed goals of therapy are to get the best control we can without hypoglycemia  -- Reviewed patient's current insulin regimen. Clarified proper insulin dose and timing in relation to meals, etc. Insulin injection sites and proper rotation instructed.    -- Advised frequent  self blood glucose monitoring.  Patient encouraged to document glucose results and bring them to every clinic visit -   Discussed the importance of SBGM. He does better with a personal CGM. Cannot afford dexcom   Will submit for the freestyle zachariah 2 to the pharmacy  To check coverage and cost. Placed freestyle zachariah sample today   -- Hypoglycemia precautions discussed. Instructed on precautions before driving.    -- Call for Bg repeatedly < 70 or > 180.   -- Close adherence to lifestyle changes recommended.   -- Periodic follow ups for eye evaluations, foot care and dental care suggested.  -- diabetes education if able to start the CeQur simplicity device

## 2022-06-21 NOTE — PROGRESS NOTES
CC:   Chief Complaint   Patient presents with    Diabetes Mellitus       HPI: Cedrick Saunders is a 56 y.o. male presents for a follow up visit today for the management of T2DM.     He was diagnosed with Type 2 diabetes at age 37 with s/s of polyuria, nocturia, and polydipsia and BG was 600. A1c at diagnosis was > 12%. He was initially on oral medications Actos and Metformin. Insulin therapy was started in 2016.     Family hx of diabetes: father, paternal grandmother   Hospitalized for diabetes: denies     No personal or FH of thyroid cancer or personal of pancreatic cancer or pancreatitis.     Our last visit was in November 2021   At that visit we discussed the CeQur simplicity. -- he deferred trying the CeQur -- I was told that he has a $0 co-pay for the CeQur simplicity device.   Cost is an issue with his diabetes management cannot afford the dexcom   Suffers with insulin noncompliance.  Frequently missing his prandial insulin doses  For father's day - he had 6-7 cookies and didn't take any insulin.  His blood sugar went up to 350  His noncompliance is hindering his overall management  He reports that the NovoLog was going to cost him over $250 dollars in the pens   We called the pharmacy today and they claim that NovoLog pens would cost him $ 0 dollars?  The Fiasp would cost him $90       DIABETES COMPLICATIONS: none      Diabetes Management Status    ASA:  Yes - 81 mg daily     Statin: Taking- Lipitor 20 mg   ACE/ARB: Taking- Lisinopril 20 mg     The ASCVD Risk score (Courtlandisiah EAST Jr., et al., 2013) failed to calculate for the following reasons:    The valid total cholesterol range is 130 to 320 mg/dL      Screening or Prevention Patient's value Goal Complete/Controlled?   HgA1C Testing and Control   Lab Results   Component Value Date    HGBA1C 8.0 (H) 06/10/2022      Annually/Less than 8% No   Lipid profile : 06/10/2022 Annually Yes   LDL control Lab Results   Component Value Date    LDLCALC 61.0 (L)  06/10/2022    Annually/Less than 100 mg/dl  Yes   Nephropathy screening Lab Results   Component Value Date    LABMICR <5.0 11/16/2021     Lab Results   Component Value Date    PROTEINUA Trace (A) 05/19/2006    Annually Yes   Blood pressure BP Readings from Last 1 Encounters:   06/21/22 120/80    Less than 140/90 Yes   Dilated retinal exam : 07/26/2019- Annually No   Foot exam   : 07/26/2021 Annually Yes       CURRENT A1C:    Hemoglobin A1C   Date Value Ref Range Status   06/10/2022 8.0 (H) 4.0 - 5.6 % Final     Comment:     ADA Screening Guidelines:  5.7-6.4%  Consistent with prediabetes  >or=6.5%  Consistent with diabetes    High levels of fetal hemoglobin interfere with the HbA1C  assay. Heterozygous hemoglobin variants (HbS, HgC, etc)do  not significantly interfere with this assay.   However, presence of multiple variants may affect accuracy.     04/13/2022 8.2 (H) 4.0 - 5.6 % Final     Comment:     ADA Screening Guidelines:  5.7-6.4%  Consistent with prediabetes  >or=6.5%  Consistent with diabetes    High levels of fetal hemoglobin interfere with the HbA1C  assay. Heterozygous hemoglobin variants (HbS, HgC, etc)do  not significantly interfere with this assay.   However, presence of multiple variants may affect accuracy.     11/16/2021 7.9 (H) 4.0 - 5.6 % Final     Comment:     ADA Screening Guidelines:  5.7-6.4%  Consistent with prediabetes  >or=6.5%  Consistent with diabetes    High levels of fetal hemoglobin interfere with the HbA1C  assay. Heterozygous hemoglobin variants (HbS, HgC, etc)do  not significantly interfere with this assay.   However, presence of multiple variants may affect accuracy.         GOAL A1C: 7% or less without hypoglycemia.         DM MEDICATIONS USED IN THE PAST: Metformin, Januvia, Basaglar, Farxiga, Glyburide, Actos   Invokana- yeast infections, Tresiba, Ozempic   VGo  Novolog   Dexcom -- off due to cost         CURRENT DIABETES MEDICATIONS: Metformin 1000 mg BID, Farxiga 10 mg daily,  Ozempic 1 mg weekly (Fridays), Tresiba 42 units daily and Novolog 18-20 units with meals sometimes ( red beans and rice)   Insulin: Novolog vials- Tresiba pens   Missed doses:  Missing insulin doses with food typically   A lot of time taking his meal time insulin after he eats instead of before   occ has forgotten the Ozempic         BLOOD GLUCOSE MONITORING:  Not consistently checking his BG   134 fasting this AM   117 before eating watermelon -- then BG went up to 182          HYPOGLYCEMIA:  Rarely   Once to 68       MEALS: eating 3 meals per day   + dietary indiscretions  Rice, pasta, bread     Drinks: 1 diet coke per day.   No regular cokes   Diet green tea or water.   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   HENT: Negative for trouble swallowing.    Eyes: Negative for visual disturbance.   Respiratory: Negative for shortness of breath.    Cardiovascular: Negative for chest pain.   Gastrointestinal: Negative for nausea.   Endocrine: Negative for polydipsia, polyphagia and polyuria.   Genitourinary:        No Nocturia    Musculoskeletal: Positive for arthralgias and neck pain.   Skin: Negative for rash and wound.   Neurological: Negative for numbness.   Psychiatric/Behavioral: Negative for dysphoric mood.       Physical Exam   Physical Exam  Vitals and nursing note reviewed.   Constitutional:       Appearance: He is well-developed. He is obese.   HENT:      Head: Normocephalic and atraumatic.      Right Ear: External ear normal.      Left Ear: External ear normal.      Nose: Nose normal.   Neck:      Thyroid: No thyromegaly.      Trachea: No tracheal deviation.   Cardiovascular:      Rate and Rhythm: Normal rate and regular rhythm.      Heart sounds: No murmur heard.  Pulmonary:      Effort: Pulmonary effort is  normal. No respiratory distress.      Breath sounds: Normal breath sounds.   Abdominal:      Palpations: Abdomen is soft.      Tenderness: There is no abdominal tenderness.      Hernia: No hernia is present.   Musculoskeletal:      Cervical back: Normal range of motion and neck supple.   Skin:     General: Skin is warm and dry.      Capillary Refill: Capillary refill takes less than 2 seconds.      Findings: No rash.      Comments: injection sites with mild skin irritation. No lipo hypertropthy or atrophy   Neurological:      Mental Status: He is alert and oriented to person, place, and time.      Cranial Nerves: No cranial nerve deficit.   Psychiatric:         Mood and Affect: Mood is not depressed. Affect is not tearful.         Behavior: Behavior normal.         Judgment: Judgment normal.         FOOT EXAMINATION: Appropriate footwear.         Lab Results   Component Value Date    TSH 1.970 06/10/2022           Type 2 diabetes mellitus with hyperglycemia, with long-term current use of insulin  Uncontrolled.    + prandial excursions r/t dietary indiscretions  + noncompliance with  prandial insulin is hindering overall management  Sounds like cost is also an issue?  May have a high pharmacy deductible?  But does not make sense because other medications are affordable it just is the NovoLog pens reportedly?  Much better controlled on VGo- but insurance will not cover   He is unable to afford a personal CGM- such as Dexcom - which is unfortunate as his blood sugar readings were much better controlled when he was on a personal C GM.  Will see if the freestyle Maria De Jesus is covered or more affordable for patient  Today we placed a freestyle Maria De Jesus 2 sample on patient and had him uses phone as the         Medication changes:     Will try to submit for the CeQur - new prandial insulin device. Form completed and faxed. Was told that it should be $0 co- pay   If you decide to move forward with the CeQur -- we will need  to have you meet with norma for teaching!   You will fill it with rapid acting insulin like Novolog in the Vial        Continue Metformin 1000 mg 2 times per day      Continue Farxiga 10 mg daily,     Increase Ozempic to 2 mg weekly      Tresiba 42 units daily      Try to change the Novolog to Fiasp -- addendum--we called the pharmacy in the NovoLog pens should be free ( $0) but the Fiasp would cost $90    8-12 units with meals        With using correction scale: if above   150-200: +1 unit  201-250: +2 units  251-300: +3 units  301-350: +4 units    Start using the Freestyle zachariah if you can afford it     CeQur simplicity -- 5-6 clicks TID AC   1-2 clicks with snacks       Reviewed insulin administration at length        -- Reviewed goals of therapy are to get the best control we can without hypoglycemia  -- Reviewed patient's current insulin regimen. Clarified proper insulin dose and timing in relation to meals, etc. Insulin injection sites and proper rotation instructed.    -- Advised frequent self blood glucose monitoring.  Patient encouraged to document glucose results and bring them to every clinic visit -   Discussed the importance of SBGM. He does better with a personal CGM. Cannot afford dexcom   Will submit for the freestyle zachariah 2 to the pharmacy  To check coverage and cost. Placed freestyle zachariah sample today   -- Hypoglycemia precautions discussed. Instructed on precautions before driving.    -- Call for Bg repeatedly < 70 or > 180.   -- Close adherence to lifestyle changes recommended.   -- Periodic follow ups for eye evaluations, foot care and dental care suggested.  -- diabetes education if able to start the CeQur simplicity device          Essential hypertension  BP goal is < 140/90.   Tolerating ACEi  Controlled   Blood pressure goals discussed with patient      Mixed hyperlipidemia  On statin per ADA recommendations  LDL goal < 100. LDL at goal. LFTs WNL. Continue statin.       Obesity (BMI  30.0-34.9)  Body mass index is 31.87 kg/m².  Increases insulin resistance.   Discussed DM diet and exercise.   Encouraged  weight loss          Spent 25 minutes with patient with > 50% time spent in counseling, as noted above on medications, diet, compliance         Follow up in about 4 months (around 10/21/2022).  [facility]   Please help with freestyle zachariah sample   Resubmit the CeQur simplicity form to check coverage please   Follow up with me in 4 months with labs prior         Orders Placed This Encounter   Procedures    Hemoglobin A1C     Standing Status:   Future     Standing Expiration Date:   12/21/2023    Basic Metabolic Panel     Standing Status:   Future     Standing Expiration Date:   12/21/2023    Microalbumin/Creatinine Ratio, Urine     Standing Status:   Future     Standing Expiration Date:   12/21/2023     Order Specific Question:   Specimen Source     Answer:   Urine    Ambulatory referral/consult to Diabetes Education     Standing Status:   Future     Number of Occurrences:   1     Standing Expiration Date:   12/21/2023     Referral Priority:   Routine     Referral Type:   Consultation     Referral Reason:   Specialty Services Required     Referred to Provider:   Kathy Watkins RD, CDE     Requested Specialty:   Diabetes     Number of Visits Requested:   1       Recommendations were discussed with the patient in detail  The patient verbalized understanding and agrees with the plan outlined as above.

## 2022-06-22 ENCOUNTER — TELEPHONE (OUTPATIENT)
Dept: DIABETES | Facility: CLINIC | Age: 57
End: 2022-06-22
Payer: COMMERCIAL

## 2022-06-22 ENCOUNTER — PATIENT MESSAGE (OUTPATIENT)
Dept: DIABETES | Facility: CLINIC | Age: 57
End: 2022-06-22
Payer: COMMERCIAL

## 2022-06-22 NOTE — TELEPHONE ENCOUNTER
Called pharmacy to check to see if pt picked up Novolog medication, no answer, Pharmacy was closed

## 2022-06-23 NOTE — PROGRESS NOTES
Diabetes Care Specialist Progress Note  Author: Kathy Watkins RD, CDE  Date: 6/23/2022    Program Intake  Reason for Diabetes Program Visit:: Initial Diabetes Assessment  Type of Intervention:: Individual  Individual: Device Training  Device Training: Personal CGM (Freestyle Maria De Jesus sensor)  Current diabetes risk level:: moderate (HgbA1c 8.0)  In the last 12 months, have you:: none  Permission to speak with others about care:: no    Lab Results   Component Value Date    HGBA1C 8.0 (H) 06/10/2022       Clinical    Patient Health Rating  Compared to other people your age, how would you rate your health?: Good    Problem Review  Reviewed Problem List with Patient: yes  Active comorbidities affecting diabetes self-care.: no  Reviewed health maintenance: yes    Clinical Assessment  Current Diabetes Treatment: Oral Medication, Injectable, Insulin  Have you ever experienced hypoglycemia (low blood sugar)?: yes  In the last month, how often have you experienced low blood sugar?: other (see comments) (rarely, oncwe went to 66)  Are you able to tell when your blood sugar is low?: Yes  What symptoms do you experience?: Shaky, Anxious/nervous  Have you ever been hospitalized because your blood sugar was too low?: no  How do you treat hypoglycemia (low blood sugar)?: 1/2 can soda/fruit juice  Have you ever experienced hyperglycemia (high blood sugar)?: no    Medication Information  How do you obtain your medications?: Patient drives, Family picks up  How many days a week do you miss your medications?: 3 or more  Do you use a pill box or medication chart to help you manage your medications?: No  Do you sometimes have difficulty refilling your medications?: Yes (see comment)  Medication adherence impacting ability to self-manage diabetes?: Yes    Labs  Do you have regular lab work to monitor your medications?: Yes  Type of Regular Lab Work: A1c, Cholesterol, Microalbumin, CBC, BMP  Where do you get your labs drawn?: [facility]  Lab  Compliance Barriers: No    Nutritional Status  Diet: Regular  Meal Plan 24 Hour Recall: Breakfast, Lunch, Dinner, Snack  Meal Plan 24 Hour Recall - Breakfast: Eggs, toast, bagel, something like that  Meal Plan 24 Hour Recall - Lunch: rice, pasta, bread, meat  Meal Plan 24 Hour Recall - Dinner: rice, pasta, bread, meat  Meal Plan 24 Hour Recall - Snack: mixed nuts, cookies, Drinks:1 diet coke per day, No regular cokes, Diet green tea or water, SF vitamin water  Change in appetite?: No  Dentation:: Intact  Recent Changes in Weight: No Recent Weight Change  Current nutritional status an area of need that is impacting patient's ability to self-manage diabetes?: Yes    Additional Social History    Support  Does anyone support you with your diabetes care?: yes  Who supports you?: son/daughter, spouse, self  Who takes you to your medical appointments?: self  Does the current support meet the patient's needs?: Yes  Is Support an area impacting ability to self-manage diabetes?: No    Access to Mass Media & Technology  Does the patient have access to any of the following devices or technologies?: Smart phone, Internet Access  Media or technology needs impacting ability to self-manage diabetes?: No    Cognitive/Behavioral Health  Alert and Oriented: Yes  Difficulty Thinking: No  Requires Prompting: No  Requires assistance for routine expression?: No  Cognitive or behavioral barriers impacting ability to self-manage diabetes?: No    Culture/Amish  Culture or Zoroastrianism beliefs that may impact ability to access healthcare: No    Communication  Language preference: English  Hearing Problems: Yes  Hearing Assistance: Hearing aid  Vision Problems: Yes  Vision problem type:: Decreased Vision  Communication needs impacting ability to self-manage diabetes?: No    Health Literacy  Preferred Learning Method: Face to Face, Demonstration, Hands On  How often do you need to have someone help you read instructions, pamphlets, or written  material from your doctor or pharmacy?: Never  Health literacy needs impacting ability to self-manage diabetes?: No      Diabetes Self-Management Skills Assessment    Diabetes Disease Process/Treatment Options  Patient/caregiver able to state what happens when someone has diabetes.: yes  Patient/caregiver knows what type of diabetes they have.: yes  Diabetes Type : Type II  Patient/caregiver able to identify at least three signs and symptoms of diabetes.: yes  Identified signs and symptoms:: frequent urination, increased thirst  Patient able to identify at least three risk factors for diabetes.: yes  Identified risk factors:: being overweight, family history  Diabetes Disease Process/Treatment Options: Skills Assessment Completed: Yes  Assessment indicates:: Adequate understanding  Area of need?: No    Nutrition/Healthy Eating  Challenges to healthy eating:: portion control, snacking between meals and at night, lack of will power  Method of carbohydrate measurement:: no method  Patient can identify foods that impact blood sugar.: yes  Patient-identified foods:: fruit/fruit juice, starches (bread, pasta, rice, cereal), milk, soda, starchy vegetables (corn, peas, beans), sweets, yogurt  Nutrition/Healthy Eating Skills Assessment Completed:: Yes  Assessment indicates:: Other (comment) (reinforcement)  Area of need?: Yes    Physical Activity/Exercise  Patient's daily activity level:: moderately active  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)  Reasons for not exercising:: time constraints, work schedule  Patient can identify forms of physical activity.: yes  Stated forms of physical activity:: manual activity at work  Patient can identify reasons why exercise/physical activity is important in diabetes management.: yes  Identified reasons:: lowers blood glucose, blood pressure, and cholesterol  Physical Activity/Exercise Skills Assessment Completed: : Yes  Assessment indicates:: Adequate  understanding  Area of need?: No    Medications  Patient is able to describe current diabetes management routine.: yes  Diabetes management routine:: injectable medications, insulin, oral medications  Patient is able to identify current diabetes medications, dosages, and appropriate timing of medications.: yes  Patient understands the purpose of the medications taken for diabetes.: yes  Patient reports problems or concerns with current medication regimen.: yes  Medication regimen problems/concerns:: financial concerns  Medication Skills Assessment Completed:: Yes  Assessment indicates:: Other (comment) (reinforcement)  Area of need?: Yes    Home Blood Glucose Monitoring  Patient states that blood sugar is checked at home daily.: no  Reasons for not monitoring:: finger pain, other (see comments)  Home Blood Glucose Monitoring Skills Assessment Completed: : Yes  Assessment indicates:: Other (comment), Knowledge deficit (reinforcement)  Area of need?: Yes    Acute Complications  Patient is able to identify types of acute complications: Yes  Patient Identified:: Hypoglycemia, Hyperglycemia  Patient is able to state the basic meaning of hypoglycemia?: Yes  Able to state the blood sugar range for hypoglycemia?: yes  Patient stated range:: <70  Patient can identify general symptoms of hypoglycemia: yes  Patient identified:: shakiness  Able to state proper treatment of hypoglycemia?: yes  Patient identified:: 1/2 can soda/fruit juice  Patient is able to state the basic meaning of hyperglycemia?: Yes  Able to state the blood sugar range for hyperglycemia?: yes  Patient stated range:: >250  Patient able to state proper treatment of hyperglycemia?: yes  Patient identified:: take medication as recommended  Patient able to verbalize sick day plan?: no  Acute Complications Skills Assessment Completed: : Yes  Assessment indicates:: Instruction Needed, Other (comment) (reinforcement)  Area of need?: Yes    Chronic  Complications  Patient can identify major chronic complications of diabetes.: yes  Stated chronic complications:: heart disease/heart attack, sexual dysfunction, stroke, kidney disease, neuropathy/nerve damage, retinopathy  Patient can identify ways to prevent or delay diabetes complications.: yes  Stated ways to prevent complications:: controlling blood sugar  Patient is aware that having diabetes increases risk of heart disease?: Yes  Patient is aware that heart disease is the leading cause of death and disability in people with diabetes?: Yes  Patient able to state risk factors for heart disease?: Yes  Patient stated risk factors for heart disease:: High blood pressure, High cholesterol, Having diabetes, Medication non-adherance  Patient is taking statin?: Yes  Chronic Complications Skills Assessment Completed: : Yes  Assessment indicates:: Adequate understanding, Other (comment) (reinforcement)  Area of need?: Yes    Psychosocial/Coping  Patient can identify ways of coping with chronic disease.: yes  Patient-stated ways of coping with chronic disease:: spiritual/Evangelical practices, support from loved ones  Psychosocial/Coping Skills Assessment Completed: : Yes  Assessment indicates:: Adequate understanding  Area of need?: No      Diabetes Self Support Plan    Diabetes Self-Management Support Plan  Stress management:: family, Anglican  Medication:: pharmacy  Review status:: Patient has selected and agrees to support plan., Patient was provided Diabetes Self-Management Support Plan document that includes support options.    Assessment Summary and Plan    Based on today's diabetes care assessment, the following areas of need were identified:      Social 6/21/2022   Support No   Access to Mass Media/Tech No   Cognitive/Behavioral Health No   Culture/Adventism No   Communication No   Health Literacy No        Clinical 6/21/2022   Medication Adherence Yes   Lab Compliance No   Nutritional Status Yes        Diabetes  Self-Management Skills 6/21/2022   Diabetes Disease Process/Treatment Options No   Nutrition/Healthy Eating Yes   Physical Activity/Exercise No   Medication Yes   Home Blood Glucose Monitoring Yes   Acute Complications Yes   Chronic Complications Yes   Psychosocial/Coping No          Today's interventions were provided through individual discussion, instruction, and written materials were provided.      Patient verbalized understanding of instruction and written materials.  Pt was able to return back demonstration of instructions today. Patient understood key points, needs reinforcement and further instruction.     Diabetes Self-Management Care Plan:    Today's Diabetes Self-Management Care Plan was developed with Cedrick's input. Cedrick has agreed to work toward the following goal(s) to improve his/her overall diabetes control.      Care Plan: Diabetes Management   Updates made since 5/24/2022 12:00 AM      Problem: Healthy Eating       Goal: Eat 3 meals daily with 45-60g/3-4 servings of Carbohydrate per meal.    Start Date: 6/21/2022   Expected End Date: 12/23/2022   This Visit's Progress: Not met   Priority: High   Barriers: Lack of Motivation to Change   Note:    Reviewed carb counting, portion control, importance of spacing meals throughout the day to prevent post prandial elevations.  Recommended low saturated fat, low sodium diet to aid in control of hypertension and cholesterol. Reviewed plate method and portion control, dining out tips, meal planning, reading a food label, healthy snack options, benefits of physical activity       Task: Reviewed the sources and role of Carbohydrate, Protein, and Fat and how each nutrient impacts blood sugar. Completed 6/23/2022      Task: Provided visual examples using dry measuring cups, food models, and other familiar objects such as computer mouse, deck or cards, tennis ball etc. to help with visualization of portions. Completed 6/23/2022      Task: Explained how to  count carbohydrates using the food label and the use of dry measuring cups for accurate carb counting.       Task: Discussed strategies for choosing healthier menu options when dining out.       Task: Recommended replacing beverages containing high sugar content with noncaloric/sugar free options and/or water.       Task: Review the importance of balancing carbohydrates with each meal using portion control techniques to count servings of carbohydrate and label reading to identify serving size and amount of total carbs per serving.       Task: Provided Sample plate method and reviewed the use of the plate to estimate amounts of carbohydrate per meal.       Problem: Blood Glucose Self-Monitoring       Goal: Patient agrees to check and record blood sugars 4 times per day with the libre2.    Start Date: 6/21/2022   Expected End Date: 9/23/2022   This Visit's Progress: Not met   Priority: Medium   Barriers: Lack of Supplies   Note:    Freestyle Maria De Jesus Education  Patient is here in clinic today for initial start of Freestyle Maria De Jesus continuous glucose monitoring system (CGMA). Reviewed with patient how to insert sensor. Patient inserted sensor on left arm tricep region. Hypoglycemia trigger set for 70 and hyperglycemia set for 180. Patient provided with phone number for 24-hour help line if needed. Down loaded Maria De Jesus view lena. Patient will use his phone to scan sensor and get readings. Patient accepted the invitation to share data with our clinic and will follow-up in 2 weeks. Questions addressed. Initialization finished. No futher questions.       Task: Provided patient with a sensor today and sent Rx request to provider to send to patients pharmacy. Completed 6/23/2022      Task: Reviewed the importance of self-monitoring blood glucose and keeping logs. Completed 6/23/2022      Task: Instructed on how to self-monitor blood glucose using a Freestyle Libre2, how to properly dispose of used sensors after use, and how to  appropriately store supplies. Completed 6/23/2022      Task: Provided patient with blood glucose logs, reviewed appropriate timing and frequency to SMBG, education on parameters on when to notify provider and advised patient to bring logs to all appts with PCP/Endocrinologist/Diabetes Care Specialist. Completed 6/23/2022      Task: Discussed ways to minimize pain when monitoring blood glucose. Completed 6/23/2022          Follow Up Plan     Follow up in about 3 months (around 9/21/2022) for Personal CGM Upload.    Today's care plan and follow up schedule was discussed with patient.  Cedrick verbalized understanding of the care plan, goals, and agrees to follow up plan.        The patient was encouraged to communicate with his/her health care provider/physician and care team regarding his/her condition(s) and treatment.  I provided the patient with my contact information today and encouraged to contact me via phone or [facility] Patient Portal as needed.     Length of Visit   Total Time: 45 Minutes

## 2022-06-24 ENCOUNTER — PATIENT MESSAGE (OUTPATIENT)
Dept: DIABETES | Facility: CLINIC | Age: 57
End: 2022-06-24
Payer: COMMERCIAL

## 2022-09-12 DIAGNOSIS — Z79.4 TYPE 2 DIABETES MELLITUS WITH HYPERGLYCEMIA, WITH LONG-TERM CURRENT USE OF INSULIN: Chronic | ICD-10-CM

## 2022-09-12 DIAGNOSIS — E11.65 TYPE 2 DIABETES MELLITUS WITH HYPERGLYCEMIA, WITH LONG-TERM CURRENT USE OF INSULIN: Chronic | ICD-10-CM

## 2022-09-12 RX ORDER — SEMAGLUTIDE 2.68 MG/ML
2 INJECTION, SOLUTION SUBCUTANEOUS
Qty: 9 ML | Refills: 0 | Status: SHIPPED | OUTPATIENT
Start: 2022-09-12 | End: 2022-10-17 | Stop reason: SDUPTHER

## 2022-10-12 ENCOUNTER — PATIENT MESSAGE (OUTPATIENT)
Dept: DIABETES | Facility: CLINIC | Age: 57
End: 2022-10-12
Payer: COMMERCIAL

## 2022-10-14 ENCOUNTER — TELEPHONE (OUTPATIENT)
Dept: DIABETES | Facility: CLINIC | Age: 57
End: 2022-10-14
Payer: COMMERCIAL

## 2022-10-17 ENCOUNTER — OFFICE VISIT (OUTPATIENT)
Dept: DIABETES | Facility: CLINIC | Age: 57
End: 2022-10-17
Payer: COMMERCIAL

## 2022-10-17 VITALS
SYSTOLIC BLOOD PRESSURE: 110 MMHG | HEART RATE: 79 BPM | WEIGHT: 232 LBS | BODY MASS INDEX: 31.42 KG/M2 | HEIGHT: 72 IN | DIASTOLIC BLOOD PRESSURE: 60 MMHG | OXYGEN SATURATION: 97 %

## 2022-10-17 DIAGNOSIS — E66.9 OBESITY (BMI 30.0-34.9): ICD-10-CM

## 2022-10-17 DIAGNOSIS — Z91.199 NONCOMPLIANCE WITH DIABETES TREATMENT: ICD-10-CM

## 2022-10-17 DIAGNOSIS — I10 ESSENTIAL HYPERTENSION: ICD-10-CM

## 2022-10-17 DIAGNOSIS — E11.69 DM TYPE 2 WITH DIABETIC MIXED HYPERLIPIDEMIA: ICD-10-CM

## 2022-10-17 DIAGNOSIS — Z71.9 HEALTH EDUCATION/COUNSELING: ICD-10-CM

## 2022-10-17 DIAGNOSIS — Z59.9 FINANCIAL DIFFICULTIES: ICD-10-CM

## 2022-10-17 DIAGNOSIS — E11.65 TYPE 2 DIABETES MELLITUS WITH HYPERGLYCEMIA, WITH LONG-TERM CURRENT USE OF INSULIN: Primary | Chronic | ICD-10-CM

## 2022-10-17 DIAGNOSIS — E78.2 MIXED HYPERLIPIDEMIA: ICD-10-CM

## 2022-10-17 DIAGNOSIS — E78.2 DM TYPE 2 WITH DIABETIC MIXED HYPERLIPIDEMIA: ICD-10-CM

## 2022-10-17 DIAGNOSIS — Z79.4 TYPE 2 DIABETES MELLITUS WITH HYPERGLYCEMIA, WITH LONG-TERM CURRENT USE OF INSULIN: Primary | Chronic | ICD-10-CM

## 2022-10-17 PROCEDURE — 99214 OFFICE O/P EST MOD 30 MIN: CPT | Mod: S$GLB,,, | Performed by: NURSE PRACTITIONER

## 2022-10-17 PROCEDURE — 1160F RVW MEDS BY RX/DR IN RCRD: CPT | Mod: CPTII,S$GLB,, | Performed by: NURSE PRACTITIONER

## 2022-10-17 PROCEDURE — 1160F PR REVIEW ALL MEDS BY PRESCRIBER/CLIN PHARMACIST DOCUMENTED: ICD-10-PCS | Mod: CPTII,S$GLB,, | Performed by: NURSE PRACTITIONER

## 2022-10-17 PROCEDURE — 3051F PR MOST RECENT HEMOGLOBIN A1C LEVEL 7.0 - < 8.0%: ICD-10-PCS | Mod: CPTII,S$GLB,, | Performed by: NURSE PRACTITIONER

## 2022-10-17 PROCEDURE — 3074F PR MOST RECENT SYSTOLIC BLOOD PRESSURE < 130 MM HG: ICD-10-PCS | Mod: CPTII,S$GLB,, | Performed by: NURSE PRACTITIONER

## 2022-10-17 PROCEDURE — 99214 PR OFFICE/OUTPT VISIT, EST, LEVL IV, 30-39 MIN: ICD-10-PCS | Mod: S$GLB,,, | Performed by: NURSE PRACTITIONER

## 2022-10-17 PROCEDURE — 3078F PR MOST RECENT DIASTOLIC BLOOD PRESSURE < 80 MM HG: ICD-10-PCS | Mod: CPTII,S$GLB,, | Performed by: NURSE PRACTITIONER

## 2022-10-17 PROCEDURE — 1159F MED LIST DOCD IN RCRD: CPT | Mod: CPTII,S$GLB,, | Performed by: NURSE PRACTITIONER

## 2022-10-17 PROCEDURE — 3066F NEPHROPATHY DOC TX: CPT | Mod: CPTII,S$GLB,, | Performed by: NURSE PRACTITIONER

## 2022-10-17 PROCEDURE — 3066F PR DOCUMENTATION OF TREATMENT FOR NEPHROPATHY: ICD-10-PCS | Mod: CPTII,S$GLB,, | Performed by: NURSE PRACTITIONER

## 2022-10-17 PROCEDURE — 1159F PR MEDICATION LIST DOCUMENTED IN MEDICAL RECORD: ICD-10-PCS | Mod: CPTII,S$GLB,, | Performed by: NURSE PRACTITIONER

## 2022-10-17 PROCEDURE — 3061F PR NEG MICROALBUMINURIA RESULT DOCUMENTED/REVIEW: ICD-10-PCS | Mod: CPTII,S$GLB,, | Performed by: NURSE PRACTITIONER

## 2022-10-17 PROCEDURE — 99999 PR PBB SHADOW E&M-EST. PATIENT-LVL V: ICD-10-PCS | Mod: PBBFAC,,, | Performed by: NURSE PRACTITIONER

## 2022-10-17 PROCEDURE — 3061F NEG MICROALBUMINURIA REV: CPT | Mod: CPTII,S$GLB,, | Performed by: NURSE PRACTITIONER

## 2022-10-17 PROCEDURE — 4010F ACE/ARB THERAPY RXD/TAKEN: CPT | Mod: CPTII,S$GLB,, | Performed by: NURSE PRACTITIONER

## 2022-10-17 PROCEDURE — 3078F DIAST BP <80 MM HG: CPT | Mod: CPTII,S$GLB,, | Performed by: NURSE PRACTITIONER

## 2022-10-17 PROCEDURE — 99999 PR PBB SHADOW E&M-EST. PATIENT-LVL V: CPT | Mod: PBBFAC,,, | Performed by: NURSE PRACTITIONER

## 2022-10-17 PROCEDURE — 3074F SYST BP LT 130 MM HG: CPT | Mod: CPTII,S$GLB,, | Performed by: NURSE PRACTITIONER

## 2022-10-17 PROCEDURE — 3051F HG A1C>EQUAL 7.0%<8.0%: CPT | Mod: CPTII,S$GLB,, | Performed by: NURSE PRACTITIONER

## 2022-10-17 PROCEDURE — 4010F PR ACE/ARB THEARPY RXD/TAKEN: ICD-10-PCS | Mod: CPTII,S$GLB,, | Performed by: NURSE PRACTITIONER

## 2022-10-17 RX ORDER — PEN NEEDLE, DIABETIC 30 GX3/16"
NEEDLE, DISPOSABLE MISCELLANEOUS
Qty: 400 EACH | Refills: 3 | Status: SHIPPED | OUTPATIENT
Start: 2022-10-17 | End: 2023-02-08 | Stop reason: SDUPTHER

## 2022-10-17 RX ORDER — METFORMIN HYDROCHLORIDE 1000 MG/1
1000 TABLET ORAL 2 TIMES DAILY WITH MEALS
Qty: 180 TABLET | Refills: 1 | Status: SHIPPED | OUTPATIENT
Start: 2022-10-17 | End: 2023-02-08 | Stop reason: SDUPTHER

## 2022-10-17 RX ORDER — DAPAGLIFLOZIN 10 MG/1
10 TABLET, FILM COATED ORAL DAILY
Qty: 90 TABLET | Refills: 1 | Status: SHIPPED | OUTPATIENT
Start: 2022-10-17 | End: 2023-02-08 | Stop reason: SDUPTHER

## 2022-10-17 RX ORDER — INSULIN ASPART 100 [IU]/ML
INJECTION, SOLUTION INTRAVENOUS; SUBCUTANEOUS
Qty: 90 ML | Refills: 2 | Status: SHIPPED | OUTPATIENT
Start: 2022-10-17 | End: 2023-02-08 | Stop reason: SDUPTHER

## 2022-10-17 RX ORDER — INSULIN DEGLUDEC 200 U/ML
42 INJECTION, SOLUTION SUBCUTANEOUS DAILY
Qty: 9 PEN | Refills: 2 | Status: SHIPPED | OUTPATIENT
Start: 2022-10-17 | End: 2023-02-08 | Stop reason: SDUPTHER

## 2022-10-17 RX ORDER — SEMAGLUTIDE 2.68 MG/ML
2 INJECTION, SOLUTION SUBCUTANEOUS
Qty: 9 ML | Refills: 3 | Status: SHIPPED | OUTPATIENT
Start: 2022-10-17 | End: 2023-02-08 | Stop reason: SDUPTHER

## 2022-10-17 SDOH — SOCIAL DETERMINANTS OF HEALTH (SDOH): PROBLEM RELATED TO HOUSING AND ECONOMIC CIRCUMSTANCES, UNSPECIFIED: Z59.9

## 2022-10-17 NOTE — ASSESSMENT & PLAN NOTE
Body mass index is 31.46 kg/m².  Increases insulin resistance.   Discussed DM diet and exercise.   Encouraged  weight loss

## 2022-10-17 NOTE — PROGRESS NOTES
CC:   Chief Complaint   Patient presents with    Diabetes Mellitus       HPI: Cedrick Saunders is a 57 y.o. male presents for a follow up visit today for the management of T2DM.     He was diagnosed with Type 2 diabetes at age 37 with s/s of polyuria, nocturia, and polydipsia and BG was 600. A1c at diagnosis was > 12%. He was initially on oral medications Actos and Metformin. Insulin therapy was started in 2016.     Family hx of diabetes: father, paternal grandmother   Hospitalized for diabetes: denies     No personal or FH of thyroid cancer or personal of pancreatic cancer or pancreatitis.     Our last visit was in June of 2022  At that visit we discussed resubmitting for the CeQur simplicity-- it was too expensive with new insurance  We continued the metformin, continued the Farxiga, increased his Ozempic, continued his Tresiba, changed his NovoLog to Fiasp   He unfortunately can not afford the Dexcom.  We did place a freestyle Maria De Jesus sample at that visit  He reports he could not afford the freestyle Maria De Jesus either  A1c went from 8% to 7.4%  Has had some issues with the cost of his medications were a what period of time without the Ozempic due to cost  Has a high pharmacy deductible  Does not want to make changes to his medications today as he plans to apply for a different insurance policy at the beginning of the year  Wants a 90 day supply on medications      DIABETES COMPLICATIONS: none      Diabetes Management Status    ASA:  Yes - 81 mg daily     Statin: Taking- Lipitor 20 mg   ACE/ARB: Taking- Lisinopril 20 mg     The ASCVD Risk score (Leonie ZIEGLER, et al., 2019) failed to calculate for the following reasons:    The valid total cholesterol range is 130 to 320 mg/dL      Screening or Prevention Patient's value Goal Complete/Controlled?   HgA1C Testing and Control   Lab Results   Component Value Date    HGBA1C 7.4 (H) 10/11/2022      Annually/Less than 8% No   Lipid profile : 06/10/2022 Annually Yes   LDL  control Lab Results   Component Value Date    LDLCALC 61.0 (L) 06/10/2022    Annually/Less than 100 mg/dl  Yes   Nephropathy screening Lab Results   Component Value Date    LABMICR <5.0 10/11/2022     Lab Results   Component Value Date    PROTEINUA Trace (A) 05/19/2006    Annually Yes   Blood pressure BP Readings from Last 1 Encounters:   10/17/22 110/60    Less than 140/90 Yes   Dilated retinal exam : 07/26/2019- Annually No   Foot exam   : 10/17/2022 Annually Yes       CURRENT A1C:    Hemoglobin A1C   Date Value Ref Range Status   10/11/2022 7.4 (H) 4.0 - 5.6 % Final     Comment:     ADA Screening Guidelines:  5.7-6.4%  Consistent with prediabetes  >or=6.5%  Consistent with diabetes    High levels of fetal hemoglobin interfere with the HbA1C  assay. Heterozygous hemoglobin variants (HbS, HgC, etc)do  not significantly interfere with this assay.   However, presence of multiple variants may affect accuracy.     06/10/2022 8.0 (H) 4.0 - 5.6 % Final     Comment:     ADA Screening Guidelines:  5.7-6.4%  Consistent with prediabetes  >or=6.5%  Consistent with diabetes    High levels of fetal hemoglobin interfere with the HbA1C  assay. Heterozygous hemoglobin variants (HbS, HgC, etc)do  not significantly interfere with this assay.   However, presence of multiple variants may affect accuracy.     04/13/2022 8.2 (H) 4.0 - 5.6 % Final     Comment:     ADA Screening Guidelines:  5.7-6.4%  Consistent with prediabetes  >or=6.5%  Consistent with diabetes    High levels of fetal hemoglobin interfere with the HbA1C  assay. Heterozygous hemoglobin variants (HbS, HgC, etc)do  not significantly interfere with this assay.   However, presence of multiple variants may affect accuracy.         GOAL A1C: 7% or less without hypoglycemia.         DM MEDICATIONS USED IN THE PAST: Metformin, Januvia, Basaglar, Farxiga, Glyburide, Actos   Invokana- yeast infections, Tresiba, Ozempic   VGo  Novolog   Dexcom -- off due to cost   Fiasp -- off due  to cost       CURRENT DIABETES MEDICATIONS: Metformin 1000 mg BID, Farxiga 10 mg daily, Ozempic 2 mg weekly (), Tresiba 42 units nightly ( Bedtime-- 9-10 PM) and Novolog 8-12 units with meals --- 12 units with larger meals- pasta   Insulin: pens   Missed doses:  has missed doses due to the cost of the medications-- high pharmacy deductible.   Occ missing prandial insulin doses- BG was 240 on labs- missed prandial insulin       BLOOD GLUCOSE MONITORING:  checking BG 5 times per day   BG was 138 prior to our visit.   Per oral recall:   FB this AM          HYPOGLYCEMIA:  Rarely   Down to the 60's   Carries crackers / juice       MEALS: eating 3 meals per day   Peanut butter crackers  while on the boat   Diet green tea or water.   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   HENT:  Negative for trouble swallowing.    Eyes:  Negative for visual disturbance.   Respiratory:  Negative for shortness of breath.    Cardiovascular:  Negative for chest pain.   Gastrointestinal:  Negative for nausea.   Endocrine: Negative for polydipsia, polyphagia and polyuria.   Genitourinary:         No Nocturia    Musculoskeletal:  Negative for arthralgias.   Skin:  Negative for rash and wound.   Neurological:  Negative for numbness.   Psychiatric/Behavioral:  Negative for dysphoric mood.      Physical Exam   Physical Exam  Vitals and nursing note reviewed.   Constitutional:       Appearance: He is well-developed. He is obese.   HENT:      Head: Normocephalic and atraumatic.      Right Ear: External ear normal.      Left Ear: External ear normal.      Nose: Nose normal.   Neck:      Thyroid: No thyromegaly.      Trachea: No tracheal deviation.   Cardiovascular:      Rate and Rhythm: Normal rate and regular rhythm.      Heart sounds: No  murmur heard.  Pulmonary:      Effort: Pulmonary effort is normal. No respiratory distress.      Breath sounds: Normal breath sounds.   Abdominal:      Palpations: Abdomen is soft.      Tenderness: There is no abdominal tenderness.      Hernia: No hernia is present.   Musculoskeletal:      Cervical back: Normal range of motion and neck supple.   Skin:     General: Skin is warm and dry.      Capillary Refill: Capillary refill takes less than 2 seconds.      Findings: No rash.      Comments: injection sites with no complications.  No lipo hypertropthy or atrophy   Neurological:      Mental Status: He is alert and oriented to person, place, and time.      Cranial Nerves: No cranial nerve deficit.   Psychiatric:         Mood and Affect: Mood is not depressed. Affect is not tearful.         Behavior: Behavior normal.         Judgment: Judgment normal.       FOOT EXAMINATION:  wearing crocs      Protective Sensation (w/ 10 gram monofilament):  Right: Intact  Left: Intact    Visual Inspection:  Normal -  Bilateral and Nails Intact - without Evidence of Foot Deformity- Bilateral    Pedal Pulses:   Right: Present  Left: Present    Posterior tibialis:   Right:Present  Left: Present        Lab Results   Component Value Date    TSH 1.970 06/10/2022           Type 2 diabetes mellitus with hyperglycemia, with long-term current use of insulin  A1c is trending down. Above goal but improving.   + prandial excursions r/t dietary indiscretions and/ or missed doses of medications.   Cost is an issue with diabetes management.     Much better controlled on VGo- but insurance will not cover   He is unable to afford a personal CGM- such as Dexcom - which is unfortunate as his blood sugar readings were much better controlled when he was on a personal C GM.      Medication changes:       Continue Metformin 1000 mg 2 times per day      Continue Farxiga 10 mg daily,     Continue Ozempic 2 mg weekly      Continue Tresiba 42 units daily      Continue Fiasp 8-12 units TID AC     Discussed compliance, discuss cost, discuss coupons  He does not wish to change to cheaper agents at this time.  He plans to get a new insurance policy with a lower pharmacy deductible for 2023      -- Reviewed goals of therapy are to get the best control we can without hypoglycemia  -- Reviewed patient's current insulin regimen. Clarified proper insulin dose and timing in relation to meals, etc. Insulin injection sites and proper rotation instructed.    -- Advised frequent self blood glucose monitoring.  Patient encouraged to document glucose results and bring them to every clinic visit -   Discussed the importance of SBGM. He does better with a personal CGM. Cannot afford dexcom   Couldn't afford freestyle zachariah either   -- Hypoglycemia precautions discussed. Instructed on precautions before driving.    -- Call for Bg repeatedly < 70 or > 180.   -- Close adherence to lifestyle changes recommended.   -- Periodic follow ups for eye evaluations, foot care and dental care suggested.         Essential hypertension  BP goal is < 140/90.   Tolerating ACEi  Controlled   Blood pressure goals discussed with patient      Mixed hyperlipidemia  On statin per ADA recommendations      DM type 2 with diabetic mixed hyperlipidemia  On statin per ADA recommendations  LDL goal < 100.  Continue statin.       Obesity (BMI 30.0-34.9)  Body mass index is 31.46 kg/m².  Increases insulin resistance.   Discussed DM diet and exercise.   Encouraged  weight loss      Spent 25 minutes with patient with > 50% time spent in counseling, as noted above on medications, diet, compliance         Follow up in about 3 months (around 1/17/2023).  Follow up with me in 3 months with labs prior   He will call the clinic and let us know if we need to change the labs to [facility]      Orders Placed This Encounter   Procedures    Hemoglobin A1C     Standing Status:   Future     Standing Expiration Date:   4/17/2024     Basic Metabolic Panel     Standing Status:   Future     Standing Expiration Date:   4/17/2024         Recommendations were discussed with the patient in detail  The patient verbalized understanding and agrees with the plan outlined as above.

## 2022-10-17 NOTE — ASSESSMENT & PLAN NOTE
A1c is trending down. Above goal but improving.   + prandial excursions r/t dietary indiscretions and/ or missed doses of medications.   Cost is an issue with diabetes management.     Much better controlled on VGo- but insurance will not cover   He is unable to afford a personal CGM- such as Dexcom - which is unfortunate as his blood sugar readings were much better controlled when he was on a personal C GM.      Medication changes:       Continue Metformin 1000 mg 2 times per day      Continue Farxiga 10 mg daily,     Continue Ozempic 2 mg weekly      Continue Tresiba 42 units daily     Continue Fiasp 8-12 units TID AC     Discussed compliance, discuss cost, discuss coupons  He does not wish to change to cheaper agents at this time.  He plans to get a new insurance policy with a lower pharmacy deductible for 2023      -- Reviewed goals of therapy are to get the best control we can without hypoglycemia  -- Reviewed patient's current insulin regimen. Clarified proper insulin dose and timing in relation to meals, etc. Insulin injection sites and proper rotation instructed.    -- Advised frequent self blood glucose monitoring.  Patient encouraged to document glucose results and bring them to every clinic visit -   Discussed the importance of SBGM. He does better with a personal CGM. Cannot afford dexcom   Couldn't afford freestyle zachariah either   -- Hypoglycemia precautions discussed. Instructed on precautions before driving.    -- Call for Bg repeatedly < 70 or > 180.   -- Close adherence to lifestyle changes recommended.   -- Periodic follow ups for eye evaluations, foot care and dental care suggested.

## 2022-10-17 NOTE — PATIENT INSTRUCTIONS
VGO     Dexcom / Freestyle zachariah     Tresiba: Toujeo, Lantus, Levemir, NPH ( Novolin N)     Novolog: Fiasp, Lyumjev, Humalog, Regular insulin ( Humulin R or Novolin R)     Farxiga--- Jardiance, Invokana, Robertglmanuel Garcia-- Trulicity, Bydureon, Victoza ( daily), Mounjaro (new)

## 2022-11-04 ENCOUNTER — TELEPHONE (OUTPATIENT)
Dept: PRIMARY CARE CLINIC | Facility: CLINIC | Age: 57
End: 2022-11-04
Payer: COMMERCIAL

## 2022-11-04 NOTE — TELEPHONE ENCOUNTER
----- Message from Dottie Schneiedr LPN sent at 11/4/2022 11:31 AM CDT -----  Contact: Pt [phone]    ----- Message -----  From: Samantha Guerra  Sent: 11/4/2022  10:00 AM CDT  To: Ean Johnson Staff    Patient is returning a phone call.  Who left a message for the patient: Radhika GUAJARDO [place]  Does patient know what this is regarding:  Yes  Would you like a call back, or a response through your [facility] portal?:   call  Comments:

## 2022-11-04 NOTE — TELEPHONE ENCOUNTER
Returned call to patient. Patient stated that he went to [facility] for his eye screening. They did dilate his eyes and told him no signs of diabetes in his eyes.

## 2022-11-07 ENCOUNTER — PATIENT OUTREACH (OUTPATIENT)
Dept: ADMINISTRATIVE | Facility: HOSPITAL | Age: 57
End: 2022-11-07
Payer: COMMERCIAL

## 2022-11-07 NOTE — PROGRESS NOTES
Chart review done.  updated. Immunizations reviewed & updated. Care Everywhere updated.  TIFFANIE SENT TO [facility] FOR PATIENTS EYE EXAM RESULTS  [id number]#[phone]  Health Maintenance Due   Topic Date Due    Shingles Vaccine (1 of 2) Never done    Pneumococcal Vaccines (Age 0-64) (2 - PCV) 12/02/2016    Eye Exam  07/26/2020    COVID-19 Vaccine (4 - Booster for Pfizer series) 03/11/2022    Influenza Vaccine (1) 09/01/2022

## 2022-11-07 NOTE — LETTER
AUTHORIZATION FOR RELEASE OF   CONFIDENTIAL INFORMATION    Dear MEDICAL RECORDS,    We are seeing Cedrick Saunders, date of birth 1965, in the clinic at [facility]. Alex Mckoy MD is the patient's PCP. Cedrick Saunders has an outstanding lab/procedure at the time we reviewed his chart. In order to help keep his health information updated, he has authorized us to request the following medical record(s):        (  )  MAMMOGRAM                                      (  )  COLONOSCOPY      (  )  PAP SMEAR                                          (  )  OUTSIDE LAB RESULTS     (  )  DEXA SCAN                                          ( X )  EYE EXAM            (  )  FOOT EXAM                                          (  )  ENTIRE RECORD     (  )  OUTSIDE IMMUNIZATIONS                 (  )  _______________         Please fax records to [facility], Ryan M Truxillo, MD, [phone]      Patient Name: Cedrick Saunders  : 1965  Patient Phone #: [phone]

## 2023-02-08 DIAGNOSIS — Z79.4 TYPE 2 DIABETES MELLITUS WITH HYPERGLYCEMIA, WITH LONG-TERM CURRENT USE OF INSULIN: Chronic | ICD-10-CM

## 2023-02-08 DIAGNOSIS — E11.65 TYPE 2 DIABETES MELLITUS WITH HYPERGLYCEMIA, WITH LONG-TERM CURRENT USE OF INSULIN: Chronic | ICD-10-CM

## 2023-02-08 DIAGNOSIS — E78.2 MIXED HYPERLIPIDEMIA: ICD-10-CM

## 2023-02-08 RX ORDER — METFORMIN HYDROCHLORIDE 1000 MG/1
1000 TABLET ORAL 2 TIMES DAILY WITH MEALS
Qty: 180 TABLET | Refills: 1 | Status: SHIPPED | OUTPATIENT
Start: 2023-02-08 | End: 2023-03-27 | Stop reason: SDUPTHER

## 2023-02-08 RX ORDER — SEMAGLUTIDE 2.68 MG/ML
2 INJECTION, SOLUTION SUBCUTANEOUS
Qty: 3 PEN | Refills: 1 | Status: SHIPPED | OUTPATIENT
Start: 2023-02-08 | End: 2023-03-27 | Stop reason: SDUPTHER

## 2023-02-08 RX ORDER — DAPAGLIFLOZIN 10 MG/1
10 TABLET, FILM COATED ORAL DAILY
Qty: 90 TABLET | Refills: 1 | Status: SHIPPED | OUTPATIENT
Start: 2023-02-08 | End: 2023-03-27 | Stop reason: SDUPTHER

## 2023-02-08 RX ORDER — ATORVASTATIN CALCIUM 20 MG/1
20 TABLET, FILM COATED ORAL NIGHTLY
Qty: 90 TABLET | Refills: 1 | Status: SHIPPED | OUTPATIENT
Start: 2023-02-08 | End: 2023-03-20

## 2023-02-08 RX ORDER — INSULIN ASPART 100 [IU]/ML
INJECTION, SOLUTION INTRAVENOUS; SUBCUTANEOUS
Qty: 90 ML | Refills: 2 | Status: SHIPPED | OUTPATIENT
Start: 2023-02-08 | End: 2023-03-27

## 2023-02-08 RX ORDER — LISINOPRIL AND HYDROCHLOROTHIAZIDE 12.5; 2 MG/1; MG/1
1 TABLET ORAL DAILY
Qty: 90 TABLET | Refills: 0 | Status: SHIPPED | OUTPATIENT
Start: 2023-02-08 | End: 2023-05-29

## 2023-02-08 RX ORDER — LISINOPRIL AND HYDROCHLOROTHIAZIDE 12.5; 2 MG/1; MG/1
TABLET ORAL
Qty: 90 TABLET | Refills: 0 | Status: SHIPPED | OUTPATIENT
Start: 2023-02-08 | End: 2023-02-08 | Stop reason: SDUPTHER

## 2023-02-08 RX ORDER — FLASH GLUCOSE SENSOR
1 KIT MISCELLANEOUS
Qty: 2 KIT | Refills: 11 | Status: SHIPPED | OUTPATIENT
Start: 2023-02-08 | End: 2023-03-27

## 2023-02-08 RX ORDER — INSULIN DEGLUDEC 200 U/ML
42 INJECTION, SOLUTION SUBCUTANEOUS DAILY
Qty: 9 PEN | Refills: 1 | Status: SHIPPED | OUTPATIENT
Start: 2023-02-08 | End: 2023-03-27 | Stop reason: SDUPTHER

## 2023-02-08 RX ORDER — INSULIN PUMP SYRINGE, 3 ML
EACH MISCELLANEOUS
Qty: 1 EACH | Refills: 0 | Status: SHIPPED | OUTPATIENT
Start: 2023-02-08 | End: 2023-03-20

## 2023-02-08 RX ORDER — PEN NEEDLE, DIABETIC 30 GX3/16"
NEEDLE, DISPOSABLE MISCELLANEOUS
Qty: 400 EACH | Refills: 3 | Status: SHIPPED | OUTPATIENT
Start: 2023-02-08 | End: 2023-03-27 | Stop reason: SDUPTHER

## 2023-02-08 NOTE — TELEPHONE ENCOUNTER
Refill Routing Note   Medication(s) are not appropriate for processing by [facility] for the following reason(s):       ED/Hospital Visit since last OV with PCP    [facility] action(s):  Defer                   Appointments  past 12m or future 3m with PCP    Date Provider   Last Visit   1/11/2022 Alex Mckoy MD   Next Visit   Visit date not found Alex Mckoy MD   ED visits in past 90 days: 0        Note composed:7:57 AM 02/08/2023

## 2023-02-08 NOTE — TELEPHONE ENCOUNTER
Pt states his glucometer is lost, asked for another to be sent to pharmacy [facility] along with all other medications

## 2023-02-08 NOTE — TELEPHONE ENCOUNTER
No new care gaps identified.  [facility] Embedded Care Gaps. Reference number: [id number]. 2/08/2023   1:03:40 PM CST

## 2023-02-08 NOTE — TELEPHONE ENCOUNTER
Care Due:                  Date            Visit Type   Department     Provider  --------------------------------------------------------------------------------                                EP -                              PRIMARY      [facility]  Last Visit: 01-      CARE ([facility])   [facility]IMARY [facility]   Alex Mckoy  Next Visit: None Scheduled  None         None Found                                                            Last  Test          Frequency    Reason                     Performed    Due Date  --------------------------------------------------------------------------------    Office Visit  12 months..  EScitalopram,              01- 01-                             lisinopriL-hydrochlorothi                             azide....................    [employer] Embedded Care Gaps. Reference number: [id number]. 2/08/2023   12:17:26 AM CST

## 2023-02-08 NOTE — TELEPHONE ENCOUNTER
----- Message from Oksana Fulton sent at 2/8/2023 12:51 PM CST -----  Contact: [phone]    Can you please sent pt's lisinopriL-hydrochlorothiazide (PRINZIDE,ZESTORETIC) 20-12.5 mg per tablet to his new pharmacy. He is no longer using [facility]. Please send to:    [place] [id number]  [address] SPIVEY  [address] [facility]  [address]  [place] [address]  Phone: [phone] Fax: [phone]

## 2023-03-02 ENCOUNTER — PATIENT MESSAGE (OUTPATIENT)
Dept: DIABETES | Facility: CLINIC | Age: 58
End: 2023-03-02
Payer: COMMERCIAL

## 2023-03-03 ENCOUNTER — TELEPHONE (OUTPATIENT)
Dept: DIABETES | Facility: CLINIC | Age: 58
End: 2023-03-03
Payer: COMMERCIAL

## 2023-03-06 ENCOUNTER — PATIENT OUTREACH (OUTPATIENT)
Dept: ADMINISTRATIVE | Facility: HOSPITAL | Age: 58
End: 2023-03-06
Payer: COMMERCIAL

## 2023-03-06 NOTE — PROGRESS NOTES
Health Maintenance Due   Topic Date Due    Shingles Vaccine (1 of 2) Never done    Pneumococcal Vaccines (Age 0-64) (2 - PCV) 12/02/2016    Eye Exam  07/26/2020    COVID-19 Vaccine (4 - Booster for Pfizer series) 03/11/2022    Influenza Vaccine (1) 09/01/2022        Chart review done.   HM updated.   Immunizations reviewed & updated.   Care Everywhere updated.

## 2023-03-09 ENCOUNTER — TELEPHONE (OUTPATIENT)
Dept: DIABETES | Facility: CLINIC | Age: 58
End: 2023-03-09
Payer: COMMERCIAL

## 2023-03-20 ENCOUNTER — OFFICE VISIT (OUTPATIENT)
Dept: PRIMARY CARE CLINIC | Facility: CLINIC | Age: 58
End: 2023-03-20
Payer: COMMERCIAL

## 2023-03-20 VITALS
DIASTOLIC BLOOD PRESSURE: 64 MMHG | SYSTOLIC BLOOD PRESSURE: 122 MMHG | TEMPERATURE: 98 F | HEART RATE: 79 BPM | HEIGHT: 72 IN | OXYGEN SATURATION: 99 % | RESPIRATION RATE: 18 BRPM | BODY MASS INDEX: 31.46 KG/M2

## 2023-03-20 DIAGNOSIS — D48.5 NEOPLASM OF UNCERTAIN BEHAVIOR OF SKIN: ICD-10-CM

## 2023-03-20 DIAGNOSIS — E11.69 DM TYPE 2 WITH DIABETIC MIXED HYPERLIPIDEMIA: Primary | ICD-10-CM

## 2023-03-20 DIAGNOSIS — I10 ESSENTIAL HYPERTENSION: ICD-10-CM

## 2023-03-20 DIAGNOSIS — Z23 NEED FOR VACCINATION: ICD-10-CM

## 2023-03-20 DIAGNOSIS — E78.2 DM TYPE 2 WITH DIABETIC MIXED HYPERLIPIDEMIA: Primary | ICD-10-CM

## 2023-03-20 DIAGNOSIS — E78.2 MIXED HYPERLIPIDEMIA: ICD-10-CM

## 2023-03-20 PROBLEM — R29.898 DECREASED RANGE OF MOTION OF NECK: Status: RESOLVED | Noted: 2021-11-30 | Resolved: 2023-03-20

## 2023-03-20 PROBLEM — L03.113 CELLULITIS OF RIGHT UPPER EXTREMITY: Status: RESOLVED | Noted: 2022-04-13 | Resolved: 2023-03-20

## 2023-03-20 PROBLEM — R29.898 DECREASED STRENGTH OF UPPER EXTREMITY: Status: RESOLVED | Noted: 2021-11-30 | Resolved: 2023-03-20

## 2023-03-20 PROBLEM — S61.431A PUNCTURE WOUND OF RIGHT HAND WITHOUT FOREIGN BODY: Status: RESOLVED | Noted: 2022-04-13 | Resolved: 2023-03-20

## 2023-03-20 PROCEDURE — 3078F DIAST BP <80 MM HG: CPT | Mod: CPTII,S$GLB,, | Performed by: FAMILY MEDICINE

## 2023-03-20 PROCEDURE — 90471 IMMUNIZATION ADMIN: CPT | Mod: S$GLB,,, | Performed by: FAMILY MEDICINE

## 2023-03-20 PROCEDURE — 99999 PR PBB SHADOW E&M-EST. PATIENT-LVL V: ICD-10-PCS | Mod: PBBFAC,,, | Performed by: FAMILY MEDICINE

## 2023-03-20 PROCEDURE — 1159F PR MEDICATION LIST DOCUMENTED IN MEDICAL RECORD: ICD-10-PCS | Mod: CPTII,S$GLB,, | Performed by: FAMILY MEDICINE

## 2023-03-20 PROCEDURE — 90677 PCV20 VACCINE IM: CPT | Mod: S$GLB,,, | Performed by: FAMILY MEDICINE

## 2023-03-20 PROCEDURE — 90677 PNEUMOCOCCAL CONJUGATE VACCINE 20-VALENT: ICD-10-PCS | Mod: S$GLB,,, | Performed by: FAMILY MEDICINE

## 2023-03-20 PROCEDURE — 3078F PR MOST RECENT DIASTOLIC BLOOD PRESSURE < 80 MM HG: ICD-10-PCS | Mod: CPTII,S$GLB,, | Performed by: FAMILY MEDICINE

## 2023-03-20 PROCEDURE — 99214 OFFICE O/P EST MOD 30 MIN: CPT | Mod: 25,S$GLB,, | Performed by: FAMILY MEDICINE

## 2023-03-20 PROCEDURE — 3008F PR BODY MASS INDEX (BMI) DOCUMENTED: ICD-10-PCS | Mod: CPTII,S$GLB,, | Performed by: FAMILY MEDICINE

## 2023-03-20 PROCEDURE — 3074F PR MOST RECENT SYSTOLIC BLOOD PRESSURE < 130 MM HG: ICD-10-PCS | Mod: CPTII,S$GLB,, | Performed by: FAMILY MEDICINE

## 2023-03-20 PROCEDURE — 4010F ACE/ARB THERAPY RXD/TAKEN: CPT | Mod: CPTII,S$GLB,, | Performed by: FAMILY MEDICINE

## 2023-03-20 PROCEDURE — 99214 PR OFFICE/OUTPT VISIT, EST, LEVL IV, 30-39 MIN: ICD-10-PCS | Mod: 25,S$GLB,, | Performed by: FAMILY MEDICINE

## 2023-03-20 PROCEDURE — 3074F SYST BP LT 130 MM HG: CPT | Mod: CPTII,S$GLB,, | Performed by: FAMILY MEDICINE

## 2023-03-20 PROCEDURE — 4010F PR ACE/ARB THEARPY RXD/TAKEN: ICD-10-PCS | Mod: CPTII,S$GLB,, | Performed by: FAMILY MEDICINE

## 2023-03-20 PROCEDURE — 1160F PR REVIEW ALL MEDS BY PRESCRIBER/CLIN PHARMACIST DOCUMENTED: ICD-10-PCS | Mod: CPTII,S$GLB,, | Performed by: FAMILY MEDICINE

## 2023-03-20 PROCEDURE — 99999 PR PBB SHADOW E&M-EST. PATIENT-LVL V: CPT | Mod: PBBFAC,,, | Performed by: FAMILY MEDICINE

## 2023-03-20 PROCEDURE — 1160F RVW MEDS BY RX/DR IN RCRD: CPT | Mod: CPTII,S$GLB,, | Performed by: FAMILY MEDICINE

## 2023-03-20 PROCEDURE — 90471 PNEUMOCOCCAL CONJUGATE VACCINE 20-VALENT: ICD-10-PCS | Mod: S$GLB,,, | Performed by: FAMILY MEDICINE

## 2023-03-20 PROCEDURE — 1159F MED LIST DOCD IN RCRD: CPT | Mod: CPTII,S$GLB,, | Performed by: FAMILY MEDICINE

## 2023-03-20 PROCEDURE — 3008F BODY MASS INDEX DOCD: CPT | Mod: CPTII,S$GLB,, | Performed by: FAMILY MEDICINE

## 2023-03-20 RX ORDER — DIPHENHYDRAMINE HCL 25 MG
TABLET ORAL
COMMUNITY
Start: 2023-02-08

## 2023-03-20 RX ORDER — ROSUVASTATIN CALCIUM 5 MG/1
5 TABLET, COATED ORAL DAILY
Qty: 90 TABLET | Refills: 3 | Status: SHIPPED | OUTPATIENT
Start: 2023-03-20 | End: 2024-04-03

## 2023-03-20 NOTE — PROGRESS NOTES
Verified pt by name and . Allergies verified by pt. Per physician orders pt was administered Prevnar 20 0.5 mL IM to right deltoid  using aseptic technique. Pt tolerated well. No adverse effects or pain reported. MD notified.

## 2023-03-20 NOTE — PROGRESS NOTES
Subjective:       Patient ID: Cedrick Saunders is a 57 y.o. male.    Chief Complaint: Follow-up (Pt in for a follow-up)    Here for routine checkup.  Insurance lapsed for a couple of months, so he was without medication, thinks his sugar maybe a little higher.  Scheduled for follow-up with diabetes management next week, due for labs.  Has not been taking atorvastatin for the past 3-4 months, says it was causing diffuse arthralgias which resolved within a few weeks of stopping it.  Has taken Crestor in the past with no noted side effects.    Review of Systems   Respiratory:  Negative for shortness of breath.    Cardiovascular:  Negative for chest pain.   Skin:  Positive for color change.   Psychiatric/Behavioral:  Negative for agitation and confusion.      Objective:      Vitals:    03/20/23 0919   BP: 122/64   BP Location: Right arm   Patient Position: Sitting   BP Method: Medium (Manual)   Pulse: 79   Resp: 18   Temp: 97.7 °F (36.5 °C)   TempSrc: Temporal   SpO2: 99%   Height: 6' (1.829 m)     Physical Exam  Vitals and nursing note reviewed.   Constitutional:       General: He is not in acute distress.     Appearance: Normal appearance. He is well-developed.   HENT:      Head: Normocephalic and atraumatic.   Cardiovascular:      Rate and Rhythm: Normal rate and regular rhythm.      Heart sounds: Normal heart sounds.   Pulmonary:      Effort: Pulmonary effort is normal.      Breath sounds: Normal breath sounds.   Musculoskeletal:      Right lower leg: No edema.      Left lower leg: No edema.   Skin:     General: Skin is warm and dry.   Neurological:      Mental Status: He is alert and oriented to person, place, and time.   Psychiatric:         Mood and Affect: Mood normal.         Behavior: Behavior normal.       Lab Results   Component Value Date    WBC 4.36 06/10/2022    HGB 14.5 06/10/2022    HCT 43.7 06/10/2022     06/10/2022    CHOL 109 (L) 06/10/2022    TRIG 80 06/10/2022    HDL 32 (L) 06/10/2022     ALT 25 06/10/2022    AST 15 06/10/2022     10/11/2022    K 4.8 10/11/2022     10/11/2022    CREATININE 0.8 10/11/2022    BUN 12 10/11/2022    CO2 23 10/11/2022    TSH 1.970 06/10/2022    PSA 2.3 06/10/2022    HGBA1C 7.4 (H) 10/11/2022      Assessment:       1. DM type 2 with diabetic mixed hyperlipidemia    2. Essential hypertension    3. Mixed hyperlipidemia    4. Need for vaccination    5. Neoplasm of uncertain behavior of skin          Plan:       DM type 2 with diabetic mixed hyperlipidemia  Check A1c later this week.  Needs annual dilated eye exam to screen for retinopathy  Essential hypertension  Well controlled  Mixed hyperlipidemia  -     rosuvastatin (CRESTOR) 5 MG tablet; Take 1 tablet (5 mg total) by mouth once daily.  Dispense: 90 tablet; Refill: 3  -     Lipid Panel; Future; Expected date: 03/20/2023  Switch to low-dose rosuvastatin, which should be better tolerated than atorvastatin  Need for vaccination  -     Pneumococcal Conjugate Vaccine (20 Valent) (IM)    Neoplasm of uncertain behavior of skin  -     Ambulatory referral/consult to Dermatology; Future; Expected date: 03/27/2023      Medication List with Changes/Refills   New Medications    ROSUVASTATIN (CRESTOR) 5 MG TABLET    Take 1 tablet (5 mg total) by mouth once daily.   Current Medications    ASPIRIN (ECOTRIN) 81 MG EC TABLET    Take 81 mg by mouth once daily.    BLOOD SUGAR DIAGNOSTIC (ACCU-CHEK GUIDE TEST STRIPS) STRP    TO USE TO CHECK BLOOD SUGAR 4 TIMES DAILY. TO USE WITH ACCU CHECK GUIDE ME    DAPAGLIFLOZIN (FARXIGA) 10 MG TABLET    Take 1 tablet (10 mg total) by mouth once daily.    DICLOFENAC (VOLTAREN) 75 MG EC TABLET    Take 1 tablet (75 mg total) by mouth 2 (two) times daily as needed (pain).    DICLOFENAC SODIUM (VOLTAREN) 1 % GEL    Apply 2 g topically 2 (two) times daily as needed (shoulder pain).    ESCITALOPRAM OXALATE (LEXAPRO) 10 MG TABLET    Take 1 tablet (10 mg total) by mouth once daily.    FLASH GLUCOSE  "SENSOR (FREESTYLE TISHA 2 SENSOR) KIT    1 Device by Misc.(Non-Drug; Combo Route) route every 14 (fourteen) days.    IBUPROFEN (ADVIL,MOTRIN) 200 MG TABLET    Take 800 mg by mouth daily as needed for Pain.    INSULIN ASPART U-100 (NOVOLOG FLEXPEN U-100 INSULIN) 100 UNIT/ML (3 ML) INPN PEN    Inject 10-12 units with meals, 3-5 units for snacks, + correction scale. Max TDD of 100 units    INSULIN DEGLUDEC (TRESIBA FLEXTOUCH U-200) 200 UNIT/ML (3 ML) INSULIN PEN    Inject 42 Units into the skin once daily.    LISINOPRIL-HYDROCHLOROTHIAZIDE (PRINZIDE,ZESTORETIC) 20-12.5 MG PER TABLET    Take 1 tablet by mouth once daily.    METFORMIN (GLUCOPHAGE) 1000 MG TABLET    Take 1 tablet (1,000 mg total) by mouth 2 (two) times daily with meals.    PEN NEEDLE, DIABETIC (BD ULTRA-FINE YAMILETH PEN NEEDLE) 32 GAUGE X 5/32" NDLE    To use with insulin 4 times per day    SEMAGLUTIDE (OZEMPIC) 2 MG/DOSE (8 MG/3 ML) PNIJ    Inject 2 mg into the skin every 7 days.    TRUE METRIX AIR GLUCOSE METER MISC    use as directed   Discontinued Medications    ATORVASTATIN (LIPITOR) 20 MG TABLET    Take 1 tablet (20 mg total) by mouth every evening.    BLOOD-GLUCOSE METER KIT    Use as instructed    SCOPOLAMINE (TRANSDERM-SCOP) 1.3-1.5 MG (1 MG OVER 3 DAYS)    Place 1 patch onto the skin every 72 hours.           "

## 2023-03-24 ENCOUNTER — TELEPHONE (OUTPATIENT)
Dept: DIABETES | Facility: CLINIC | Age: 58
End: 2023-03-24
Payer: COMMERCIAL

## 2023-03-27 ENCOUNTER — OFFICE VISIT (OUTPATIENT)
Dept: DIABETES | Facility: CLINIC | Age: 58
End: 2023-03-27
Payer: COMMERCIAL

## 2023-03-27 ENCOUNTER — PATIENT MESSAGE (OUTPATIENT)
Dept: DIABETES | Facility: CLINIC | Age: 58
End: 2023-03-27

## 2023-03-27 ENCOUNTER — TELEPHONE (OUTPATIENT)
Dept: DIABETES | Facility: CLINIC | Age: 58
End: 2023-03-27

## 2023-03-27 VITALS
OXYGEN SATURATION: 98 % | SYSTOLIC BLOOD PRESSURE: 112 MMHG | HEART RATE: 76 BPM | BODY MASS INDEX: 32.75 KG/M2 | HEIGHT: 72 IN | DIASTOLIC BLOOD PRESSURE: 64 MMHG | WEIGHT: 241.81 LBS

## 2023-03-27 DIAGNOSIS — E11.65 TYPE 2 DIABETES MELLITUS WITH HYPERGLYCEMIA, WITH LONG-TERM CURRENT USE OF INSULIN: Primary | Chronic | ICD-10-CM

## 2023-03-27 DIAGNOSIS — E78.2 DM TYPE 2 WITH DIABETIC MIXED HYPERLIPIDEMIA: ICD-10-CM

## 2023-03-27 DIAGNOSIS — Z71.9 HEALTH EDUCATION/COUNSELING: ICD-10-CM

## 2023-03-27 DIAGNOSIS — E66.9 OBESITY (BMI 30.0-34.9): ICD-10-CM

## 2023-03-27 DIAGNOSIS — Z59.9 FINANCIAL DIFFICULTIES: ICD-10-CM

## 2023-03-27 DIAGNOSIS — I10 ESSENTIAL HYPERTENSION: ICD-10-CM

## 2023-03-27 DIAGNOSIS — Z79.4 TYPE 2 DIABETES MELLITUS WITH HYPERGLYCEMIA, WITH LONG-TERM CURRENT USE OF INSULIN: Primary | Chronic | ICD-10-CM

## 2023-03-27 DIAGNOSIS — E78.2 MIXED HYPERLIPIDEMIA: ICD-10-CM

## 2023-03-27 DIAGNOSIS — E11.69 DM TYPE 2 WITH DIABETIC MIXED HYPERLIPIDEMIA: ICD-10-CM

## 2023-03-27 DIAGNOSIS — Z91.148 NONCOMPLIANCE W/MEDICATION TREATMENT DUE TO INTERMIT USE OF MEDICATION: ICD-10-CM

## 2023-03-27 DIAGNOSIS — Z91.199 NONCOMPLIANCE WITH DIABETES TREATMENT: ICD-10-CM

## 2023-03-27 PROCEDURE — 1159F PR MEDICATION LIST DOCUMENTED IN MEDICAL RECORD: ICD-10-PCS | Mod: CPTII,S$GLB,, | Performed by: NURSE PRACTITIONER

## 2023-03-27 PROCEDURE — 3008F BODY MASS INDEX DOCD: CPT | Mod: CPTII,S$GLB,, | Performed by: NURSE PRACTITIONER

## 2023-03-27 PROCEDURE — 3008F PR BODY MASS INDEX (BMI) DOCUMENTED: ICD-10-PCS | Mod: CPTII,S$GLB,, | Performed by: NURSE PRACTITIONER

## 2023-03-27 PROCEDURE — 3078F PR MOST RECENT DIASTOLIC BLOOD PRESSURE < 80 MM HG: ICD-10-PCS | Mod: CPTII,S$GLB,, | Performed by: NURSE PRACTITIONER

## 2023-03-27 PROCEDURE — 99214 PR OFFICE/OUTPT VISIT, EST, LEVL IV, 30-39 MIN: ICD-10-PCS | Mod: S$GLB,,, | Performed by: NURSE PRACTITIONER

## 2023-03-27 PROCEDURE — 4010F ACE/ARB THERAPY RXD/TAKEN: CPT | Mod: CPTII,S$GLB,, | Performed by: NURSE PRACTITIONER

## 2023-03-27 PROCEDURE — 3074F SYST BP LT 130 MM HG: CPT | Mod: CPTII,S$GLB,, | Performed by: NURSE PRACTITIONER

## 2023-03-27 PROCEDURE — 99999 PR PBB SHADOW E&M-EST. PATIENT-LVL IV: CPT | Mod: PBBFAC,,, | Performed by: NURSE PRACTITIONER

## 2023-03-27 PROCEDURE — 4010F PR ACE/ARB THEARPY RXD/TAKEN: ICD-10-PCS | Mod: CPTII,S$GLB,, | Performed by: NURSE PRACTITIONER

## 2023-03-27 PROCEDURE — 99214 OFFICE O/P EST MOD 30 MIN: CPT | Mod: S$GLB,,, | Performed by: NURSE PRACTITIONER

## 2023-03-27 PROCEDURE — 3078F DIAST BP <80 MM HG: CPT | Mod: CPTII,S$GLB,, | Performed by: NURSE PRACTITIONER

## 2023-03-27 PROCEDURE — 1160F PR REVIEW ALL MEDS BY PRESCRIBER/CLIN PHARMACIST DOCUMENTED: ICD-10-PCS | Mod: CPTII,S$GLB,, | Performed by: NURSE PRACTITIONER

## 2023-03-27 PROCEDURE — 3074F PR MOST RECENT SYSTOLIC BLOOD PRESSURE < 130 MM HG: ICD-10-PCS | Mod: CPTII,S$GLB,, | Performed by: NURSE PRACTITIONER

## 2023-03-27 PROCEDURE — 1160F RVW MEDS BY RX/DR IN RCRD: CPT | Mod: CPTII,S$GLB,, | Performed by: NURSE PRACTITIONER

## 2023-03-27 PROCEDURE — 99999 PR PBB SHADOW E&M-EST. PATIENT-LVL IV: ICD-10-PCS | Mod: PBBFAC,,, | Performed by: NURSE PRACTITIONER

## 2023-03-27 PROCEDURE — 1159F MED LIST DOCD IN RCRD: CPT | Mod: CPTII,S$GLB,, | Performed by: NURSE PRACTITIONER

## 2023-03-27 RX ORDER — SEMAGLUTIDE 2.68 MG/ML
2 INJECTION, SOLUTION SUBCUTANEOUS
Qty: 3 EACH | Refills: 1 | Status: SHIPPED | OUTPATIENT
Start: 2023-03-27 | End: 2023-07-28

## 2023-03-27 RX ORDER — INSULIN DEGLUDEC 200 U/ML
42 INJECTION, SOLUTION SUBCUTANEOUS DAILY
Qty: 9 PEN | Refills: 1 | Status: SHIPPED | OUTPATIENT
Start: 2023-03-27

## 2023-03-27 RX ORDER — DAPAGLIFLOZIN 10 MG/1
10 TABLET, FILM COATED ORAL DAILY
Qty: 90 TABLET | Refills: 1 | Status: SHIPPED | OUTPATIENT
Start: 2023-03-27 | End: 2023-10-17 | Stop reason: SDUPTHER

## 2023-03-27 RX ORDER — PEN NEEDLE, DIABETIC 30 GX3/16"
NEEDLE, DISPOSABLE MISCELLANEOUS
Qty: 400 EACH | Refills: 3 | Status: SHIPPED | OUTPATIENT
Start: 2023-03-27

## 2023-03-27 RX ORDER — BLOOD-GLUCOSE SENSOR
1 EACH MISCELLANEOUS
Qty: 9 EACH | Refills: 3 | Status: SHIPPED | OUTPATIENT
Start: 2023-03-27 | End: 2023-03-27 | Stop reason: SDUPTHER

## 2023-03-27 RX ORDER — BLOOD-GLUCOSE SENSOR
1 EACH MISCELLANEOUS
Qty: 3 EACH | Refills: 11 | Status: SHIPPED | OUTPATIENT
Start: 2023-03-27 | End: 2023-06-06 | Stop reason: SDUPTHER

## 2023-03-27 RX ORDER — INSULIN ASPART INJECTION 100 [IU]/ML
INJECTION, SOLUTION SUBCUTANEOUS
Qty: 15 PEN | Refills: 3 | Status: SHIPPED | OUTPATIENT
Start: 2023-03-27 | End: 2023-07-05 | Stop reason: SDUPTHER

## 2023-03-27 RX ORDER — METFORMIN HYDROCHLORIDE 1000 MG/1
1000 TABLET ORAL 2 TIMES DAILY WITH MEALS
Qty: 180 TABLET | Refills: 1 | Status: SHIPPED | OUTPATIENT
Start: 2023-03-27 | End: 2023-11-14 | Stop reason: SDUPTHER

## 2023-03-27 SDOH — SOCIAL DETERMINANTS OF HEALTH (SDOH): PROBLEM RELATED TO HOUSING AND ECONOMIC CIRCUMSTANCES, UNSPECIFIED: Z59.9

## 2023-03-27 NOTE — Clinical Note
Can we please make sure that the dexcom g7 was covered at the pharmacy and check the cost please  Dedra said we may need to send it to her -- but she is not sure  Also make sure the Fiasp is covered and check cost.  Thank you!!

## 2023-03-27 NOTE — TELEPHONE ENCOUNTER
----- Message from Kennedi Christina LPN sent at 3/27/2023  9:31 AM CDT -----  Dexcom G7 not covered requires robbi vazquez 40 dollars

## 2023-03-27 NOTE — ASSESSMENT & PLAN NOTE
Due for A1c check- plans to go to [facility] this week   Suspect A1c may be above goal as he was off of his Ozempic for 5-6 weeks due to insurance lapse.   + prandial excursions r/t dietary indiscretions and/ or missed doses of medications.   Cost is an issue with diabetes management.     Much better controlled on VGo- but insurance will not cover -- and dexcom       Now has new insurance ( [employer]) will check Dexcom G 7 coverage-- RX sent to pharmacy   dexcom G 7 sample applied in clinic today         Medication changes:     Continue Metformin 1000 mg 2 times per day      Continue Farxiga 10 mg daily,     Continue Ozempic 2 mg weekly      Continue Tresiba 42 units daily     Change Novolog to Fiasp 10-12 units TID AC , 3-5 units as needed for a snack, + correction scale goal 150, +1     Discussed compliance, discuss cost, discuss coupons      -- Reviewed goals of therapy are to get the best control we can without hypoglycemia  -- Reviewed patient's current insulin regimen. Clarified proper insulin dose and timing in relation to meals, etc. Insulin injection sites and proper rotation instructed.    -- Advised frequent self blood glucose monitoring.  Patient encouraged to document glucose results and bring them to every clinic visit -   Discussed the importance of SBGM. He does better with a personal CGM. New insurance will submit for dexcom g 7   dexcom g 7 sample applied in clinic today   -- Hypoglycemia precautions discussed. Instructed on precautions before driving.    -- Call for Bg repeatedly < 70 or > 180.   -- Close adherence to lifestyle changes recommended.   -- Periodic follow ups for eye evaluations, foot care and dental care suggested.      Patient has diabetes mellitus and manages diabetes with intensive insulin regimen and uses prandial and basal insulin daily  Patient requires a therapeutic CGM and is willing to use therapeutic CGM for the necessary frequent adjustments of insulin therapy.  I have completed an  in-person visit during the previous 6 months and will continue to have in-person visits every 6 months to assess adherence to their CGM regimen and diabetes treatment plan.  Due to COVID pandemic and need for remote monitoring this tool is medically necessary

## 2023-03-27 NOTE — ASSESSMENT & PLAN NOTE
Body mass index is 32.79 kg/m².  Increases insulin resistance.   Discussed DM diet and exercise.

## 2023-03-27 NOTE — PROGRESS NOTES
CC:   Chief Complaint   Patient presents with    Diabetes Mellitus       HPI: Cedrick Saunders is a 57 y.o. male presents for a follow up visit today for the management of T2DM.     He was diagnosed with Type 2 diabetes at age 37 with s/s of polyuria, nocturia, and polydipsia and BG was 600. A1c at diagnosis was > 12%. He was initially on oral medications Actos and Metformin. Insulin therapy was started in 2016.     Family hx of diabetes: father, paternal grandmother   Hospitalized for diabetes: denies     No personal or FH of thyroid cancer or personal of pancreatic cancer or pancreatitis.       Our last visit was in October 2022 -- at that visit we continued his regimen   Cost has been an issue with his diabetes management as well as dietary indiscretions  Since our last visit he had a lapse of insurance coverage for about 2 months---this did affect his medications  Off ozempic for 5-6 weeks due to lapse of insurance  Now on [employer] -- better coverage -- 3 month supply of Ozempic was $25   Due for labs  -- plans to go to [place]   Interested in personal CGM         DIABETES COMPLICATIONS: none      Diabetes Management Status    ASA:  Yes - 81 mg daily     Statin: hasn't started the  Crestor 5 mg nightly yet-- stopped Lipitor due to arthralgia -- and joint pain resolved with stopping the Lipitor.   ACE/ARB: Taking- Lisinopril 20 mg     The ASCVD Risk score (Leonie DK, et al., 2019) failed to calculate for the following reasons:    The valid total cholesterol range is 130 to 320 mg/dL      Screening or Prevention Patient's value Goal Complete/Controlled?   HgA1C Testing and Control   Lab Results   Component Value Date    HGBA1C 7.4 (H) 10/11/2022      Annually/Less than 8% No   Lipid profile : 06/10/2022 Annually Yes   LDL control Lab Results   Component Value Date    LDLCALC 61.0 (L) 06/10/2022    Annually/Less than 100 mg/dl  Yes   Nephropathy screening Lab Results   Component Value Date    LABMICR <5.0  10/11/2022     Lab Results   Component Value Date    PROTEINUA Trace (A) 05/19/2006    Annually Yes   Blood pressure BP Readings from Last 1 Encounters:   03/27/23 112/64    Less than 140/90 Yes   Dilated retinal exam : 03/24/2022- Annually No   Foot exam   : 10/17/2022 Annually Yes       CURRENT A1C:    Hemoglobin A1C   Date Value Ref Range Status   10/11/2022 7.4 (H) 4.0 - 5.6 % Final     Comment:     ADA Screening Guidelines:  5.7-6.4%  Consistent with prediabetes  >or=6.5%  Consistent with diabetes    High levels of fetal hemoglobin interfere with the HbA1C  assay. Heterozygous hemoglobin variants (HbS, HgC, etc)do  not significantly interfere with this assay.   However, presence of multiple variants may affect accuracy.     06/10/2022 8.0 (H) 4.0 - 5.6 % Final     Comment:     ADA Screening Guidelines:  5.7-6.4%  Consistent with prediabetes  >or=6.5%  Consistent with diabetes    High levels of fetal hemoglobin interfere with the HbA1C  assay. Heterozygous hemoglobin variants (HbS, HgC, etc)do  not significantly interfere with this assay.   However, presence of multiple variants may affect accuracy.     04/13/2022 8.2 (H) 4.0 - 5.6 % Final     Comment:     ADA Screening Guidelines:  5.7-6.4%  Consistent with prediabetes  >or=6.5%  Consistent with diabetes    High levels of fetal hemoglobin interfere with the HbA1C  assay. Heterozygous hemoglobin variants (HbS, HgC, etc)do  not significantly interfere with this assay.   However, presence of multiple variants may affect accuracy.         GOAL A1C: 7% or less without hypoglycemia.       DM MEDICATIONS USED IN THE PAST: Metformin, Januvia, Basaglar, Farxiga, Glyburide, Actos   Invokana- yeast infections, Tresiba, Ozempic   VGo  Novolog   Dexcom -- off due to cost   Fiasp -- off due to cost         CURRENT DIABETES MEDICATIONS: Metformin 1000 mg BID, Farxiga 10 mg daily, Ozempic 2 mg weekly (Mondays), Tresiba 42 units nightly ( Bedtime-- 9-10 PM) and Novolog 10-12  units with meals--- sometimes he will increase to 18 units if its a cheat meal   Insulin: pens   Missed doses: off ozempic for 5-6 weeks -- but now back on the ozempic   Sometimes dosing insulin after he eats instead of before       BLOOD GLUCOSE MONITORING:  checking BG 5 times per day   Per oral recall:   Off the ozempic -- 200's   Recently -- 114 yesterday AM   170-- yesterday after food        HYPOGLYCEMIA:  rarely   Down to the 60's   Carries crackers / juice       MEALS: eating 3 meals per day     Diet green tea or water.   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   HENT:  Negative for trouble swallowing.    Eyes:  Negative for visual disturbance.   Respiratory:  Negative for shortness of breath.    Cardiovascular:  Negative for chest pain.   Gastrointestinal:  Negative for nausea.   Endocrine: Negative for polydipsia, polyphagia and polyuria.   Genitourinary:         No Nocturia    Musculoskeletal:  Negative for arthralgias.   Skin:  Negative for rash and wound.   Neurological:  Negative for numbness.   Psychiatric/Behavioral:  Negative for dysphoric mood.      Physical Exam   Physical Exam  Vitals and nursing note reviewed.   Constitutional:       Appearance: He is well-developed. He is obese.   HENT:      Head: Normocephalic and atraumatic.      Right Ear: External ear normal.      Left Ear: External ear normal.      Nose: Nose normal.   Neck:      Thyroid: No thyromegaly.      Trachea: No tracheal deviation.   Cardiovascular:      Rate and Rhythm: Normal rate and regular rhythm.      Heart sounds: No murmur heard.  Pulmonary:      Effort: Pulmonary effort is normal. No respiratory distress.      Breath sounds: Normal breath sounds.   Abdominal:      Palpations: Abdomen is soft.      Tenderness: There is no  abdominal tenderness.      Hernia: No hernia is present.   Musculoskeletal:      Cervical back: Normal range of motion and neck supple.   Skin:     General: Skin is warm and dry.      Capillary Refill: Capillary refill takes less than 2 seconds.      Findings: No rash.      Comments: injection sites with no complications.  No lipo hypertropthy or atrophy   Neurological:      Mental Status: He is alert and oriented to person, place, and time.      Cranial Nerves: No cranial nerve deficit.   Psychiatric:         Mood and Affect: Mood is not depressed. Affect is not tearful.         Behavior: Behavior normal.         Judgment: Judgment normal.       FOOT EXAMINATION:  Appropriate footwear         Lab Results   Component Value Date    TSH 1.970 06/10/2022           Type 2 diabetes mellitus with hyperglycemia, with long-term current use of insulin  Due for A1c check- plans to go to [facility] this week   Suspect A1c may be above goal as he was off of his Ozempic for 5-6 weeks due to insurance lapse.   + prandial excursions r/t dietary indiscretions and/ or missed doses of medications.   Cost is an issue with diabetes management.     Much better controlled on VGo- but insurance will not cover -- and dexcom       Now has new insurance ( [employer]) will check Dexcom G 7 coverage-- RX sent to pharmacy   dexcom G 7 sample applied in clinic today         Medication changes:     Continue Metformin 1000 mg 2 times per day      Continue Farxiga 10 mg daily,     Continue Ozempic 2 mg weekly      Continue Tresiba 42 units daily     Change Novolog to Fiasp 10-12 units TID AC , 3-5 units as needed for a snack, + correction scale goal 150, +1     Discussed compliance, discuss cost, discuss coupons      -- Reviewed goals of therapy are to get the best control we can without hypoglycemia  -- Reviewed patient's current insulin regimen. Clarified proper insulin dose and timing in relation to meals, etc. Insulin injection sites and proper rotation  instructed.    -- Advised frequent self blood glucose monitoring.  Patient encouraged to document glucose results and bring them to every clinic visit -   Discussed the importance of SBGM. He does better with a personal CGM. New insurance will submit for dexcom g 7   dexcom g 7 sample applied in clinic today   -- Hypoglycemia precautions discussed. Instructed on precautions before driving.    -- Call for Bg repeatedly < 70 or > 180.   -- Close adherence to lifestyle changes recommended.   -- Periodic follow ups for eye evaluations, foot care and dental care suggested.      Patient has diabetes mellitus and manages diabetes with intensive insulin regimen and uses prandial and basal insulin daily  Patient requires a therapeutic CGM and is willing to use therapeutic CGM for the necessary frequent adjustments of insulin therapy.  I have completed an in-person visit during the previous 6 months and will continue to have in-person visits every 6 months to assess adherence to their CGM regimen and diabetes treatment plan.  Due to COVID pandemic and need for remote monitoring this tool is medically necessary         Essential hypertension  BP goal is < 140/90.   Tolerating ACEi  Controlled   Blood pressure goals discussed with patient      DM type 2 with diabetic mixed hyperlipidemia  See above     Mixed hyperlipidemia  Off Lipitor due to arthralgia  Has not started Crestor yet  LDL goal < 100.     Obesity (BMI 30.0-34.9)  Body mass index is 32.79 kg/m².  Increases insulin resistance.   Discussed DM diet and exercise.         Spent 25 minutes with patient with > 50% time spent in counseling, as noted above on medications, diet, compliance, dexcom g , dexcom g 7 sample         Follow up in about 4 months (around 7/27/2023).  Dexcom G 7 sample today please   Follow up with me in 4 months   Print out lab orders for [facility] please -- he will go this week to get labs done at [facility] This Encounter   Procedures     Basic Metabolic Panel     Standing Status:   Future     Number of Occurrences:   1     Standing Expiration Date:   5/25/2024    Hemoglobin A1C     Standing Status:   Future     Number of Occurrences:   1     Standing Expiration Date:   5/25/2024    Lipid Panel     Standing Status:   Future     Number of Occurrences:   1     Standing Expiration Date:   5/25/2024    Ambulatory referral/consult to Optometry     Standing Status:   Future     Standing Expiration Date:   9/27/2024     Referral Priority:   Routine     Referral Type:   [facility] (Optometry)     Referral Reason:   Specialty Services Required     Requested Specialty:   Optometry     Number of Visits Requested:   1         Recommendations were discussed with the patient in detail  The patient verbalized understanding and agrees with the plan outlined as above.

## 2023-04-25 NOTE — TELEPHONE ENCOUNTER
----- Message from Love Miller sent at 3/28/2018  3:28 PM CDT -----   Cedrick, patient [phone], Returning a call from the office, may be for results on urine test. IM to office, transferred to POD. Thanks.   25-Apr-2023

## 2023-05-27 NOTE — TELEPHONE ENCOUNTER
Care Due:                  Date            Visit Type   Department     Provider  --------------------------------------------------------------------------------                                EP -                              PRIMARY      [facility]  Last Visit: 03-      CARE ([facility])   PRIMARY CARE   Alex Mckoy  Next Visit: None Scheduled  None         None Found                                                            Last  Test          Frequency    Reason                     Performed    Due Date  --------------------------------------------------------------------------------    CMP.........  12 months..  rosuvastatin.............  06-   06-    Lipid Panel.  12 months..  rosuvastatin.............  06-   06-    [employer] Embedded Care Due Messages. Reference number: [id number].   5/27/2023 3:10:04 PM CDT

## 2023-05-29 RX ORDER — LISINOPRIL AND HYDROCHLOROTHIAZIDE 12.5; 2 MG/1; MG/1
1 TABLET ORAL DAILY
Qty: 90 TABLET | Refills: 1 | Status: SHIPPED | OUTPATIENT
Start: 2023-05-29 | End: 2023-12-14 | Stop reason: SDUPTHER

## 2023-05-29 NOTE — TELEPHONE ENCOUNTER
Refill Decision Note   Cedrick Saunders  is requesting a refill authorization.  Brief Assessment and Rationale for Refill:  Approve     Medication Therapy Plan:       Medication Reconciliation Completed: No   Comments:     Provider Staff:     Action is required for this patient.   Please see care gap opportunities below in Care Due Message.     Thanks!  [facility]     Appointments      Date Provider   Last Visit   3/20/2023 Alex Mckoy MD   Next Visit   Visit date not found Alex Mckoy MD     Note composed:4:06 AM 05/29/2023           Note composed:4:06 AM 05/29/2023

## 2023-05-31 RX ORDER — ESCITALOPRAM OXALATE 10 MG/1
TABLET ORAL
Qty: 90 TABLET | Refills: 3 | Status: SHIPPED | OUTPATIENT
Start: 2023-05-31

## 2023-05-31 NOTE — TELEPHONE ENCOUNTER
Refill Decision Note   Cedrick Saunders  is requesting a refill authorization.  Brief Assessment and Rationale for Refill:  Approve     Medication Therapy Plan:       Medication Reconciliation Completed: No   Comments:     No Care Gaps recommended.     Note composed:11:44 AM 05/31/2023

## 2023-05-31 NOTE — TELEPHONE ENCOUNTER
No care due was identified.  [facility] Embedded Care Due Messages. Reference number: [id number].   5/31/2023 10:02:07 AM CDT

## 2023-06-06 ENCOUNTER — TELEPHONE (OUTPATIENT)
Dept: DIABETES | Facility: CLINIC | Age: 58
End: 2023-06-06
Payer: COMMERCIAL

## 2023-06-06 DIAGNOSIS — E11.65 TYPE 2 DIABETES MELLITUS WITH HYPERGLYCEMIA, WITH LONG-TERM CURRENT USE OF INSULIN: Chronic | ICD-10-CM

## 2023-06-06 DIAGNOSIS — Z79.4 TYPE 2 DIABETES MELLITUS WITH HYPERGLYCEMIA, WITH LONG-TERM CURRENT USE OF INSULIN: Chronic | ICD-10-CM

## 2023-06-06 RX ORDER — BLOOD-GLUCOSE SENSOR
1 EACH MISCELLANEOUS
Qty: 3 EACH | Refills: 11 | Status: SHIPPED | OUTPATIENT
Start: 2023-06-06 | End: 2023-10-13

## 2023-06-06 NOTE — TELEPHONE ENCOUNTER
He needs to call Dexcom technical support to let them know that he had a couple of sensors that went bad so that they can send him replacements

## 2023-07-05 DIAGNOSIS — Z79.4 TYPE 2 DIABETES MELLITUS WITH HYPERGLYCEMIA, WITH LONG-TERM CURRENT USE OF INSULIN: Chronic | ICD-10-CM

## 2023-07-05 DIAGNOSIS — E11.65 TYPE 2 DIABETES MELLITUS WITH HYPERGLYCEMIA, WITH LONG-TERM CURRENT USE OF INSULIN: Chronic | ICD-10-CM

## 2023-07-05 RX ORDER — INSULIN ASPART INJECTION 100 [IU]/ML
INJECTION, SOLUTION SUBCUTANEOUS
Qty: 15 PEN | Refills: 3 | Status: SHIPPED | OUTPATIENT
Start: 2023-07-05 | End: 2024-03-07

## 2023-07-10 ENCOUNTER — PATIENT MESSAGE (OUTPATIENT)
Dept: ADMINISTRATIVE | Facility: HOSPITAL | Age: 58
End: 2023-07-10
Payer: COMMERCIAL

## 2023-07-10 DIAGNOSIS — E11.9 TYPE 2 DIABETES MELLITUS WITHOUT COMPLICATION, UNSPECIFIED WHETHER LONG TERM INSULIN USE: ICD-10-CM

## 2023-07-28 ENCOUNTER — PATIENT MESSAGE (OUTPATIENT)
Dept: DIABETES | Facility: CLINIC | Age: 58
End: 2023-07-28

## 2023-07-28 ENCOUNTER — OFFICE VISIT (OUTPATIENT)
Dept: DIABETES | Facility: CLINIC | Age: 58
End: 2023-07-28
Payer: COMMERCIAL

## 2023-07-28 VITALS
DIASTOLIC BLOOD PRESSURE: 67 MMHG | BODY MASS INDEX: 33.05 KG/M2 | WEIGHT: 244 LBS | HEIGHT: 72 IN | OXYGEN SATURATION: 97 % | HEART RATE: 77 BPM | SYSTOLIC BLOOD PRESSURE: 117 MMHG

## 2023-07-28 DIAGNOSIS — Z59.9 FINANCIAL DIFFICULTIES: ICD-10-CM

## 2023-07-28 DIAGNOSIS — E78.2 MIXED HYPERLIPIDEMIA: ICD-10-CM

## 2023-07-28 DIAGNOSIS — E66.09 CLASS 1 OBESITY DUE TO EXCESS CALORIES WITH SERIOUS COMORBIDITY AND BODY MASS INDEX (BMI) OF 33.0 TO 33.9 IN ADULT: ICD-10-CM

## 2023-07-28 DIAGNOSIS — E11.69 DM TYPE 2 WITH DIABETIC MIXED HYPERLIPIDEMIA: ICD-10-CM

## 2023-07-28 DIAGNOSIS — E78.2 DM TYPE 2 WITH DIABETIC MIXED HYPERLIPIDEMIA: ICD-10-CM

## 2023-07-28 DIAGNOSIS — Z91.148 NONCOMPLIANCE W/MEDICATION TREATMENT DUE TO INTERMIT USE OF MEDICATION: ICD-10-CM

## 2023-07-28 DIAGNOSIS — I10 ESSENTIAL HYPERTENSION: ICD-10-CM

## 2023-07-28 DIAGNOSIS — E11.65 TYPE 2 DIABETES MELLITUS WITH HYPERGLYCEMIA, WITH LONG-TERM CURRENT USE OF INSULIN: Primary | Chronic | ICD-10-CM

## 2023-07-28 DIAGNOSIS — Z79.4 TYPE 2 DIABETES MELLITUS WITH HYPERGLYCEMIA, WITH LONG-TERM CURRENT USE OF INSULIN: Primary | Chronic | ICD-10-CM

## 2023-07-28 DIAGNOSIS — Z71.9 HEALTH EDUCATION/COUNSELING: ICD-10-CM

## 2023-07-28 DIAGNOSIS — Z91.199 NONCOMPLIANCE WITH DIABETES TREATMENT: ICD-10-CM

## 2023-07-28 PROCEDURE — 3008F BODY MASS INDEX DOCD: CPT | Mod: CPTII,S$GLB,, | Performed by: NURSE PRACTITIONER

## 2023-07-28 PROCEDURE — 3078F DIAST BP <80 MM HG: CPT | Mod: CPTII,S$GLB,, | Performed by: NURSE PRACTITIONER

## 2023-07-28 PROCEDURE — 3078F PR MOST RECENT DIASTOLIC BLOOD PRESSURE < 80 MM HG: ICD-10-PCS | Mod: CPTII,S$GLB,, | Performed by: NURSE PRACTITIONER

## 2023-07-28 PROCEDURE — 4010F ACE/ARB THERAPY RXD/TAKEN: CPT | Mod: CPTII,S$GLB,, | Performed by: NURSE PRACTITIONER

## 2023-07-28 PROCEDURE — 1160F RVW MEDS BY RX/DR IN RCRD: CPT | Mod: CPTII,S$GLB,, | Performed by: NURSE PRACTITIONER

## 2023-07-28 PROCEDURE — 99214 PR OFFICE/OUTPT VISIT, EST, LEVL IV, 30-39 MIN: ICD-10-PCS | Mod: S$GLB,,, | Performed by: NURSE PRACTITIONER

## 2023-07-28 PROCEDURE — 95251 PR GLUCOSE MONITOR, 72 HOUR, PHYS INTERP: ICD-10-PCS | Mod: S$GLB,,, | Performed by: NURSE PRACTITIONER

## 2023-07-28 PROCEDURE — 1159F MED LIST DOCD IN RCRD: CPT | Mod: CPTII,S$GLB,, | Performed by: NURSE PRACTITIONER

## 2023-07-28 PROCEDURE — 1159F PR MEDICATION LIST DOCUMENTED IN MEDICAL RECORD: ICD-10-PCS | Mod: CPTII,S$GLB,, | Performed by: NURSE PRACTITIONER

## 2023-07-28 PROCEDURE — 99999 PR PBB SHADOW E&M-EST. PATIENT-LVL V: CPT | Mod: PBBFAC,,, | Performed by: NURSE PRACTITIONER

## 2023-07-28 PROCEDURE — 99999 PR PBB SHADOW E&M-EST. PATIENT-LVL V: ICD-10-PCS | Mod: PBBFAC,,, | Performed by: NURSE PRACTITIONER

## 2023-07-28 PROCEDURE — 3074F SYST BP LT 130 MM HG: CPT | Mod: CPTII,S$GLB,, | Performed by: NURSE PRACTITIONER

## 2023-07-28 PROCEDURE — 3008F PR BODY MASS INDEX (BMI) DOCUMENTED: ICD-10-PCS | Mod: CPTII,S$GLB,, | Performed by: NURSE PRACTITIONER

## 2023-07-28 PROCEDURE — 99214 OFFICE O/P EST MOD 30 MIN: CPT | Mod: S$GLB,,, | Performed by: NURSE PRACTITIONER

## 2023-07-28 PROCEDURE — 1160F PR REVIEW ALL MEDS BY PRESCRIBER/CLIN PHARMACIST DOCUMENTED: ICD-10-PCS | Mod: CPTII,S$GLB,, | Performed by: NURSE PRACTITIONER

## 2023-07-28 PROCEDURE — 95251 CONT GLUC MNTR ANALYSIS I&R: CPT | Mod: S$GLB,,, | Performed by: NURSE PRACTITIONER

## 2023-07-28 PROCEDURE — 3074F PR MOST RECENT SYSTOLIC BLOOD PRESSURE < 130 MM HG: ICD-10-PCS | Mod: CPTII,S$GLB,, | Performed by: NURSE PRACTITIONER

## 2023-07-28 PROCEDURE — 4010F PR ACE/ARB THEARPY RXD/TAKEN: ICD-10-PCS | Mod: CPTII,S$GLB,, | Performed by: NURSE PRACTITIONER

## 2023-07-28 RX ORDER — TIRZEPATIDE 5 MG/.5ML
5 INJECTION, SOLUTION SUBCUTANEOUS
Qty: 4 PEN | Refills: 0 | Status: SHIPPED | OUTPATIENT
Start: 2023-07-28 | End: 2023-08-22

## 2023-07-28 RX ORDER — SUB-Q INSULIN DEVICE, 40 UNIT
1 EACH MISCELLANEOUS DAILY
Qty: 30 EACH | Refills: 6 | Status: SHIPPED | OUTPATIENT
Start: 2023-07-28 | End: 2023-09-20 | Stop reason: SDUPTHER

## 2023-07-28 RX ORDER — INSULIN ASPART INJECTION 100 [IU]/ML
INJECTION, SOLUTION SUBCUTANEOUS
Qty: 30 ML | Refills: 6 | Status: SHIPPED | OUTPATIENT
Start: 2023-07-28 | End: 2023-11-14 | Stop reason: SDUPTHER

## 2023-07-28 SDOH — SOCIAL DETERMINANTS OF HEALTH (SDOH): PROBLEM RELATED TO HOUSING AND ECONOMIC CIRCUMSTANCES, UNSPECIFIED: Z59.9

## 2023-07-28 NOTE — ASSESSMENT & PLAN NOTE
Due for A1c check- plan to go Monday or Tuesday to get labs done   Suspect A1c may be above goal -- based on dexcom download   + prandial excursions r/t dietary indiscretions and/ or missed doses of medications.   Cost is an issue with diabetes management.     Much better controlled on VGo-- will see if new insurance will cover       He would like to change Ozempic to Mounjaro       Medication changes:     Change ozempic to Mounjaro   Start with Mounjaro 5 mg weekly   Increase monthly as tolerated and as needed     We will see if we can do the VGo --VGo 40 with Fiasp insulin 5-6 clicks with meals   If VGo is not covered - will change to Omnipod 5       Continue Metformin 1000 mg 2 times per day      Continue Farxiga 10 mg daily,     C  Continue Tresiba 42 units daily     Continue Fiasp 16-20  units TID AC , 3-5 units as needed for a snack, + correction scale goal 150, +1     Discussed compliance, discuss cost, discuss coupons      -- Reviewed goals of therapy are to get the best control we can without hypoglycemia  -- Reviewed patient's current insulin regimen. Clarified proper insulin dose and timing in relation to meals, etc. Insulin injection sites and proper rotation instructed.    -- Advised frequent self blood glucose monitoring.  Patient encouraged to document glucose results and bring them to every clinic visit -   Continue Dexocm G7 -- supplies from pharmacy -- may need to change to dexcom g6 if he wants Omnipod 5   -- Hypoglycemia precautions discussed. Instructed on precautions before driving.    -- Call for Bg repeatedly < 70 or > 180.   -- Close adherence to lifestyle changes recommended.   -- Periodic follow ups for eye evaluations, foot care and dental care suggested.      Patient has diabetes mellitus and manages diabetes with intensive insulin regimen and uses prandial and basal insulin daily  Patient requires a therapeutic CGM and is willing to use therapeutic CGM for the necessary frequent  adjustments of insulin therapy.  I have completed an in-person visit during the previous 6 months and will continue to have in-person visits every 6 months to assess adherence to their CGM regimen and diabetes treatment plan.  Due to COVID pandemic and need for remote monitoring this tool is medically necessary

## 2023-07-28 NOTE — PATIENT INSTRUCTIONS
Change ozempic to Mounjaro   Start with Mounjaro 5 mg weekly   Increase monthly as tolerated and as needed     We will see if we can do the VGo

## 2023-07-28 NOTE — ASSESSMENT & PLAN NOTE
Body mass index is 33.09 kg/m².  Increases insulin resistance.   Discussed DM diet and exercise.

## 2023-07-28 NOTE — PROGRESS NOTES
CC:   Chief Complaint   Patient presents with    Diabetes Mellitus       HPI: Cedrick Saunders is a 57 y.o. male presents for a follow up visit today for the management of T2DM.     He was diagnosed with Type 2 diabetes at age 37 with s/s of polyuria, nocturia, and polydipsia and BG was 600. A1c at diagnosis was > 12%. He was initially on oral medications Actos and Metformin. Insulin therapy was started in 2016.     Family hx of diabetes: father, paternal grandmother   Hospitalized for diabetes: denies     No personal or FH of thyroid cancer or personal of pancreatic cancer or pancreatitis.       Our last visit was in March 2023  Having a hard time getting the dexcom G7 to stay on   Supplies come from the pharmacy for the dexcom G7   He wants to change his Ozempic to Mounjaro   He is due for A1c level   Missing doses of insulin   May be interested in either getting back on VGo or changing to Omnipod 5-- with dexcom G6           DIABETES COMPLICATIONS: none      Diabetes Management Status    ASA:  Yes - 81 mg daily     Statin: On Crestor 5 mg nightly yet-- stopped Lipitor due to arthralgia -- and joint pain resolved with stopping the Lipitor.   ACE/ARB: Taking- Lisinopril 20 mg     The ASCVD Risk score (Leonie ZIEGLER, et al., 2019) failed to calculate for the following reasons:    The valid total cholesterol range is 130 to 320 mg/dL      Screening or Prevention Patient's value Goal Complete/Controlled?   HgA1C Testing and Control   Lab Results   Component Value Date    HGBA1C 7.4 (H) 10/11/2022      Annually/Less than 8% No   Lipid profile : 06/10/2022 Annually Yes   LDL control Lab Results   Component Value Date    LDLCALC 61.0 (L) 06/10/2022    Annually/Less than 100 mg/dl  Yes   Nephropathy screening Lab Results   Component Value Date    LABMICR <5.0 10/11/2022     Lab Results   Component Value Date    PROTEINUA Trace (A) 05/19/2006    Annually Yes   Blood pressure BP Readings from Last 1 Encounters:    07/28/23 117/67    Less than 140/90 Yes   Dilated retinal exam : 03/24/2022- Annually No   Foot exam   : 10/17/2022 Annually Yes       CURRENT A1C:    Hemoglobin A1C   Date Value Ref Range Status   10/11/2022 7.4 (H) 4.0 - 5.6 % Final     Comment:     ADA Screening Guidelines:  5.7-6.4%  Consistent with prediabetes  >or=6.5%  Consistent with diabetes    High levels of fetal hemoglobin interfere with the HbA1C  assay. Heterozygous hemoglobin variants (HbS, HgC, etc)do  not significantly interfere with this assay.   However, presence of multiple variants may affect accuracy.     06/10/2022 8.0 (H) 4.0 - 5.6 % Final     Comment:     ADA Screening Guidelines:  5.7-6.4%  Consistent with prediabetes  >or=6.5%  Consistent with diabetes    High levels of fetal hemoglobin interfere with the HbA1C  assay. Heterozygous hemoglobin variants (HbS, HgC, etc)do  not significantly interfere with this assay.   However, presence of multiple variants may affect accuracy.     04/13/2022 8.2 (H) 4.0 - 5.6 % Final     Comment:     ADA Screening Guidelines:  5.7-6.4%  Consistent with prediabetes  >or=6.5%  Consistent with diabetes    High levels of fetal hemoglobin interfere with the HbA1C  assay. Heterozygous hemoglobin variants (HbS, HgC, etc)do  not significantly interfere with this assay.   However, presence of multiple variants may affect accuracy.         GOAL A1C: 7% or less without hypoglycemia.       DM MEDICATIONS USED IN THE PAST: Metformin, Januvia, Basaglar, Farxiga, Glyburide, Actos   Invokana- yeast infections, Tresiba, Ozempic   VGo  Novolog   Dexcom -- off due to cost   Fiasp --      CURRENT DIABETES MEDICATIONS: Metformin 1000 mg BID, Farxiga 10 mg daily, Ozempic 2 mg weekly (Fridays), Tresiba 42 units nightly ( Bedtime-- 9-10 PM) and Fiasp 16-20 units with meals--- sometimes 24 units with cheat meal   Insulin: pens   Missed doses: oss missing doses       BLOOD GLUCOSE MONITORING:    Sensor type: Dexcom G7   Average BG  readin  Time in range: 40%   37% high   23% very high   0% low   0% very low   Site change:  q10 days    2 weeks prior -- BG was 172 on average     Supplies with pharmacy     Sensor was downloaded in clinic today and reviewed with patient.   Please see attached document for download.         HYPOGLYCEMIA:  rarely   Down to the 60's   Carries crackers / juice       MEALS: eating 3 meals per day     Diet green tea or water.   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   HENT:  Negative for trouble swallowing.    Eyes:  Negative for visual disturbance.   Respiratory:  Negative for shortness of breath.    Cardiovascular:  Negative for chest pain.   Gastrointestinal:  Negative for nausea.   Endocrine: Negative for polydipsia, polyphagia and polyuria.   Genitourinary:         No Nocturia    Musculoskeletal:  Negative for arthralgias.   Skin:  Negative for rash and wound.   Neurological:  Negative for numbness.   Psychiatric/Behavioral:  Negative for dysphoric mood.      Physical Exam   Physical Exam  Vitals and nursing note reviewed.   Constitutional:       Appearance: He is well-developed. He is obese.   HENT:      Head: Normocephalic and atraumatic.      Right Ear: External ear normal.      Left Ear: External ear normal.      Nose: Nose normal.   Neck:      Thyroid: No thyromegaly.      Trachea: No tracheal deviation.   Cardiovascular:      Rate and Rhythm: Normal rate and regular rhythm.      Heart sounds: No murmur heard.  Pulmonary:      Effort: Pulmonary effort is normal. No respiratory distress.      Breath sounds: Normal breath sounds.   Abdominal:      Palpations: Abdomen is soft.      Tenderness: There is no abdominal tenderness.      Hernia: No hernia is present.   Musculoskeletal:      Cervical back: Normal  range of motion and neck supple.   Skin:     General: Skin is warm and dry.      Capillary Refill: Capillary refill takes less than 2 seconds.      Findings: No rash.      Comments: injection sites with no complications.  No lipo hypertropthy or atrophy   Neurological:      Mental Status: He is alert and oriented to person, place, and time.      Cranial Nerves: No cranial nerve deficit.   Psychiatric:         Mood and Affect: Mood is not depressed. Affect is not tearful.         Behavior: Behavior normal.         Judgment: Judgment normal.       FOOT EXAMINATION:  Appropriate footwear         Lab Results   Component Value Date    TSH 1.970 06/10/2022           Type 2 diabetes mellitus with hyperglycemia, with long-term current use of insulin  Due for A1c check- plan to go Monday or Tuesday to get labs done   Suspect A1c may be above goal -- based on dexcom download   + prandial excursions r/t dietary indiscretions and/ or missed doses of medications.   Cost is an issue with diabetes management.     Much better controlled on VGo-- will see if new insurance will cover       He would like to change Ozempic to Mounjaro       Medication changes:     Change ozempic to Mounjaro   Start with Mounjaro 5 mg weekly   Increase monthly as tolerated and as needed     We will see if we can do the VGo --VGo 40 with Fiasp insulin 5-6 clicks with meals   If VGo is not covered - will change to Omnipod 5       Continue Metformin 1000 mg 2 times per day      Continue Farxiga 10 mg daily,     C  Continue Tresiba 42 units daily     Continue Fiasp 16-20  units TID AC , 3-5 units as needed for a snack, + correction scale goal 150, +1     Discussed compliance, discuss cost, discuss coupons      -- Reviewed goals of therapy are to get the best control we can without hypoglycemia  -- Reviewed patient's current insulin regimen. Clarified proper insulin dose and timing in relation to meals, etc. Insulin injection sites and proper rotation  instructed.    -- Advised frequent self blood glucose monitoring.  Patient encouraged to document glucose results and bring them to every clinic visit -   Continue Dexocm G7 -- supplies from pharmacy -- may need to change to dexcom g6 if he wants Omnipod 5   -- Hypoglycemia precautions discussed. Instructed on precautions before driving.    -- Call for Bg repeatedly < 70 or > 180.   -- Close adherence to lifestyle changes recommended.   -- Periodic follow ups for eye evaluations, foot care and dental care suggested.      Patient has diabetes mellitus and manages diabetes with intensive insulin regimen and uses prandial and basal insulin daily  Patient requires a therapeutic CGM and is willing to use therapeutic CGM for the necessary frequent adjustments of insulin therapy.  I have completed an in-person visit during the previous 6 months and will continue to have in-person visits every 6 months to assess adherence to their CGM regimen and diabetes treatment plan.  Due to COVID pandemic and need for remote monitoring this tool is medically necessary         Essential hypertension  BP goal is < 140/90.   Tolerating ACEi  Controlled   Blood pressure goals discussed with patient      DM type 2 with diabetic mixed hyperlipidemia  See above     Mixed hyperlipidemia  On statin per ADA recommendations  LDL goal < 100.  Check lipids       Class 1 obesity due to excess calories with serious comorbidity and body mass index (BMI) of 33.0 to 33.9 in adult  Body mass index is 33.09 kg/m².  Increases insulin resistance.   Discussed DM diet and exercise.       I spent a total of 30 minutes on the day of the visit.This includes face to face time and non-face to face time preparing to see the patient (eg, review of tests), obtaining and/or reviewing separately obtained history, documenting clinical information in the electronic or other health record, independently interpreting results and communicating results to the  patient/family/caregiver, or care coordinator.        Follow up in about 3 months (around 10/28/2023).  Schedule labs on Monday or Tuesday please  Follow up with me in 3 months      Orders Placed This Encounter   Procedures    CBC Auto Differential     Standing Status:   Future     Standing Expiration Date:   1/28/2025    Comprehensive Metabolic Panel     Standing Status:   Future     Standing Expiration Date:   1/28/2025    Hemoglobin A1C     Standing Status:   Future     Standing Expiration Date:   1/28/2025    Lipid Panel     Standing Status:   Future     Standing Expiration Date:   1/28/2025    Microalbumin/Creatinine Ratio, Urine     Standing Status:   Future     Standing Expiration Date:   1/28/2025     Order Specific Question:   Specimen Source     Answer:   Urine    TSH     Standing Status:   Future     Standing Expiration Date:   1/28/2025         Recommendations were discussed with the patient in detail  The patient verbalized understanding and agrees with the plan outlined as above.

## 2023-07-31 ENCOUNTER — TELEPHONE (OUTPATIENT)
Dept: DIABETES | Facility: CLINIC | Age: 58
End: 2023-07-31
Payer: COMMERCIAL

## 2023-08-02 ENCOUNTER — TELEPHONE (OUTPATIENT)
Dept: DIABETES | Facility: CLINIC | Age: 58
End: 2023-08-02
Payer: COMMERCIAL

## 2023-08-02 ENCOUNTER — PATIENT MESSAGE (OUTPATIENT)
Dept: DIABETES | Facility: CLINIC | Age: 58
End: 2023-08-02
Payer: COMMERCIAL

## 2023-08-03 ENCOUNTER — TELEPHONE (OUTPATIENT)
Dept: DIABETES | Facility: CLINIC | Age: 58
End: 2023-08-03
Payer: COMMERCIAL

## 2023-08-10 ENCOUNTER — PATIENT MESSAGE (OUTPATIENT)
Dept: DIABETES | Facility: CLINIC | Age: 58
End: 2023-08-10
Payer: COMMERCIAL

## 2023-08-11 ENCOUNTER — PATIENT MESSAGE (OUTPATIENT)
Dept: DIABETES | Facility: CLINIC | Age: 58
End: 2023-08-11
Payer: COMMERCIAL

## 2023-08-17 LAB
LEFT EYE DM RETINOPATHY: NEGATIVE
RIGHT EYE DM RETINOPATHY: NEGATIVE

## 2023-08-18 ENCOUNTER — PATIENT OUTREACH (OUTPATIENT)
Dept: ADMINISTRATIVE | Facility: HOSPITAL | Age: 58
End: 2023-08-18
Payer: COMMERCIAL

## 2023-08-18 ENCOUNTER — PATIENT MESSAGE (OUTPATIENT)
Dept: DIABETES | Facility: CLINIC | Age: 58
End: 2023-08-18
Payer: COMMERCIAL

## 2023-08-18 NOTE — PROGRESS NOTES
Health Maintenance Due   Topic Date Due    Shingles Vaccine (1 of 2) Never done    COVID-19 Vaccine (4 - Pfizer series) 03/11/2022    PROSTATE-SPECIFIC ANTIGEN  06/10/2023    Foot Exam  10/17/2023     Immunizations - reviewed and updated   Care Everywhere - triggered   Care Teams - updated   Outreach - Received eye exam done with Dr. Madyson George 8/17/2023, uploaded to .

## 2023-08-21 DIAGNOSIS — E11.65 TYPE 2 DIABETES MELLITUS WITH HYPERGLYCEMIA, WITH LONG-TERM CURRENT USE OF INSULIN: Chronic | ICD-10-CM

## 2023-08-21 DIAGNOSIS — Z79.4 TYPE 2 DIABETES MELLITUS WITH HYPERGLYCEMIA, WITH LONG-TERM CURRENT USE OF INSULIN: Chronic | ICD-10-CM

## 2023-08-22 ENCOUNTER — PATIENT MESSAGE (OUTPATIENT)
Dept: DIABETES | Facility: CLINIC | Age: 58
End: 2023-08-22
Payer: COMMERCIAL

## 2023-08-22 RX ORDER — TIRZEPATIDE 7.5 MG/.5ML
7.5 INJECTION, SOLUTION SUBCUTANEOUS
Qty: 4 PEN | Refills: 1 | Status: SHIPPED | OUTPATIENT
Start: 2023-08-22 | End: 2023-10-17 | Stop reason: SDUPTHER

## 2023-08-22 RX ORDER — TIRZEPATIDE 5 MG/.5ML
5 INJECTION, SOLUTION SUBCUTANEOUS
Qty: 4 PEN | Refills: 0 | OUTPATIENT
Start: 2023-08-22

## 2023-09-15 ENCOUNTER — TELEPHONE (OUTPATIENT)
Dept: DIABETES | Facility: CLINIC | Age: 58
End: 2023-09-15
Payer: COMMERCIAL

## 2023-09-15 NOTE — TELEPHONE ENCOUNTER
----- Message from Daniela Daly sent at 9/15/2023  2:01 PM CDT -----  Contact: [employer]/Denisa [phone] Opt 3  Stated that they have the Vgo pump ready to ship but has been unable to contact the patient.  Thank you

## 2023-09-18 ENCOUNTER — PATIENT MESSAGE (OUTPATIENT)
Dept: PRIMARY CARE CLINIC | Facility: CLINIC | Age: 58
End: 2023-09-18
Payer: COMMERCIAL

## 2023-09-20 DIAGNOSIS — E11.65 TYPE 2 DIABETES MELLITUS WITH HYPERGLYCEMIA, WITH LONG-TERM CURRENT USE OF INSULIN: Primary | Chronic | ICD-10-CM

## 2023-09-20 DIAGNOSIS — E11.65 TYPE 2 DIABETES MELLITUS WITH HYPERGLYCEMIA, WITH LONG-TERM CURRENT USE OF INSULIN: Chronic | ICD-10-CM

## 2023-09-20 DIAGNOSIS — Z79.4 TYPE 2 DIABETES MELLITUS WITH HYPERGLYCEMIA, WITH LONG-TERM CURRENT USE OF INSULIN: Primary | Chronic | ICD-10-CM

## 2023-09-20 DIAGNOSIS — Z79.4 TYPE 2 DIABETES MELLITUS WITH HYPERGLYCEMIA, WITH LONG-TERM CURRENT USE OF INSULIN: Chronic | ICD-10-CM

## 2023-09-20 RX ORDER — INSULIN PMP CART,AUT,G6/7,CNTR
1 EACH SUBCUTANEOUS
Qty: 1 EACH | Refills: 0 | Status: SHIPPED | OUTPATIENT
Start: 2023-09-20 | End: 2023-11-14

## 2023-09-20 RX ORDER — INSULIN PMP CART,AUT,G6/7,CNTR
1 EACH SUBCUTANEOUS
Qty: 2 EACH | Refills: 11 | Status: SHIPPED | OUTPATIENT
Start: 2023-09-20 | End: 2023-11-14 | Stop reason: SDUPTHER

## 2023-09-20 RX ORDER — SUB-Q INSULIN DEVICE, 40 UNIT
1 EACH MISCELLANEOUS DAILY
Qty: 30 EACH | Refills: 6 | Status: SHIPPED | OUTPATIENT
Start: 2023-09-20 | End: 2023-11-14

## 2023-09-20 NOTE — TELEPHONE ENCOUNTER
Patient is concerned about the cost of the V Go  He is not sure he can pay 75 dollars per month  He would like to check the cost of the Omnipod so I have sent the Omnipod orders over to [facility]  Can we please do an Omnipod PA

## 2023-09-20 NOTE — TELEPHONE ENCOUNTER
----- Message from Kennedi Christina LPN sent at 9/20/2023  3:55 PM CDT -----  [place] [facility] [place] have sent a refill request he will have to pay 75 dollars, I messaged him before to let him know this but he never replied, Ashley stated she also called and left a VM  but he never called back

## 2023-09-28 ENCOUNTER — PATIENT MESSAGE (OUTPATIENT)
Dept: DIABETES | Facility: CLINIC | Age: 58
End: 2023-09-28
Payer: COMMERCIAL

## 2023-09-28 ENCOUNTER — TELEPHONE (OUTPATIENT)
Dept: DIABETES | Facility: CLINIC | Age: 58
End: 2023-09-28
Payer: COMMERCIAL

## 2023-09-28 NOTE — TELEPHONE ENCOUNTER
Spoke to [facility] stated that pt PA was approved for omnipod stated that pt intro kit is $70 and pods are $70 , stated they tried reaching out to pt three times to set up shipment , with no answer

## 2023-09-30 ENCOUNTER — HOSPITAL ENCOUNTER (EMERGENCY)
Facility: HOSPITAL | Age: 58
Discharge: HOME | End: 2023-09-30
Attending: EMERGENCY MEDICINE
Payer: COMMERCIAL

## 2023-09-30 VITALS
OXYGEN SATURATION: 100 % | HEIGHT: 72 IN | BODY MASS INDEX: 33.05 KG/M2 | RESPIRATION RATE: 16 BRPM | TEMPERATURE: 98 F | HEART RATE: 60 BPM | DIASTOLIC BLOOD PRESSURE: 87 MMHG | SYSTOLIC BLOOD PRESSURE: 140 MMHG | WEIGHT: 244 LBS

## 2023-09-30 DIAGNOSIS — M25.562 LEFT KNEE PAIN: ICD-10-CM

## 2023-09-30 DIAGNOSIS — M25.531 RIGHT WRIST PAIN: ICD-10-CM

## 2023-09-30 LAB — POCT GLUCOSE: 94 MG/DL (ref 70–110)

## 2023-09-30 PROCEDURE — 25000003 PHARM REV CODE 250

## 2023-09-30 PROCEDURE — 82962 GLUCOSE BLOOD TEST: CPT

## 2023-09-30 PROCEDURE — 99285 EMERGENCY DEPT VISIT HI MDM: CPT | Mod: 25

## 2023-09-30 RX ORDER — ACETAMINOPHEN 325 MG/1
650 TABLET ORAL
Status: COMPLETED | OUTPATIENT
Start: 2023-09-30 | End: 2023-09-30

## 2023-09-30 RX ORDER — METHOCARBAMOL 500 MG/1
500 TABLET, FILM COATED ORAL 3 TIMES DAILY
Qty: 15 TABLET | Refills: 0 | Status: SHIPPED | OUTPATIENT
Start: 2023-09-30 | End: 2023-10-05

## 2023-09-30 RX ORDER — METHOCARBAMOL 500 MG/1
500 TABLET, FILM COATED ORAL
Status: COMPLETED | OUTPATIENT
Start: 2023-09-30 | End: 2023-09-30

## 2023-09-30 RX ADMIN — METHOCARBAMOL 500 MG: 500 TABLET ORAL at 01:09

## 2023-09-30 RX ADMIN — ACETAMINOPHEN 650 MG: 325 TABLET ORAL at 01:09

## 2023-09-30 NOTE — DISCHARGE INSTRUCTIONS
Please return to the emergency department if you develop any new or worsening symptoms.  This includes worsening pain, altered mental status, vision changes.     Otherwise follow-up with your primary care physician in 1 week to discuss her most recent ED visit.    I am prescribing Robaxin for 5 days as needed for muscle pains.  You should not be driving or operating heavy machinery while taking this medication.  For any breakthrough pain please use an alternating regimen of ibuprofen and acetaminophen.
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
## 2023-10-10 ENCOUNTER — CLINICAL SUPPORT (OUTPATIENT)
Dept: DIABETES | Facility: CLINIC | Age: 58
End: 2023-10-10
Payer: COMMERCIAL

## 2023-10-10 DIAGNOSIS — E11.69 DM TYPE 2 WITH DIABETIC MIXED HYPERLIPIDEMIA: ICD-10-CM

## 2023-10-10 DIAGNOSIS — E78.2 DM TYPE 2 WITH DIABETIC MIXED HYPERLIPIDEMIA: ICD-10-CM

## 2023-10-10 DIAGNOSIS — Z79.4 TYPE 2 DIABETES MELLITUS WITH HYPERGLYCEMIA, WITH LONG-TERM CURRENT USE OF INSULIN: Primary | ICD-10-CM

## 2023-10-10 DIAGNOSIS — E11.65 TYPE 2 DIABETES MELLITUS WITH HYPERGLYCEMIA, WITH LONG-TERM CURRENT USE OF INSULIN: Primary | ICD-10-CM

## 2023-10-10 PROCEDURE — G0108 PR DIAB MANAGE TRN  PER INDIV: ICD-10-PCS | Mod: S$GLB,,, | Performed by: DIETITIAN, REGISTERED

## 2023-10-10 PROCEDURE — G0108 DIAB MANAGE TRN  PER INDIV: HCPCS | Mod: S$GLB,,, | Performed by: DIETITIAN, REGISTERED

## 2023-10-10 PROCEDURE — 99999 PR PBB SHADOW E&M-EST. PATIENT-LVL II: ICD-10-PCS | Mod: PBBFAC,,, | Performed by: DIETITIAN, REGISTERED

## 2023-10-10 PROCEDURE — 99999 PR PBB SHADOW E&M-EST. PATIENT-LVL II: CPT | Mod: PBBFAC,,, | Performed by: DIETITIAN, REGISTERED

## 2023-10-13 ENCOUNTER — TELEPHONE (OUTPATIENT)
Dept: DIABETES | Facility: CLINIC | Age: 58
End: 2023-10-13
Payer: COMMERCIAL

## 2023-10-13 VITALS — HEIGHT: 72 IN | WEIGHT: 244 LBS | BODY MASS INDEX: 33.05 KG/M2

## 2023-10-13 DIAGNOSIS — E11.65 TYPE 2 DIABETES MELLITUS WITH HYPERGLYCEMIA, WITH LONG-TERM CURRENT USE OF INSULIN: Primary | Chronic | ICD-10-CM

## 2023-10-13 DIAGNOSIS — Z79.4 TYPE 2 DIABETES MELLITUS WITH HYPERGLYCEMIA, WITH LONG-TERM CURRENT USE OF INSULIN: Primary | Chronic | ICD-10-CM

## 2023-10-13 RX ORDER — BLOOD-GLUCOSE SENSOR
EACH MISCELLANEOUS
Qty: 3 EACH | Refills: 11 | Status: SHIPPED | OUTPATIENT
Start: 2023-10-13 | End: 2023-11-14 | Stop reason: SDUPTHER

## 2023-10-13 RX ORDER — BLOOD-GLUCOSE TRANSMITTER
EACH MISCELLANEOUS
Qty: 1 EACH | Refills: 4 | Status: SHIPPED | OUTPATIENT
Start: 2023-10-13 | End: 2023-11-14 | Stop reason: SDUPTHER

## 2023-10-13 NOTE — TELEPHONE ENCOUNTER
He needs to change to the dexcom G6   Please let him know that I sent it in to [facility].   Schedule him with norma again to pair Omnipod 5 with dexcom G6

## 2023-10-13 NOTE — PROGRESS NOTES
Diabetes Care Specialist Progress Note  Author: Kathy Watkins RD, CDE  Date: 10/13/2023    Program Intake  Reason for Diabetes Program Visit:: Initial Diabetes Assessment  Type of Intervention:: Individual  Individual: Device Training  Device Training: Insulin Pump Start  Current diabetes risk level:: moderate (HgbA1c 8.0)  In the last 12 months, have you:: used emergency room services  Was the ER or hospital admission related to diabetes?: No  Permission to speak with others about care:: no    Lab Results   Component Value Date    HGBA1C 8.2 (H) 08/01/2023               Clinical    Weight: 110.7 kg (244 lb)   Height: 6' (182.9 cm)   Body mass index is 33.09 kg/m².    Patient Health Rating  Compared to other people your age, how would you rate your health?: Good    Problem Review  Reviewed Problem List with Patient: yes  Active comorbidities affecting diabetes self-care.: no  Reviewed health maintenance: yes    Clinical Assessment  Current Diabetes Treatment: Oral Medication, Injectable, Insulin pump  Have you ever experienced hypoglycemia (low blood sugar)?: yes  In the last month, how often have you experienced low blood sugar?: other (see comments) (rarely, oncwe went to 66)  Are you able to tell when your blood sugar is low?: Yes  What symptoms do you experience?: Shaky, Anxious/nervous  Have you ever been hospitalized because your blood sugar was too low?: no  How do you treat hypoglycemia (low blood sugar)?: 1/2 can soda/fruit juice  Have you ever experienced hyperglycemia (high blood sugar)?: no    Medication Information  How do you obtain your medications?: Patient drives, Family picks up  How many days a week do you miss your medications?: 1  Do you use a pill box or medication chart to help you manage your medications?: No  Do you sometimes have difficulty refilling your medications?: Yes (see comment)  Medication adherence impacting ability to self-manage diabetes?: No    Labs  Do you have regular lab  work to monitor your medications?: Yes  Type of Regular Lab Work: A1c, Cholesterol, Microalbumin, CBC, BMP  Where do you get your labs drawn?: [facility]  Lab Compliance Barriers: No    Nutritional Status  Diet: Regular  Meal Plan 24 Hour Recall: Breakfast, Lunch, Dinner, Snack  Meal Plan 24 Hour Recall - Breakfast: Eggs, toast, bagel, something like that  Meal Plan 24 Hour Recall - Lunch: sandwich  Meal Plan 24 Hour Recall - Dinner: rice or pasta, bread, meat  Meal Plan 24 Hour Recall - Snack: mixed nuts, cookies, Drinks:1 diet coke per day, No regular cokes, Diet green tea or water, SF vitamin water  Change in appetite?: No  Dentation:: Intact  Recent Changes in Weight: No Recent Weight Change  Current nutritional status an area of need that is impacting patient's ability to self-manage diabetes?: No    Additional Social History    Support  Does anyone support you with your diabetes care?: yes  Who supports you?: son/daughter, spouse, self  Who takes you to your medical appointments?: self  Does the current support meet the patient's needs?: Yes  Is Support an area impacting ability to self-manage diabetes?: No    Access to Mass Media & Technology  Does the patient have access to any of the following devices or technologies?: Smart phone, Internet Access  Media or technology needs impacting ability to self-manage diabetes?: No    Cognitive/Behavioral Health  Alert and Oriented: Yes  Difficulty Thinking: No  Requires Prompting: No  Requires assistance for routine expression?: No  Cognitive or behavioral barriers impacting ability to self-manage diabetes?: No    Culture/Catholic  Culture or Anabaptist beliefs that may impact ability to access healthcare: No    Communication  Language preference: English  Hearing Problems: Yes  Hearing Assistance: Hearing aid  Vision Problems: Yes  Vision problem type:: Decreased Vision  Communication needs impacting ability to self-manage diabetes?: No    Health Literacy  Preferred  Learning Method: Face to Face, Demonstration, Hands On  How often do you need to have someone help you read instructions, pamphlets, or written material from your doctor or pharmacy?: Never  Health literacy needs impacting ability to self-manage diabetes?: No      Diabetes Self-Management Skills Assessment    Diabetes Disease Process/Treatment Options  Patient/caregiver able to state what happens when someone has diabetes.: yes  Patient/caregiver knows what type of diabetes they have.: yes  Diabetes Type : Type II  Diabetes Disease Process/Treatment Options: Skills Assessment Completed: Yes  Assessment indicates:: Adequate understanding  Area of need?: No    Nutrition/Healthy Eating  Challenges to healthy eating:: portion control  Method of carbohydrate measurement:: plate method  Patient can identify foods that impact blood sugar.: yes  Patient-identified foods:: fruit/fruit juice, starches (bread, pasta, rice, cereal), milk, soda, starchy vegetables (corn, peas, beans), sweets, yogurt  Nutrition/Healthy Eating Skills Assessment Completed:: Yes  Assessment indicates:: Other (comment)  Area of need?: Yes    Physical Activity/Exercise  Patient's daily activity level:: moderately active  Patient formally exercises outside of work.: no  Reasons for not exercising:: time constraints, work schedule  Patient can identify forms of physical activity.: yes  Stated forms of physical activity:: manual activity at work  Patient can identify reasons why exercise/physical activity is important in diabetes management.: yes  Identified reasons:: lowers blood glucose, blood pressure, and cholesterol  Physical Activity/Exercise Skills Assessment Completed: : Yes  Assessment indicates:: Adequate understanding, Other (comment) (activity mode in omnipod 5)  Area of need?: Yes    Medications  Patient is able to describe current diabetes management routine.: yes  Diabetes management routine:: injectable medications, insulin, oral  medications  Patient is able to identify current diabetes medications, dosages, and appropriate timing of medications.: yes  Patient understands the purpose of the medications taken for diabetes.: yes  Patient reports problems or concerns with current medication regimen.: yes  Medication regimen problems/concerns:: financial concerns, lack of training  Medication Skills Assessment Completed:: Yes  Assessment indicates:: Knowledge deficit, Instruction Needed  Area of need?: Yes    Home Blood Glucose Monitoring  Patient states that blood sugar is checked at home daily.: yes  Monitoring Method:: personal continuous glucose monitor  Personal CGM type:: Dexcom G7  Patient is able to use personal CGM appropriately.: yes  CGM Report reviewed?: yes  Home Blood Glucose Monitoring Skills Assessment Completed: : Yes  Assessment indicates:: Adequate understanding  Area of need?: No    Acute Complications  Acute Complications Skills Assessment Completed: : No  Deffered due to:: Time    Chronic Complications  Chronic Complications Skills Assessment Completed: : No  Deferred due to:: Time    Psychosocial/Coping  Psychosocial/Coping Skills Assessment Completed: : No  Deffered due to:: Time      Assessment Summary and Plan    Based on today's diabetes care assessment, the following areas of need were identified:          10/10/2023    12:01 AM   Social   Support No   Access to Mass Media/Tech No   Cognitive/Behavioral Health No   Culture/Voodoo No   Communication No   Health Literacy No            10/10/2023    12:01 AM   Clinical   Medication Adherence No   Lab Compliance No   Nutritional Status No            10/10/2023    12:01 AM   Diabetes Self-Management Skills   Diabetes Disease Process/Treatment Options No   Nutrition/Healthy Eating Yes, discussed adding carbohydrates to the omnipod 5, reviewed carbohydrates content of foods   Physical Activity/Exercise Yes, discussed activity mode in omnipod 5   Medication Yes, educated on  and started the omnipod 5 in manual mode at time of visit  Insulin Pump Education  OMNI POD 5 INSULIN PUMP START  Explained SmartAdjust Technology - Every 5 minutes, the system automatically increases, decreases, or pauses insulin delivery based on your customized target glucose--helping to protect against highs and lows, day and night.  Automated Mode vs Manual Mode vs Limited Automated Mode  Downloading the Esequiel and ProConnect ([facility])  Pod and Dexcom need to be in line of site of each other  Inputting Dexcom Transmitter Code into Esequiel or Handheld Device  Dexcom communicates with the pod directly and the Dexcom G6 esequiel  Activity Feature  Changing the target and the length of time insulin is on board will adjust automated mode  Changing ICR/Basal Rates/ISF will only change setting in manual mode  SmartBolus Calculator - incorporates CGM data and trend data  Setup podder central account and linked [employer] account  Patient educated on insulin pump therapy, what a basal rate and bolus dose are, when to change pod, demonstrated how to prepare the syringe and pod for use with the pump, discussed ease of usage, basal and bolus, carbohydrate to insulin ratio, correction factors, approved areas to insert set, carbohydrate counting, error messages, explained the basal rates - patient will receive a little bit of insulin throughout 24 hours, bolus dose are delivered delivered by the inputting grams of carbohydrates, explained that patient will be counting carbohydrates and checking blood glucose before each meal, discussed when to change the pod, demonstrated how to fill the pod with insulin, how to apply pod and insert the needle, explained and demonstrated how to safely retract the needle and remove the pod, discussed ease of usage, carbohydrate counting, demonstrated applying device, inserting needle, administering bolus insulin, retracting needle, and removing/disposing of pod. Patient states understanding and  performed return demonstration. Patient started first pod in office. Patient provided with written literature and CDE contact information      Pump training was provided per Omni Pod protocol.  Patient is new to insulin pump therapy.      All settings obtained from Lizzy Devi's last office visit note.   Basal:  12AM: 1.65 units/hr     Max basal rate: 5 u/hr     Bolus:  CHO ratio: 1:10 (use set dose of 100-140 grams to provide 10-14 units)  ISF: 1:35  Glucose target : 120 mg/dL  Correct  over: 120mg/dl  Active insulin time: 3.5 hours  Max bolus: 30 units     Low reservoir insulin alarm: 10 units  Change pod alarm: every 3 days      Details of pump therapy were covered included following controller features and programming, pod activation, pod site selection and rotation, automatic pod priming and insertion, setting & editing basal rates in manual mode, giving bolus and other features in the set-up menu.  The following regarding the Omnipod 5 was covered:  During your first Pod wear, since no recent history is available, the Omnipod 5 System uses only your active Basal Program from Manual Mode as a starting point to adjust your insulin. After 48 hours of history is collected, which usually happens with the first Pod wear, SmartOptTown technology stops adjusting insulin against your active Basal Program and starts using the Adaptive Basal Rate for your automated insulin delivery with your next Pod change. With each Pod change, insulin delivery information is sent and saved in the Omnipod 5 Esequiel so that the next Pod that is started is updated with the new Adaptive Basal Rate. With each new Pod activation, the system adapts insulin delivery based on physiological needs and total daily insulin (TDI) delivered. After 2-3 Pod changes, adaptation generally stabilizes and automated insulin delivery is based on this adaptation.  Patient demonstrated ability to program controller, activate and insert pod using aseptic  technique.  Patient demonstrated ability to program Dexcom transmitter into controller and start automated limited mode.    Instructed pt on use of basic pump features ie...give a bolus, pause insulin, switch from manual to automated mode.  Reviewed features available in manual mode verses automated mode.   Reviewed when and how to use activity function in automated mode.  Reviewed site selection of pods, rotation of sites and hard stop to change pod every 72 hrs.   Instructed that insulin vial is good out of refrigeration for up to 28-30 days.   Reviewed treatment of hypoglycemia, hyperglycemia; sick day care, DKA, and troubleshooting of pump.  Advised to carry back-up supplies with her and discussed steps to take in case of connection loss.   Omni Pod 24-hour support line provided.       Podder username: [email or web address]  Podder password: [email or web address]     Glooko username: [email or web address]  Glooko password: [email or web address]   Home Blood Glucose Monitoring No          Today's interventions were provided through individual discussion, instruction, and written materials were provided.      Patient verbalized understanding of instruction and written materials.  Pt was able to return back demonstration of instructions today. Patient understood key points, needs reinforcement and further instruction.     Diabetes Self-Management Care Plan:    Today's Diabetes Self-Management Care Plan was developed with Cedrick's input. Cedrick has agreed to work toward the following goal(s) to improve his/her overall diabetes control.      Care Plan: Diabetes Management   Updates made since 9/13/2023 12:00 AM        Problem: Healthy Eating         Goal: Eat 3 meals daily with 45-60g/3-4 servings of Carbohydrate per meal.    Start Date: 6/21/2022   Expected End Date: 12/31/2023   This Visit's Progress: On track   Recent Progress: Not met   Priority: High   Barriers: Lack of Motivation to Change   Note:    Reviewed carb counting, portion control,  importance of spacing meals throughout the day to prevent post prandial elevations.  Recommended low saturated fat, low sodium diet to aid in control of hypertension and cholesterol. Reviewed plate method and portion control, dining out tips, meal planning, reading a food label, healthy snack options, benefits of physical activity         Problem: Blood Glucose Self-Monitoring Resolved 10/13/2023        Goal: Patient agrees to check and record blood sugars 4 times per day with the libre2. Completed 10/13/2023   Start Date: 6/21/2022   Expected End Date: 9/23/2022   Recent Progress: Not met   Priority: Medium   Barriers: Lack of Supplies   Note:    Freestyle Maria De Jesus Education  Patient is here in clinic today for initial start of Freestyle Maria De Jesus continuous glucose monitoring system (CGMA). Reviewed with patient how to insert sensor. Patient inserted sensor on left arm tricep region. Hypoglycemia trigger set for 70 and hyperglycemia set for 180. Patient provided with phone number for 24-hour help line if needed. Down loaded Maria De Jesus view lena. Patient will use his phone to scan sensor and get readings. Patient accepted the invitation to share data with our clinic and will follow-up in 2 weeks. Questions addressed. Initialization finished. No futher questions.           Follow Up Plan     Follow up in about 1 week (around 10/17/2023) for Insulin Pump Upload.    Today's care plan and follow up schedule was discussed with patient.  Cedrick verbalized understanding of the care plan, goals, and agrees to follow up plan.        The patient was encouraged to communicate with his/her health care provider/physician and care team regarding his/her condition(s) and treatment.  I provided the patient with my contact information today and encouraged to contact me via phone or [facility] Patient Portal as needed.     Length of Visit   Total Time: 75 Minutes

## 2023-10-17 DIAGNOSIS — E11.65 TYPE 2 DIABETES MELLITUS WITH HYPERGLYCEMIA, WITH LONG-TERM CURRENT USE OF INSULIN: Chronic | ICD-10-CM

## 2023-10-17 DIAGNOSIS — Z79.4 TYPE 2 DIABETES MELLITUS WITH HYPERGLYCEMIA, WITH LONG-TERM CURRENT USE OF INSULIN: Chronic | ICD-10-CM

## 2023-10-18 ENCOUNTER — PATIENT MESSAGE (OUTPATIENT)
Dept: CARDIOLOGY | Facility: CLINIC | Age: 58
End: 2023-10-18
Payer: COMMERCIAL

## 2023-10-18 RX ORDER — DAPAGLIFLOZIN 10 MG/1
10 TABLET, FILM COATED ORAL DAILY
Qty: 90 TABLET | Refills: 1 | Status: SHIPPED | OUTPATIENT
Start: 2023-10-18 | End: 2023-11-14 | Stop reason: SDUPTHER

## 2023-10-18 RX ORDER — TIRZEPATIDE 7.5 MG/.5ML
7.5 INJECTION, SOLUTION SUBCUTANEOUS
Qty: 4 PEN | Refills: 4 | Status: SHIPPED | OUTPATIENT
Start: 2023-10-18 | End: 2023-11-14 | Stop reason: DRUGHIGH

## 2023-11-12 ENCOUNTER — PATIENT MESSAGE (OUTPATIENT)
Dept: DIABETES | Facility: CLINIC | Age: 58
End: 2023-11-12
Payer: COMMERCIAL

## 2023-11-14 ENCOUNTER — OFFICE VISIT (OUTPATIENT)
Dept: DIABETES | Facility: CLINIC | Age: 58
End: 2023-11-14
Payer: COMMERCIAL

## 2023-11-14 VITALS
BODY MASS INDEX: 34.27 KG/M2 | DIASTOLIC BLOOD PRESSURE: 79 MMHG | HEIGHT: 72 IN | SYSTOLIC BLOOD PRESSURE: 124 MMHG | OXYGEN SATURATION: 97 % | WEIGHT: 253 LBS | HEART RATE: 82 BPM

## 2023-11-14 DIAGNOSIS — E66.09 CLASS 1 OBESITY DUE TO EXCESS CALORIES WITH SERIOUS COMORBIDITY AND BODY MASS INDEX (BMI) OF 34.0 TO 34.9 IN ADULT: ICD-10-CM

## 2023-11-14 DIAGNOSIS — E11.69 DM TYPE 2 WITH DIABETIC MIXED HYPERLIPIDEMIA: ICD-10-CM

## 2023-11-14 DIAGNOSIS — Z46.81 INSULIN PUMP FITTING OR ADJUSTMENT: ICD-10-CM

## 2023-11-14 DIAGNOSIS — Z71.9 HEALTH EDUCATION/COUNSELING: ICD-10-CM

## 2023-11-14 DIAGNOSIS — I10 ESSENTIAL HYPERTENSION: ICD-10-CM

## 2023-11-14 DIAGNOSIS — Z96.41 INSULIN PUMP IN PLACE: ICD-10-CM

## 2023-11-14 DIAGNOSIS — E78.2 MIXED HYPERLIPIDEMIA: ICD-10-CM

## 2023-11-14 DIAGNOSIS — E78.2 DM TYPE 2 WITH DIABETIC MIXED HYPERLIPIDEMIA: ICD-10-CM

## 2023-11-14 DIAGNOSIS — E11.65 TYPE 2 DIABETES MELLITUS WITH HYPERGLYCEMIA, WITH LONG-TERM CURRENT USE OF INSULIN: Primary | Chronic | ICD-10-CM

## 2023-11-14 DIAGNOSIS — Z79.4 TYPE 2 DIABETES MELLITUS WITH HYPERGLYCEMIA, WITH LONG-TERM CURRENT USE OF INSULIN: Primary | Chronic | ICD-10-CM

## 2023-11-14 PROCEDURE — 3078F PR MOST RECENT DIASTOLIC BLOOD PRESSURE < 80 MM HG: ICD-10-PCS | Mod: CPTII,S$GLB,, | Performed by: NURSE PRACTITIONER

## 2023-11-14 PROCEDURE — 3074F PR MOST RECENT SYSTOLIC BLOOD PRESSURE < 130 MM HG: ICD-10-PCS | Mod: CPTII,S$GLB,, | Performed by: NURSE PRACTITIONER

## 2023-11-14 PROCEDURE — 1159F MED LIST DOCD IN RCRD: CPT | Mod: CPTII,S$GLB,, | Performed by: NURSE PRACTITIONER

## 2023-11-14 PROCEDURE — 4010F ACE/ARB THERAPY RXD/TAKEN: CPT | Mod: CPTII,S$GLB,, | Performed by: NURSE PRACTITIONER

## 2023-11-14 PROCEDURE — 3008F PR BODY MASS INDEX (BMI) DOCUMENTED: ICD-10-PCS | Mod: CPTII,S$GLB,, | Performed by: NURSE PRACTITIONER

## 2023-11-14 PROCEDURE — 3066F NEPHROPATHY DOC TX: CPT | Mod: CPTII,S$GLB,, | Performed by: NURSE PRACTITIONER

## 2023-11-14 PROCEDURE — 99215 OFFICE O/P EST HI 40 MIN: CPT | Mod: S$GLB,,, | Performed by: NURSE PRACTITIONER

## 2023-11-14 PROCEDURE — 3052F HG A1C>EQUAL 8.0%<EQUAL 9.0%: CPT | Mod: CPTII,S$GLB,, | Performed by: NURSE PRACTITIONER

## 2023-11-14 PROCEDURE — 99999 PR PBB SHADOW E&M-EST. PATIENT-LVL V: ICD-10-PCS | Mod: PBBFAC,,, | Performed by: NURSE PRACTITIONER

## 2023-11-14 PROCEDURE — 3061F PR NEG MICROALBUMINURIA RESULT DOCUMENTED/REVIEW: ICD-10-PCS | Mod: CPTII,S$GLB,, | Performed by: NURSE PRACTITIONER

## 2023-11-14 PROCEDURE — 3008F BODY MASS INDEX DOCD: CPT | Mod: CPTII,S$GLB,, | Performed by: NURSE PRACTITIONER

## 2023-11-14 PROCEDURE — 95251 PR GLUCOSE MONITOR, 72 HOUR, PHYS INTERP: ICD-10-PCS | Mod: S$GLB,,, | Performed by: NURSE PRACTITIONER

## 2023-11-14 PROCEDURE — 3066F PR DOCUMENTATION OF TREATMENT FOR NEPHROPATHY: ICD-10-PCS | Mod: CPTII,S$GLB,, | Performed by: NURSE PRACTITIONER

## 2023-11-14 PROCEDURE — 95251 CONT GLUC MNTR ANALYSIS I&R: CPT | Mod: S$GLB,,, | Performed by: NURSE PRACTITIONER

## 2023-11-14 PROCEDURE — 1160F RVW MEDS BY RX/DR IN RCRD: CPT | Mod: CPTII,S$GLB,, | Performed by: NURSE PRACTITIONER

## 2023-11-14 PROCEDURE — 3074F SYST BP LT 130 MM HG: CPT | Mod: CPTII,S$GLB,, | Performed by: NURSE PRACTITIONER

## 2023-11-14 PROCEDURE — 1160F PR REVIEW ALL MEDS BY PRESCRIBER/CLIN PHARMACIST DOCUMENTED: ICD-10-PCS | Mod: CPTII,S$GLB,, | Performed by: NURSE PRACTITIONER

## 2023-11-14 PROCEDURE — 99999 PR PBB SHADOW E&M-EST. PATIENT-LVL V: CPT | Mod: PBBFAC,,, | Performed by: NURSE PRACTITIONER

## 2023-11-14 PROCEDURE — 3061F NEG MICROALBUMINURIA REV: CPT | Mod: CPTII,S$GLB,, | Performed by: NURSE PRACTITIONER

## 2023-11-14 PROCEDURE — 4010F PR ACE/ARB THEARPY RXD/TAKEN: ICD-10-PCS | Mod: CPTII,S$GLB,, | Performed by: NURSE PRACTITIONER

## 2023-11-14 PROCEDURE — 99215 PR OFFICE/OUTPT VISIT, EST, LEVL V, 40-54 MIN: ICD-10-PCS | Mod: S$GLB,,, | Performed by: NURSE PRACTITIONER

## 2023-11-14 PROCEDURE — 3078F DIAST BP <80 MM HG: CPT | Mod: CPTII,S$GLB,, | Performed by: NURSE PRACTITIONER

## 2023-11-14 PROCEDURE — 3052F PR MOST RECENT HEMOGLOBIN A1C LEVEL 8.0 - < 9.0%: ICD-10-PCS | Mod: CPTII,S$GLB,, | Performed by: NURSE PRACTITIONER

## 2023-11-14 PROCEDURE — 1159F PR MEDICATION LIST DOCUMENTED IN MEDICAL RECORD: ICD-10-PCS | Mod: CPTII,S$GLB,, | Performed by: NURSE PRACTITIONER

## 2023-11-14 RX ORDER — BLOOD-GLUCOSE SENSOR
EACH MISCELLANEOUS
Qty: 3 EACH | Refills: 11 | Status: SHIPPED | OUTPATIENT
Start: 2023-11-14 | End: 2024-01-16 | Stop reason: SDUPTHER

## 2023-11-14 RX ORDER — INSULIN ASPART INJECTION 100 [IU]/ML
INJECTION, SOLUTION SUBCUTANEOUS
Qty: 30 ML | Refills: 6 | Status: SHIPPED | OUTPATIENT
Start: 2023-11-14 | End: 2024-01-24

## 2023-11-14 RX ORDER — DAPAGLIFLOZIN 10 MG/1
10 TABLET, FILM COATED ORAL DAILY
Qty: 90 TABLET | Refills: 1 | Status: SHIPPED | OUTPATIENT
Start: 2023-11-14 | End: 2024-01-23 | Stop reason: SDUPTHER

## 2023-11-14 RX ORDER — METFORMIN HYDROCHLORIDE 1000 MG/1
1000 TABLET ORAL 2 TIMES DAILY WITH MEALS
Qty: 180 TABLET | Refills: 1 | Status: SHIPPED | OUTPATIENT
Start: 2023-11-14

## 2023-11-14 RX ORDER — BLOOD-GLUCOSE TRANSMITTER
EACH MISCELLANEOUS
Qty: 1 EACH | Refills: 4 | Status: SHIPPED | OUTPATIENT
Start: 2023-11-14 | End: 2024-01-16 | Stop reason: SDUPTHER

## 2023-11-14 RX ORDER — INSULIN PMP CART,AUT,G6/7,CNTR
1 EACH SUBCUTANEOUS
Qty: 3 EACH | Refills: 11 | Status: SHIPPED | OUTPATIENT
Start: 2023-11-14 | End: 2023-11-27 | Stop reason: SDUPTHER

## 2023-11-14 RX ORDER — TIRZEPATIDE 10 MG/.5ML
10 INJECTION, SOLUTION SUBCUTANEOUS
Qty: 4 PEN | Refills: 0 | Status: SHIPPED | OUTPATIENT
Start: 2023-11-14 | End: 2023-12-18

## 2023-11-14 NOTE — ASSESSMENT & PLAN NOTE
Body mass index is 34.31 kg/m².  Increases insulin resistance.   Discussed DM diet and exercise.

## 2023-11-14 NOTE — ASSESSMENT & PLAN NOTE
Due for A1c check- plan to go get labs this week   Suspect A1c may be above goal -- based on dexcom download   + prandial excursions r/t dietary indiscretions and/ or missed doses of bolus doses   Cost is an issue with diabetes management.     Long discussion with patient about proper Omnipod 5 use and pump optimization  Asked that he use the carbohydrate calculator to calculate his bolus doses and entering his blood sugar readings every time he is eating.  We did discuss that he is not actually going to carbohydrate count but that he is going to enter in doses-- so 100 grams of CHO for 10 units and etc       He would like to maximize the Mounjaro       Medication changes:     Increase Mounjaro to 10 mg weekly.  Can continue to increase as needed and as tolerated      Continue Metformin 1000 mg 2 times per day      Continue Farxiga 10 mg daily,     Insulin Pump:Omnipod 5 with Fiasp    Pump settings:  Basal:   [place]- 8AM: 1.65 units/hr -- continue   8AM- 12AM ([place])- 1.95 units /hr -- increased   ICR:1:10-- set doses with meals -- 12-16 units --120 gram or 160 grams   ISF: 1:35  Target: 120  IOB: 3.5      Long discussion about pump optimization   It will be better when he is on the dexocm G6 and his in auto mode   He plans to change back to G6 once G7 supplies run out   Reviewed proper bolus     -- Reviewed goals of therapy are to get the best control we can without hypoglycemia  -- Reviewed patient's current insulin regimen. Clarified proper insulin dose and timing in relation to meals, etc. Insulin injection sites and proper rotation instructed.    -- Advised frequent self blood glucose monitoring.  Patient encouraged to document glucose results and bring them to every clinic visit -   Change back to Dexcom G6 -- supplies sent to pharmacy   -- Hypoglycemia precautions discussed. Instructed on precautions before driving.    -- Call for Bg repeatedly < 70 or > 180.   -- Close adherence to lifestyle changes recommended.    -- Periodic follow ups for eye evaluations, foot care and dental care suggested.  -- diabetes education-- dexcom G6 and Omnipod 5 integration     Patient has diabetes mellitus and manages diabetes with intensive insulin regimen and uses prandial and basal insulin daily-- via omnipod 5   Patient requires a therapeutic CGM and is willing to use therapeutic CGM for the necessary frequent adjustments of insulin therapy.  I have completed an in-person visit during the previous 6 months and will continue to have in-person visits every 6 months to assess adherence to their CGM regimen and diabetes treatment plan.  Due to COVID pandemic and need for remote monitoring this tool is medically necessary
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
## 2023-11-15 ENCOUNTER — TELEPHONE (OUTPATIENT)
Dept: DIABETES | Facility: CLINIC | Age: 58
End: 2023-11-15
Payer: COMMERCIAL

## 2023-11-15 NOTE — TELEPHONE ENCOUNTER
Diabetes Education staff stated pt stated he won't receive his G6 until the beginning of the new year.

## 2023-11-16 ENCOUNTER — PATIENT MESSAGE (OUTPATIENT)
Dept: DIABETES | Facility: CLINIC | Age: 58
End: 2023-11-16
Payer: COMMERCIAL

## 2023-11-27 DIAGNOSIS — E11.65 TYPE 2 DIABETES MELLITUS WITH HYPERGLYCEMIA, WITH LONG-TERM CURRENT USE OF INSULIN: Chronic | ICD-10-CM

## 2023-11-27 DIAGNOSIS — Z79.4 TYPE 2 DIABETES MELLITUS WITH HYPERGLYCEMIA, WITH LONG-TERM CURRENT USE OF INSULIN: Chronic | ICD-10-CM

## 2023-11-27 RX ORDER — INSULIN PMP CART,AUT,G6/7,CNTR
1 EACH SUBCUTANEOUS
Qty: 3 EACH | Refills: 11 | Status: SHIPPED | OUTPATIENT
Start: 2023-11-27 | End: 2024-01-26 | Stop reason: SDUPTHER

## 2023-12-12 ENCOUNTER — TELEPHONE (OUTPATIENT)
Dept: DIABETES | Facility: CLINIC | Age: 58
End: 2023-12-12
Payer: COMMERCIAL

## 2023-12-14 RX ORDER — LISINOPRIL AND HYDROCHLOROTHIAZIDE 12.5; 2 MG/1; MG/1
1 TABLET ORAL DAILY
Qty: 90 TABLET | Refills: 0 | OUTPATIENT
Start: 2023-12-14

## 2023-12-14 NOTE — TELEPHONE ENCOUNTER
No care due was identified.  Health [facility] Embedded Care Due Messages. Reference number: [id number].   12/14/2023 11:08:21 AM CST

## 2023-12-14 NOTE — TELEPHONE ENCOUNTER
Refill Decision Note   Cedrick Saunders  is requesting a refill authorization.  Brief Assessment and Rationale for Refill:  Quick Discontinue     Medication Therapy Plan:  Duplicate request      Comments:     Note composed:11:43 AM 12/14/2023

## 2023-12-18 DIAGNOSIS — Z79.4 TYPE 2 DIABETES MELLITUS WITH HYPERGLYCEMIA, WITH LONG-TERM CURRENT USE OF INSULIN: Primary | Chronic | ICD-10-CM

## 2023-12-18 DIAGNOSIS — E11.65 TYPE 2 DIABETES MELLITUS WITH HYPERGLYCEMIA, WITH LONG-TERM CURRENT USE OF INSULIN: Primary | Chronic | ICD-10-CM

## 2023-12-18 RX ORDER — TIRZEPATIDE 12.5 MG/.5ML
12.5 INJECTION, SOLUTION SUBCUTANEOUS
Qty: 4 PEN | Refills: 4 | Status: SHIPPED | OUTPATIENT
Start: 2023-12-18 | End: 2024-01-16

## 2024-01-16 ENCOUNTER — TELEPHONE (OUTPATIENT)
Dept: DIABETES | Facility: CLINIC | Age: 59
End: 2024-01-16
Payer: COMMERCIAL

## 2024-01-16 DIAGNOSIS — Z79.4 TYPE 2 DIABETES MELLITUS WITH HYPERGLYCEMIA, WITH LONG-TERM CURRENT USE OF INSULIN: Chronic | ICD-10-CM

## 2024-01-16 DIAGNOSIS — E11.65 TYPE 2 DIABETES MELLITUS WITH HYPERGLYCEMIA, WITH LONG-TERM CURRENT USE OF INSULIN: Chronic | ICD-10-CM

## 2024-01-16 RX ORDER — BLOOD-GLUCOSE TRANSMITTER
EACH MISCELLANEOUS
Qty: 1 EACH | Refills: 4 | Status: SHIPPED | OUTPATIENT
Start: 2024-01-16

## 2024-01-16 RX ORDER — TIRZEPATIDE 15 MG/.5ML
15 INJECTION, SOLUTION SUBCUTANEOUS
Qty: 4 PEN | Refills: 6 | Status: SHIPPED | OUTPATIENT
Start: 2024-01-16 | End: 2024-01-23 | Stop reason: SDUPTHER

## 2024-01-16 RX ORDER — BLOOD-GLUCOSE SENSOR
EACH MISCELLANEOUS
Qty: 3 EACH | Refills: 11 | Status: SHIPPED | OUTPATIENT
Start: 2024-01-16

## 2024-01-18 ENCOUNTER — TELEPHONE (OUTPATIENT)
Dept: DIABETES | Facility: CLINIC | Age: 59
End: 2024-01-18
Payer: COMMERCIAL

## 2024-01-18 NOTE — TELEPHONE ENCOUNTER
----- Message from Ivana Cunha sent at 1/18/2024 10:17 AM CST -----  Contact: Tuan/DIMA [phone]  Pharmacy is calling to clarify an RX.  RX name:  insulin pump cart,automated,BT (OMNIPOD 5 G6 PODS, GEN 5,) Crtg  What do they need to clarify:  PA needed for Qty Limit, Pt needs 15 instead of 10  Comments:     Please call and advise.    Thank You

## 2024-01-22 ENCOUNTER — TELEPHONE (OUTPATIENT)
Dept: DIABETES | Facility: CLINIC | Age: 59
End: 2024-01-22
Payer: COMMERCIAL

## 2024-01-23 ENCOUNTER — PATIENT MESSAGE (OUTPATIENT)
Dept: DIABETES | Facility: CLINIC | Age: 59
End: 2024-01-23
Payer: COMMERCIAL

## 2024-01-23 ENCOUNTER — TELEPHONE (OUTPATIENT)
Dept: DIABETES | Facility: CLINIC | Age: 59
End: 2024-01-23
Payer: COMMERCIAL

## 2024-01-23 DIAGNOSIS — E11.65 TYPE 2 DIABETES MELLITUS WITH HYPERGLYCEMIA, WITH LONG-TERM CURRENT USE OF INSULIN: Primary | Chronic | ICD-10-CM

## 2024-01-23 DIAGNOSIS — E11.65 TYPE 2 DIABETES MELLITUS WITH HYPERGLYCEMIA, WITH LONG-TERM CURRENT USE OF INSULIN: Chronic | ICD-10-CM

## 2024-01-23 DIAGNOSIS — Z79.4 TYPE 2 DIABETES MELLITUS WITH HYPERGLYCEMIA, WITH LONG-TERM CURRENT USE OF INSULIN: Chronic | ICD-10-CM

## 2024-01-23 DIAGNOSIS — Z79.4 TYPE 2 DIABETES MELLITUS WITH HYPERGLYCEMIA, WITH LONG-TERM CURRENT USE OF INSULIN: Primary | Chronic | ICD-10-CM

## 2024-01-23 RX ORDER — DAPAGLIFLOZIN 10 MG/1
10 TABLET, FILM COATED ORAL DAILY
Qty: 90 TABLET | Refills: 2 | Status: SHIPPED | OUTPATIENT
Start: 2024-01-23 | End: 2024-01-23 | Stop reason: CLARIF

## 2024-01-23 RX ORDER — TIRZEPATIDE 15 MG/.5ML
15 INJECTION, SOLUTION SUBCUTANEOUS
Qty: 4 PEN | Refills: 6 | Status: SHIPPED | OUTPATIENT
Start: 2024-01-23 | End: 2024-01-24 | Stop reason: SDUPTHER

## 2024-01-23 NOTE — TELEPHONE ENCOUNTER
----- Message from Guerda Manzanares MA sent at 1/23/2024  1:39 PM CST -----  Regarding: Farxiga Denied  Farxiga Denied (denial scanned into media)  Alternative: Jardiance

## 2024-01-24 ENCOUNTER — TELEPHONE (OUTPATIENT)
Dept: DIABETES | Facility: CLINIC | Age: 59
End: 2024-01-24
Payer: COMMERCIAL

## 2024-01-24 DIAGNOSIS — Z79.4 TYPE 2 DIABETES MELLITUS WITH HYPERGLYCEMIA, WITH LONG-TERM CURRENT USE OF INSULIN: Chronic | ICD-10-CM

## 2024-01-24 DIAGNOSIS — E11.65 TYPE 2 DIABETES MELLITUS WITH HYPERGLYCEMIA, WITH LONG-TERM CURRENT USE OF INSULIN: Chronic | ICD-10-CM

## 2024-01-24 RX ORDER — TIRZEPATIDE 15 MG/.5ML
15 INJECTION, SOLUTION SUBCUTANEOUS
Qty: 4 PEN | Refills: 6 | Status: SHIPPED | OUTPATIENT
Start: 2024-01-24 | End: 2024-02-23 | Stop reason: SDUPTHER

## 2024-01-24 RX ORDER — INSULIN LISPRO 100 [IU]/ML
INJECTION, SOLUTION INTRAVENOUS; SUBCUTANEOUS
Qty: 30 ML | Refills: 6 | Status: SHIPPED | OUTPATIENT
Start: 2024-01-24

## 2024-01-24 RX ORDER — DAPAGLIFLOZIN 10 MG/1
10 TABLET, FILM COATED ORAL DAILY
Qty: 90 TABLET | Refills: 2 | Status: SHIPPED | OUTPATIENT
Start: 2024-01-24 | End: 2024-05-29

## 2024-01-24 NOTE — TELEPHONE ENCOUNTER
"Patient is now messaging me that Mounjaro need chart notes to insurance   Farxiga was denied and he cannot take Jardiance because it makes him "sick"   We will need to appeal the denial on the Jardiance ans ask to keep him on the Farxiga   Fiasp is not covered-- we have to change him to Humalog     So we need to see about Mounjaro   And see about Farxiga   "

## 2024-01-26 ENCOUNTER — TELEPHONE (OUTPATIENT)
Dept: DIABETES | Facility: CLINIC | Age: 59
End: 2024-01-26
Payer: COMMERCIAL

## 2024-01-26 DIAGNOSIS — Z79.4 TYPE 2 DIABETES MELLITUS WITH HYPERGLYCEMIA, WITH LONG-TERM CURRENT USE OF INSULIN: Chronic | ICD-10-CM

## 2024-01-26 DIAGNOSIS — E11.65 TYPE 2 DIABETES MELLITUS WITH HYPERGLYCEMIA, WITH LONG-TERM CURRENT USE OF INSULIN: Chronic | ICD-10-CM

## 2024-01-26 RX ORDER — INSULIN PMP CART,AUT,G6/7,CNTR
1 EACH SUBCUTANEOUS
Qty: 3 EACH | Refills: 11 | Status: SHIPPED | OUTPATIENT
Start: 2024-01-26

## 2024-01-26 NOTE — TELEPHONE ENCOUNTER
----- Message from Kennedi Christina LPN sent at 1/26/2024 11:38 AM CST -----  [facility] stated they received the appeals for mounjaro and farxiga ,and it is still  under review , stated for us to call back next week

## 2024-01-26 NOTE — TELEPHONE ENCOUNTER
He picked up the Jardiance and will start that due to hyperglycemia     7 days off farxiga and 14 days without mounjaro     Will take some old ozempic

## 2024-01-26 NOTE — TELEPHONE ENCOUNTER
[facility] stated they received the appeals for mounjaro and farxiga ,and it is still  under review , stated for us to call back next week

## 2024-02-01 ENCOUNTER — TELEPHONE (OUTPATIENT)
Dept: DIABETES | Facility: CLINIC | Age: 59
End: 2024-02-01
Payer: COMMERCIAL

## 2024-02-01 NOTE — TELEPHONE ENCOUNTER
Spoke to [facility] , stated that it can take up to 2/23 to receive a response for the appeals for farxiga and mounjaro

## 2024-02-01 NOTE — TELEPHONE ENCOUNTER
----- Message from Kennedi Christina LPN sent at 2/1/2024  1:38 PM CST -----  Unfortunately Spoke to [facility] , stated that it can take up to 2/23 to receive a response for the appeals for farxiga and mounjaro

## 2024-02-06 ENCOUNTER — TELEPHONE (OUTPATIENT)
Dept: DIABETES | Facility: CLINIC | Age: 59
End: 2024-02-06
Payer: COMMERCIAL

## 2024-02-06 NOTE — TELEPHONE ENCOUNTER
Call placed to [employer] to check on the appeal   I was also told that it could take 30 days and they cannot help me until the decision is made.   And the decision can take up to 30 days   I have asked to speak with management and awaiting a call back   All the information that is required has been submitted so it should be approved   He is T2DM   A1c is >6.5% and he is on metformin

## 2024-02-12 ENCOUNTER — TELEPHONE (OUTPATIENT)
Dept: DIABETES | Facility: CLINIC | Age: 59
End: 2024-02-12
Payer: COMMERCIAL

## 2024-02-12 ENCOUNTER — CLINICAL SUPPORT (OUTPATIENT)
Dept: DIABETES | Facility: CLINIC | Age: 59
End: 2024-02-12
Payer: COMMERCIAL

## 2024-02-12 DIAGNOSIS — Z79.4 TYPE 2 DIABETES MELLITUS WITH HYPERGLYCEMIA, WITH LONG-TERM CURRENT USE OF INSULIN: Chronic | ICD-10-CM

## 2024-02-12 DIAGNOSIS — E11.65 TYPE 2 DIABETES MELLITUS WITH HYPERGLYCEMIA, WITH LONG-TERM CURRENT USE OF INSULIN: Chronic | ICD-10-CM

## 2024-02-12 DIAGNOSIS — E11.69 DM TYPE 2 WITH DIABETIC MIXED HYPERLIPIDEMIA: Primary | ICD-10-CM

## 2024-02-12 DIAGNOSIS — E78.2 DM TYPE 2 WITH DIABETIC MIXED HYPERLIPIDEMIA: Primary | ICD-10-CM

## 2024-02-12 PROCEDURE — G0108 DIAB MANAGE TRN  PER INDIV: HCPCS | Mod: S$GLB,,, | Performed by: DIETITIAN, REGISTERED

## 2024-02-12 PROCEDURE — 99999 PR PBB SHADOW E&M-EST. PATIENT-LVL II: CPT | Mod: PBBFAC,,, | Performed by: DIETITIAN, REGISTERED

## 2024-02-12 NOTE — TELEPHONE ENCOUNTER
Spoke to [facility], again [facility] stated that the appeal was recuved on 1/24/2024 for mounjaro and farxiga and appeal is still under review possibly until 2/23/2024, stated that case ID # is [id number]

## 2024-02-19 NOTE — PROGRESS NOTES
Diabetes Care Specialist Follow-up Note  Author: Kathy Watkins RD, CDE  Date: 2/19/2024    Program Intake  Reason for Diabetes Program Visit:: Intervention  Type of Intervention:: Individual  Individual: Device Training  Device Training: Insulin Pump Start  Current diabetes risk level:: moderate (HgbA1c 8.0)  In the last 12 months, have you:: used emergency room services  Was the ER or hospital admission related to diabetes?: No  Permission to speak with others about care:: no  Continuous Glucose Monitoring  Patient has CGM: Yes  Personal CGM type:: Dexcom G7  GMI Date: 02/19/24  GMI Value: 7.3 %    Lab Results   Component Value Date    HGBA1C 7.3 (H) 11/15/2023     A1c Pre Diabetes Care Specialist Intervention:  8.2%    Clinical  Previous vitals:  Weight: 110.7 kg (244 lb)   Height: 6' (182.9 cm)   Body mass index is 33.09 kg/m².    Patient Health Rating  Compared to other people your age, how would you rate your health?: Good    Problem Review  Reviewed health maintenance: yes         Medication Information  Medication adherence impacting ability to self-manage diabetes?: No    Labs  Lab Compliance Barriers: No    Nutritional Status  Diet: Regular  Meal Plan 24 Hour Recall: Breakfast, Lunch, Dinner, Snack  Meal Plan 24 Hour Recall - Breakfast: Eggs, toast, bagel, something like that  Meal Plan 24 Hour Recall - Lunch: sandwich  Meal Plan 24 Hour Recall - Dinner: rice or pasta, bread, meat  Meal Plan 24 Hour Recall - Snack: mixed nuts, cookies, Drinks:1 diet coke per day, No regular cokes, Diet green tea or water, SF vitamin water  Current nutritional status an area of need that is impacting patient's ability to self-manage diabetes?: No    Physical activity/Exercise:   No change    SMBG: using the dexcom CGM  Average Glucose 168 mg/dl  Standard Deviation 37 mg/dL  GMI 7.3%  Time in Range  Very High 2%  High 30%  In Range 67%  Low 0%  Very Low <1%    Additional Social History    Support  Is Support an area  impacting ability to self-manage diabetes?: No    Access to Mass Media & Technology  Media or technology needs impacting ability to self-manage diabetes?: No    Cognitive/Behavioral Health  Cognitive or behavioral barriers impacting ability to self-manage diabetes?: No    Culture/Episcopalian  Culture or Oriental orthodox beliefs that may impact ability to access healthcare: No    Communication  Communication needs impacting ability to self-manage diabetes?: No    Health Literacy  Health literacy needs impacting ability to self-manage diabetes?: No      Diabetes Self-Management Skills Assessment     Diabetes Disease Process/Treatment Options  Diabetes Disease Process/Treatment Options: Skills Assessment Completed: No  Assessment indicates:: Adequate understanding  Deferred due to:: Other (comment) (previously completed, there has been no change and patient has adequate understanding)  Area of need?: No    Nutrition/Healthy Eating  Challenges to healthy eating:: portion control  Method of carbohydrate measurement:: plate method  Patient can identify foods that impact blood sugar.: yes  Patient-identified foods:: fruit/fruit juice, starches (bread, pasta, rice, cereal), milk, soda, starchy vegetables (corn, peas, beans), sweets, yogurt  Nutrition/Healthy Eating Skills Assessment Completed:: Yes  Assessment indicates:: Adequate understanding  Area of need?: No    Physical Activity/Exercise  Patient's daily activity level:: moderately active  Patient formally exercises outside of work.: no  Reasons for not exercising:: time constraints, work schedule  Patient can identify forms of physical activity.: yes  Stated forms of physical activity:: manual activity at work  Patient can identify reasons why exercise/physical activity is important in diabetes management.: yes  Identified reasons:: lowers blood glucose, blood pressure, and cholesterol  Physical Activity/Exercise Skills Assessment Completed: : Yes  Assessment indicates::  Adequate understanding  Area of need?: No    Medications  Patient is able to describe current diabetes management routine.: yes  Diabetes management routine:: injectable medications, insulin, oral medications, insulin pump  Patient is able to identify current diabetes medications, dosages, and appropriate timing of medications.: yes  Patient understands the purpose of the medications taken for diabetes.: yes  Patient reports problems or concerns with current medication regimen.: no  Medication Skills Assessment Completed:: Yes  Assessment indicates:: Adequate understanding  Area of need?: No    Home Blood Glucose Monitoring  Personal CGM type:: Dexcom G7  Home Blood Glucose Monitoring Skills Assessment Completed: : No  Assessment indicates:: Adequate understanding  Deferred due to:: Other (comment) (previously completed, there has been no change and patient has adequate understanding)  Area of need?: No    Acute Complications  Patient is able to identify types of acute complications: Yes  Patient Identified:: Hypoglycemia, Hyperglycemia  Patient is able to state the basic meaning of hypoglycemia?: Yes  Able to state the blood sugar range for hypoglycemia?: yes  Patient stated range:: <70  Patient can identify general symptoms of hypoglycemia: yes  Patient identified:: shakiness, dizziness  Able to state proper treatment of hypoglycemia?: yes  Patient identified:: 1/2 can soda/fruit juice  Patient is able to state the basic meaning of hyperglycemia?: Yes  Able to state the blood sugar range for hyperglycemia?: yes  Patient stated range:: >200  Patient able to state proper treatment of hyperglycemia?: yes  Patient identified:: take medication as recommended  Patient able to verbalize sick day plan?: yes  Patient-stated sick day plan:: drink more fluids  Acute Complications Skills Assessment Completed: : Yes  Assessment indicates:: Adequate understanding  Area of need?: No    Chronic Complications  Patient can identify  major chronic complications of diabetes.: yes  Stated chronic complications:: heart disease/heart attack, sexual dysfunction, stroke, kidney disease, neuropathy/nerve damage, retinopathy  Patient can identify ways to prevent or delay diabetes complications.: yes  Stated ways to prevent complications:: controlling blood sugar  Patient is aware that having diabetes increases risk of heart disease?: Yes  Patient is aware that heart disease is the leading cause of death and disability in people with diabetes?: Yes  Patient able to state risk factors for heart disease?: Yes  Patient stated risk factors for heart disease:: High blood pressure, High cholesterol  Patient is taking statin?: Yes  Chronic Complications Skills Assessment Completed: : Yes  Assessment indicates:: Adequate understanding, Other (comment)  Area of need?: Yes    Psychosocial/Coping  Patient can identify ways of coping with chronic disease.: yes  Patient-stated ways of coping with chronic disease:: spiritual/Zoroastrian practices, support from loved ones  Psychosocial/Coping Skills Assessment Completed: : Yes  Assessment indicates:: Adequate understanding  Area of need?: No      During today's follow-up visit,  the following areas required further assessment and content was provided/reviewed.    Based on today's diabetes care assessment, the following areas of need were identified:          2/12/2024    12:01 AM   Social   Support No   Access to Mass Media/Tech No   Cognitive/Behavioral Health No   Culture/Spiritism No   Communication No   Health Literacy No            2/12/2024    12:01 AM   Clinical   Medication Adherence No   Lab Compliance No   Nutritional Status No            2/12/2024    12:01 AM   Diabetes Self-Management Skills   Diabetes Disease Process/Treatment Options No   Nutrition/Healthy Eating No   Physical Activity/Exercise No   Medication Yes, need PA for Farxiga and Mounjaro needed, had adverse reaction to jardiance  Called [facility] for  appeals to check status [phone] - no updates, [facility] transferred me multiple time back and forth and we still have no answers    Insulin Pump Education  OMNI POD 5 INSULIN PUMP START  Explained SmartAdjust Technology - Every 5 minutes, the system automatically increases, decreases, or pauses insulin delivery based on your customized target glucose--helping to protect against highs and lows, day and night.  Automated Mode vs Manual Mode vs Limited Automated Mode  Downloading the Esequiel and ProConnect ([facility])  Pod and Dexcom need to be in line of site of each other  Inputting Dexcom Transmitter Code into Esequiel or Handheld Device  Dexcom communicates with the pod directly and the Dexcom G6 esequiel  Activity Feature  Changing the target and the length of time insulin is on board will adjust automated mode  Changing ICR/Basal Rates/ISF will only change setting in manual mode  SmartBolus Calculator - incorporates CGM data and trend data  Setup podder central account and linked [employer] account  Patient educated on insulin pump therapy, what a basal rate and bolus dose are, when to change pod, demonstrated how to prepare the syringe and pod for use with the pump, discussed ease of usage, basal and bolus, carbohydrate to insulin ratio, correction factors, approved areas to insert set, carbohydrate counting, error messages, explained the basal rates - patient will receive a little bit of insulin throughout 24 hours, bolus dose are delivered delivered by the inputting grams of carbohydrates, explained that patient will be counting carbohydrates and checking blood glucose before each meal, discussed when to change the pod, demonstrated how to fill the pod with insulin, how to apply pod and insert the needle, explained and demonstrated how to safely retract the needle and remove the pod, discussed ease of usage, carbohydrate counting, demonstrated applying device, inserting needle, administering bolus insulin, retracting  needle, and removing/disposing of pod. Patient states understanding and performed return demonstration. Patient started first pod in office. Patient provided with written literature and CDE contact information      Pump training was provided per Omni Pod protocol.  Patient is new to insulin pump therapy.      All settings obtained from Lizzy Devi's last office visit note.   Basal:  [place]- 8AM: 1.65 units/hr -- continue   8AM- 12AM ([place])- 1.95 units /hr   ICR:1:10-- set doses with meals -- 12-16 units --120 gram or 160 grams   ISF: 1:35  Target: 120  IOB: 3.5    Max basal rate: 5 u/hr    Glucose target : 120 mg/dL  Correct  over: 120mg/dl  Active insulin time: 3.5 hours  Max bolus: 30 units     Low reservoir insulin alarm: 10 units  Change pod alarm: every 3 days      Details of pump therapy were covered included following controller features and programming, pod activation, pod site selection and rotation, automatic pod priming and insertion, setting & editing basal rates in manual mode, giving bolus and other features in the set-up menu.  The following regarding the Omnipod 5 was covered:  During your first Pod wear, since no recent history is available, the Omnipod 5 System uses only your active Basal Program from Manual Mode as a starting point to adjust your insulin. After 48 hours of history is collected, which usually happens with the first Pod wear, SmartAdjust technology stops adjusting insulin against your active Basal Program and starts using the Adaptive Basal Rate for your automated insulin delivery with your next Pod change. With each Pod change, insulin delivery information is sent and saved in the Omnipod 5 Esequiel so that the next Pod that is started is updated with the new Adaptive Basal Rate. With each new Pod activation, the system adapts insulin delivery based on physiological needs and total daily insulin (TDI) delivered. After 2-3 Pod changes, adaptation generally stabilizes and automated insulin  delivery is based on this adaptation.  Patient demonstrated ability to program controller, activate and insert pod using aseptic technique.  Patient demonstrated ability to program Dexcom transmitter into controller and start automated limited mode.    Instructed pt on use of basic pump features ie...give a bolus, pause insulin, switch from manual to automated mode.  Reviewed features available in manual mode verses automated mode.   Reviewed when and how to use activity function in automated mode.  Reviewed site selection of pods, rotation of sites and hard stop to change pod every 72 hrs.   Instructed that insulin vial is good out of refrigeration for up to 28-30 days.   Reviewed treatment of hypoglycemia, hyperglycemia; sick day care, DKA, and troubleshooting of pump.  Advised to carry back-up supplies with her and discussed steps to take in case of connection loss.   Omni Pod 24-hour support line provided.       Podder username: [email or web address] Diop password: [email or web address]     Henryo username: [email or web address]   Home Blood Glucose Monitoring No   Acute Complications No,  Hyperglycemia  ABC's of Diabetes   Discussed importance of A1c less than 6.0 to reduce risk of micro and macro complications, controlled Blood Pressure 130/80 and Cholesterol Lab Values--Total Cholesterol <200mg, LDL < 100, HDL >45 men and >50 women, Triglycerides <150mg for prevention heart disease, heart attack, stroke.  how to use a glucometer  reviewed understanding diabetes distress  reviewed current level and goal level for HgbA1c, blood glucose, microalbumin, and lipids  reviewed signs/symptoms of hyperglycemia  reviewed what level blood sugar is considered high   reviewed at what level to contact the doctor and/or go to the emergency room.   Reviewed what patient can do to decrease blood sugar (ie drink more water, exercise, take medication as prescribed, monitor blood glucose)     Hypoglycemia  Reviewed  blood glucose goals, prevention, detection, and treatment of hypoglycemia, and when to contact the clinic.  reviewed signs/symptoms of hypoglycemia  reviewed what level blood sugar is considered low   reviewed at what level to contact the doctor and/or go to the emergency room.   Reviewed what patient can do to increase blood sugar (ie drink juice or ½ can soda, eat 5-6 pieces of candy, 4 glucose tablets, 1 tbsp sugar or honey, glucagon)  Reviewed when glucagon should be used  Reviewed how to use glucagon device (injections and inhaled)   Chronic Complications Yes,   Diabetes Care Schedule   A1c every 3 to 6 months  Lipid Panel every year  Microalbumin (urine test) once per year  Comprehensive Foot Exam once per year  Dilated Eye Exam at least once per year  Dental exam every 6 months  Flu Vaccination yearly   Shingles and Pneumonia Vaccination as recommended by physician      Reviewed diabetes care schedule, foot care guidelines, diabetes and retinopathy screening, s/s hypo and hyperglycemia, long/short term complication of uncontrolled DM, importance of compliance with treatment plan    Reviewed risk of heart disease  High blood pressure  High cholesterol  Diet  Limited activity  Medication non-adherence  Having diabetes    Reviewed that heart disease is the leading cause of death in people with uncontrolled diabetes  Reviewed ways to prevent complications:  Avoid smoking and other types of tobacco  Checking feet daily and having routine comprehensive foot exams  Controlling blood sugar  Controlling cholesterol and triglycerides  Having regular diabetic eye exams  Healthy eating and regular activity  Maintaining optimal blood glucose control   Psychosocial/Coping No        Today's interventions were provided through individual discussion, instruction, and written materials were provided.    Patient verbalized understanding of instruction and written materials.  Pt was able to return back demonstration of  instructions today. Patient understood key points, needs reinforcement and further instruction.     Diabetes Self-Management Care Plan Review and Evaluation of Progress:    During today's follow-up Cedrick's Diabetes Self-Management Care Plan progress was reviewed and progress was evaluated including his/her input. Cedrick has agreed to continue his/her journey to improve/maintain overall diabetes control by continuing to set health goals. See care plan progress below.      Care Plan: Diabetes Management   Updates made since 1/20/2024 12:00 AM        Problem: Healthy Eating         Goal: Eat 3 meals daily with 45-60g/3-4 servings of Carbohydrate per meal.    Start Date: 6/21/2022   Expected End Date: 12/31/2023   This Visit's Progress: On track   Recent Progress: On track   Priority: High   Barriers: Lack of Motivation to Change   Note:    Reviewed carb counting, portion control, importance of spacing meals throughout the day to prevent post prandial elevations.  Recommended low saturated fat, low sodium diet to aid in control of hypertension and cholesterol. Reviewed plate method and portion control, dining out tips, meal planning, reading a food label, healthy snack options, benefits of physical activity         Task: Discussed strategies for choosing healthier menu options when dining out. Completed 2/19/2024        Task: Review the importance of balancing carbohydrates with each meal using portion control techniques to count servings of carbohydrate and label reading to identify serving size and amount of total carbs per serving. Completed 2/19/2024        Task: Provided Sample plate method and reviewed the use of the plate to estimate amounts of carbohydrate per meal. Completed 2/19/2024          Follow Up Plan     Follow up in about 1 week (around 2/19/2024) for Insulin Pump Upload, Personal CGM Upload.    Today's care plan and follow up schedule was discussed with patient.  Cedrick verbalized understanding of  the care plan, goals, and agrees to follow up plan.        The patient was encouraged to communicate with his/her health care provider/physician and care team regarding his/her condition(s) and treatment.  I provided the patient with my contact information today and encouraged to contact me via phone or [facility] Patient Portal as needed.     Length of Visit   Total Time: 60 Minutes

## 2024-02-21 ENCOUNTER — TELEPHONE (OUTPATIENT)
Dept: DIABETES | Facility: CLINIC | Age: 59
End: 2024-02-21
Payer: COMMERCIAL

## 2024-02-21 DIAGNOSIS — L03.90 CELLULITIS, UNSPECIFIED CELLULITIS SITE: Primary | ICD-10-CM

## 2024-02-21 RX ORDER — MUPIROCIN 20 MG/G
OINTMENT TOPICAL
Qty: 22 G | Refills: 1 | Status: SHIPPED | OUTPATIENT
Start: 2024-02-21 | End: 2024-05-29

## 2024-02-21 RX ORDER — FLUCONAZOLE 150 MG/1
150 TABLET ORAL
Qty: 2 TABLET | Refills: 1 | Status: SHIPPED | OUTPATIENT
Start: 2024-02-21 | End: 2024-05-29

## 2024-02-21 RX ORDER — SULFAMETHOXAZOLE AND TRIMETHOPRIM 800; 160 MG/1; MG/1
1 TABLET ORAL 2 TIMES DAILY
Qty: 14 TABLET | Refills: 0 | Status: SHIPPED | OUTPATIENT
Start: 2024-02-21 | End: 2024-02-28

## 2024-02-21 NOTE — TELEPHONE ENCOUNTER
Patient complaining of a yeast infection from the Jardiance.  He is also complaining of a skin infection/cellulitis from Omnipod insulin pump  He reports a history of staff.  He says he has been squeezing pus out of the site  Recommending that he treat it with Wound Care, antibacterial soap and water, Bactroban ointment  Rx for Bactrim DS.  If signs and symptoms do not improve in the next 24-48 hours recommend in-person evaluation  Diflucan for yeast infection  Awaiting to hear from insurance company about denial of Farxiga  Have not heard about Mounjaro yet

## 2024-02-22 ENCOUNTER — TELEPHONE (OUTPATIENT)
Dept: DIABETES | Facility: CLINIC | Age: 59
End: 2024-02-22
Payer: COMMERCIAL

## 2024-02-22 ENCOUNTER — PATIENT MESSAGE (OUTPATIENT)
Dept: DIABETES | Facility: CLINIC | Age: 59
End: 2024-02-22
Payer: COMMERCIAL

## 2024-02-22 NOTE — TELEPHONE ENCOUNTER
[facility] [place] States that the appeal we submitted for farxiga was the final appeal, member would have to submit the external appeal, not the doctors office

## 2024-02-23 ENCOUNTER — TELEPHONE (OUTPATIENT)
Dept: PRIMARY CARE CLINIC | Facility: CLINIC | Age: 59
End: 2024-02-23
Payer: COMMERCIAL

## 2024-02-23 ENCOUNTER — TELEPHONE (OUTPATIENT)
Dept: DIABETES | Facility: CLINIC | Age: 59
End: 2024-02-23
Payer: COMMERCIAL

## 2024-02-23 DIAGNOSIS — Z79.4 TYPE 2 DIABETES MELLITUS WITH HYPERGLYCEMIA, WITH LONG-TERM CURRENT USE OF INSULIN: Chronic | ICD-10-CM

## 2024-02-23 DIAGNOSIS — E11.65 TYPE 2 DIABETES MELLITUS WITH HYPERGLYCEMIA, WITH LONG-TERM CURRENT USE OF INSULIN: Chronic | ICD-10-CM

## 2024-02-23 RX ORDER — TIRZEPATIDE 15 MG/.5ML
15 INJECTION, SOLUTION SUBCUTANEOUS
Qty: 4 PEN | Refills: 6 | Status: SHIPPED | OUTPATIENT
Start: 2024-02-23 | End: 2024-03-07 | Stop reason: SDUPTHER

## 2024-02-23 NOTE — TELEPHONE ENCOUNTER
Farxiga still denied   He has tried Jardiance again and is having yeast infections. He was on the Farxiga and tolerating well with no SE           [employer] is now approved

## 2024-02-23 NOTE — TELEPHONE ENCOUNTER
"----- Message from Kaley Simons sent at 2/23/2024  2:11 PM CST -----  Regarding: Pt called to schedule a work in appt for a staff infection in his arm and pt states that he is having trouble standing  Appointment Access Request:    Pt called to schedule a work in appt for a staff infection in his arm and pt states that he is having trouble standing.    Pt can be reached at [phone]    Instruct patient to Call 911 NOW!  "Based on the symptoms that you are experiencing, you may be having a medical emergency. You need to hang up and immediately dial 911."  Patient Navigator and OOC Front End: Direct the patient to hang up and contact 911 immediately.      "

## 2024-02-23 NOTE — TELEPHONE ENCOUNTER
----- Message from Kennedi Christina LPN sent at 2/22/2024  3:55 PM CST -----  States that was the final appeal, we can't submit a second appeal,  member would have to submit the external appeal,

## 2024-02-27 ENCOUNTER — TELEPHONE (OUTPATIENT)
Dept: DIABETES | Facility: CLINIC | Age: 59
End: 2024-02-27
Payer: COMMERCIAL

## 2024-02-28 ENCOUNTER — TELEPHONE (OUTPATIENT)
Dept: DIABETES | Facility: CLINIC | Age: 59
End: 2024-02-28
Payer: COMMERCIAL

## 2024-03-06 ENCOUNTER — TELEPHONE (OUTPATIENT)
Dept: DIABETES | Facility: CLINIC | Age: 59
End: 2024-03-06
Payer: COMMERCIAL

## 2024-03-07 ENCOUNTER — OFFICE VISIT (OUTPATIENT)
Dept: DIABETES | Facility: CLINIC | Age: 59
End: 2024-03-07
Payer: COMMERCIAL

## 2024-03-07 VITALS
BODY MASS INDEX: 35 KG/M2 | DIASTOLIC BLOOD PRESSURE: 67 MMHG | SYSTOLIC BLOOD PRESSURE: 120 MMHG | HEART RATE: 73 BPM | OXYGEN SATURATION: 97 % | WEIGHT: 250 LBS | HEIGHT: 71 IN

## 2024-03-07 DIAGNOSIS — E66.09 CLASS 1 OBESITY DUE TO EXCESS CALORIES WITH SERIOUS COMORBIDITY AND BODY MASS INDEX (BMI) OF 34.0 TO 34.9 IN ADULT: ICD-10-CM

## 2024-03-07 DIAGNOSIS — E11.69 DM TYPE 2 WITH DIABETIC MIXED HYPERLIPIDEMIA: ICD-10-CM

## 2024-03-07 DIAGNOSIS — E78.2 MIXED HYPERLIPIDEMIA: ICD-10-CM

## 2024-03-07 DIAGNOSIS — Z71.9 HEALTH EDUCATION/COUNSELING: ICD-10-CM

## 2024-03-07 DIAGNOSIS — Z91.199 NONCOMPLIANCE WITH DIABETES TREATMENT: ICD-10-CM

## 2024-03-07 DIAGNOSIS — E78.2 DM TYPE 2 WITH DIABETIC MIXED HYPERLIPIDEMIA: ICD-10-CM

## 2024-03-07 DIAGNOSIS — Z79.4 TYPE 2 DIABETES MELLITUS WITH HYPERGLYCEMIA, WITH LONG-TERM CURRENT USE OF INSULIN: Primary | Chronic | ICD-10-CM

## 2024-03-07 DIAGNOSIS — Z46.81 INSULIN PUMP FITTING OR ADJUSTMENT: ICD-10-CM

## 2024-03-07 DIAGNOSIS — I10 ESSENTIAL HYPERTENSION: ICD-10-CM

## 2024-03-07 DIAGNOSIS — E11.65 TYPE 2 DIABETES MELLITUS WITH HYPERGLYCEMIA, WITH LONG-TERM CURRENT USE OF INSULIN: Primary | Chronic | ICD-10-CM

## 2024-03-07 DIAGNOSIS — Z59.9 FINANCIAL DIFFICULTIES: ICD-10-CM

## 2024-03-07 DIAGNOSIS — Z96.41 INSULIN PUMP IN PLACE: ICD-10-CM

## 2024-03-07 DIAGNOSIS — Z12.5 PROSTATE CANCER SCREENING: ICD-10-CM

## 2024-03-07 LAB — GLUCOSE SERPL-MCNC: 141 MG/DL (ref 70–110)

## 2024-03-07 PROCEDURE — 3066F NEPHROPATHY DOC TX: CPT | Mod: CPTII,S$GLB,, | Performed by: NURSE PRACTITIONER

## 2024-03-07 PROCEDURE — 3061F NEG MICROALBUMINURIA REV: CPT | Mod: CPTII,S$GLB,, | Performed by: NURSE PRACTITIONER

## 2024-03-07 PROCEDURE — 99999 PR PBB SHADOW E&M-EST. PATIENT-LVL V: CPT | Mod: PBBFAC,,, | Performed by: NURSE PRACTITIONER

## 2024-03-07 PROCEDURE — 3008F BODY MASS INDEX DOCD: CPT | Mod: CPTII,S$GLB,, | Performed by: NURSE PRACTITIONER

## 2024-03-07 PROCEDURE — 99214 OFFICE O/P EST MOD 30 MIN: CPT | Mod: S$GLB,,, | Performed by: NURSE PRACTITIONER

## 2024-03-07 PROCEDURE — 3078F DIAST BP <80 MM HG: CPT | Mod: CPTII,S$GLB,, | Performed by: NURSE PRACTITIONER

## 2024-03-07 PROCEDURE — 82962 GLUCOSE BLOOD TEST: CPT | Mod: S$GLB,,, | Performed by: NURSE PRACTITIONER

## 2024-03-07 PROCEDURE — 3074F SYST BP LT 130 MM HG: CPT | Mod: CPTII,S$GLB,, | Performed by: NURSE PRACTITIONER

## 2024-03-07 PROCEDURE — 95251 CONT GLUC MNTR ANALYSIS I&R: CPT | Mod: S$GLB,,, | Performed by: NURSE PRACTITIONER

## 2024-03-07 PROCEDURE — 1159F MED LIST DOCD IN RCRD: CPT | Mod: CPTII,S$GLB,, | Performed by: NURSE PRACTITIONER

## 2024-03-07 PROCEDURE — 3051F HG A1C>EQUAL 7.0%<8.0%: CPT | Mod: CPTII,S$GLB,, | Performed by: NURSE PRACTITIONER

## 2024-03-07 PROCEDURE — 1160F RVW MEDS BY RX/DR IN RCRD: CPT | Mod: CPTII,S$GLB,, | Performed by: NURSE PRACTITIONER

## 2024-03-07 RX ORDER — TIRZEPATIDE 15 MG/.5ML
15 INJECTION, SOLUTION SUBCUTANEOUS
Qty: 4 PEN | Refills: 6 | Status: SHIPPED | OUTPATIENT
Start: 2024-03-07

## 2024-03-07 SDOH — SOCIAL DETERMINANTS OF HEALTH (SDOH): PROBLEM RELATED TO HOUSING AND ECONOMIC CIRCUMSTANCES, UNSPECIFIED: Z59.9

## 2024-03-07 NOTE — PROGRESS NOTES
CC:   Chief Complaint   Patient presents with    Diabetes Mellitus       HPI: Cedrick Saunders is a 58 y.o. male presents for a follow up visit today for the management of T2DM.     He was diagnosed with Type 2 diabetes at age 37 with s/s of polyuria, nocturia, and polydipsia and BG was 600. A1c at diagnosis was > 12%. He was initially on oral medications Actos and Metformin. Insulin therapy was started in 2016.     Family hx of diabetes: father, paternal grandmother   Hospitalized for diabetes: denies     No personal or FH of thyroid cancer or personal of pancreatic cancer or pancreatitis.       Our last visit was in November of 2023  At that visit we increase the Mounjaro to 10 mg weekly---he ended up titrating up on the Mounjaro to 15 mg weekly---but then was off the Mounjaro for a little while due to insurance issues.  He used Ozempic while off the Mounjaro  Continued the metformin a 1000 mg twice a day  Continued the Farxiga  Adjusted his basal setting on his pump and adjusted his set meal doses  In January he changed insurance is [employer] we had a lot of insurance issues.  At 1st they denied the Mounjaro.  We had to appeal.  It took until the end of February for them to accept the appeal  They are still denying the Farxiga despite the appeal with him not being able to tolerate Jardiance  I had recommended that he reach out to the company that makes Farxiga to see about getting the medication for free since it has been denied  Insurance also made us change his Fiasp to Humalog   Recent A1c is 7.4%  Blood sugars are above goal on Dexcom and Omnipod 5 download  See attached downloads                      DIABETES COMPLICATIONS: none      Diabetes Management Status    ASA: not taking     Statin: On Crestor 5 mg nightly-- stopped Lipitor due to arthralgia -- and joint pain resolved with stopping the Lipitor.   ACE/ARB: Taking- Lisinopril 20 mg     The 10-year ASCVD risk score (Leonie ZIEGLER, et al.,  2019) is: 15%    Values used to calculate the score:      Age: 58 years      Sex: Male      Is Non- : No      Diabetic: Yes      Tobacco smoker: No      Systolic Blood Pressure: 120 mmHg      Is BP treated: Yes      HDL Cholesterol: 37 mg/dL      Total Cholesterol: 140 mg/dL      Screening or Prevention Patient's value Goal Complete/Controlled?   HgA1C Testing and Control   Lab Results   Component Value Date    HGBA1C 7.4 (H) 03/01/2024      Annually/Less than 8% No   Lipid profile : 08/01/2023 Annually Yes   LDL control Lab Results   Component Value Date    LDLCALC 82.4 08/01/2023    Annually/Less than 100 mg/dl  Yes   Nephropathy screening Lab Results   Component Value Date    LABMICR 10.0 03/04/2024     Lab Results   Component Value Date    PROTEINUA Trace (A) 05/19/2006    Annually Yes   Blood pressure BP Readings from Last 1 Encounters:   03/07/24 120/67    Less than 140/90 Yes   Dilated retinal exam : 08/17/2023- Annually No   Foot exam   : 03/07/2024 Annually Yes       CURRENT A1C:    Hemoglobin A1C   Date Value Ref Range Status   03/01/2024 7.4 (H) 4.0 - 5.6 % Final     Comment:     ADA Screening Guidelines:  5.7-6.4%  Consistent with prediabetes  >or=6.5%  Consistent with diabetes    High levels of fetal hemoglobin interfere with the HbA1C  assay. Heterozygous hemoglobin variants (HbS, HgC, etc)do  not significantly interfere with this assay.   However, presence of multiple variants may affect accuracy.     11/15/2023 7.3 (H) 4.0 - 5.6 % Final     Comment:     ADA Screening Guidelines:  5.7-6.4%  Consistent with prediabetes  >or=6.5%  Consistent with diabetes    High levels of fetal hemoglobin interfere with the HbA1C  assay. Heterozygous hemoglobin variants (HbS, HgC, etc)do  not significantly interfere with this assay.   However, presence of multiple variants may affect accuracy.     08/01/2023 8.2 (H) 4.0 - 5.6 % Final     Comment:     ADA Screening Guidelines:  5.7-6.4%   Consistent with prediabetes  >or=6.5%  Consistent with diabetes    High levels of fetal hemoglobin interfere with the HbA1C  assay. Heterozygous hemoglobin variants (HbS, HgC, etc)do  not significantly interfere with this assay.   However, presence of multiple variants may affect accuracy.         GOAL A1C: 7% or less without hypoglycemia.       DM MEDICATIONS USED IN THE PAST: Metformin, Januvia, Basaglar, Farxiga, Glyburide, Actos   Invokana- yeast infections, Tresiba, Ozempic   VGo  Novolog   Dexcom -- off due to cost   Fiasp --  Jardiance - yeast infections   Humalog   Omnipod 5       CURRENT DIABETES MEDICATIONS: Metformin 1000 mg BID,   Jardiance 25 mg -- off the Farxiga -- taking the jardiance right now   Mounjaro 15 mg weekly on Monday-- recently restarted   Omnipod 5    Insulin Pump:Omnipod 5 with Humalog     Pump settings:  Basal:   [place]- 8AM: 1.65 units/hr -- continue   8AM- 12AM ([place])- 1.95 units /hr -- increased   ICR:1:10-- set doses with meals --8-10 with meals and 4-5 u for snack   ISF: 1:35  Target: 120  IOB: 3.5       Pump site change: q 2-3 days   Cartridge change: q 2-3 days  Insulin TDD: 62.6 units    Basal 59%   Bolus  41%        Back up Lantus/Levemir: none-- will put back up tresiba at pharmacy     Patient's pump was downloaded in clinic today and reviewed with patient.   Please see attached documents for pump download.           BLOOD GLUCOSE MONITORING:    Sensor type: Dexcom G7   Average BG readin  Time in range: 44%   37% high   18% very high   0% low   0% very low   Site change:  q10 days  Estimated A1c-- N/A     2 weeks prior -- BG was 170 on average -- estimated A1c 7.4%     Supplies with pharmacy     Sensor was downloaded in clinic today and reviewed with patient.   Please see attached document for download.         BG in clinic   Results for orders placed or performed in visit on 24   POCT Glucose, Hand-Held Device   Result Value Ref Range    POC Glucose 141 (A) 70 - 110  MG/DL         HYPOGLYCEMIA:  rarely    Carries crackers / juice       MEALS: eating 3 meals per day   + dietary indiscretions-- nadia in the evening   Diet green tea or water.   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   HENT:  Negative for trouble swallowing.    Eyes:  Negative for visual disturbance.   Respiratory:  Negative for shortness of breath.    Cardiovascular:  Negative for chest pain.   Gastrointestinal:  Negative for nausea.   Endocrine: Negative for polydipsia, polyphagia and polyuria.   Genitourinary:         No Nocturia    Musculoskeletal:  Positive for arthralgias (left shoulder), back pain and neck pain.   Skin:  Negative for rash and wound.   Neurological:  Negative for numbness.   Psychiatric/Behavioral:  Negative for dysphoric mood.        Physical Exam   Physical Exam  Vitals and nursing note reviewed.   Constitutional:       Appearance: He is well-developed. He is obese.   HENT:      Head: Normocephalic and atraumatic.      Right Ear: External ear normal.      Left Ear: External ear normal.      Nose: Nose normal.   Neck:      Thyroid: No thyromegaly.      Trachea: No tracheal deviation.   Cardiovascular:      Rate and Rhythm: Normal rate and regular rhythm.      Heart sounds: No murmur heard.  Pulmonary:      Effort: Pulmonary effort is normal. No respiratory distress.      Breath sounds: Normal breath sounds.   Abdominal:      Palpations: Abdomen is soft.      Tenderness: There is no abdominal tenderness.      Hernia: No hernia is present.   Musculoskeletal:      Cervical back: Normal range of motion and neck supple.   Skin:     General: Skin is warm and dry.      Capillary Refill: Capillary refill takes less than 2 seconds.      Findings: No rash.      Comments: injection sites with no complications.  No lipo hypertropthy  or atrophy   Neurological:      Mental Status: He is alert and oriented to person, place, and time.      Cranial Nerves: No cranial nerve deficit.   Psychiatric:         Mood and Affect: Mood is not depressed. Affect is not tearful.         Behavior: Behavior normal.         Judgment: Judgment normal.         FOOT EXAMINATION:  Appropriate footwear       Protective Sensation (w/ 10 gram monofilament):  Right: Intact  Left: Intact    Visual Inspection:  Normal -  Bilateral and Nails Intact - without Evidence of Foot Deformity- Bilateral    Pedal Pulses:   Right: Present  Left: Present    Posterior Tibialis Pulses:   Right:Present  Left: Present          Lab Results   Component Value Date    TSH 2.458 03/01/2024           Type 2 diabetes mellitus with hyperglycemia, with long-term current use of insulin  Uncontrolled   A1c above goal   Dexcom readings above goal -- right now off the dexcom G6-- needs a transmitter -- gave him a dexcom G6 sample in office today   + prandial excursions r/t dietary indiscretions and/ or missed doses/ bolus timing off of bolus doses   Cost is an issue with diabetes management.   We had a lot of insurance issues at the beginning of this year- causing some noncompliance with medications   No longer covered for Fiasp or Humalog   We had to appeal for the Mounjaro -- so we just restarted   He needs to reach out to [employer] to see if he can get the Farxiga for free       Long discussion with patient about proper Omnipod 5 use and pump optimization  Asked that he use the carbohydrate calculator to calculate his bolus doses and entering his blood sugar readings every time he is eating.  We did discuss that he is not actually going to carbohydrate count but that he is going to enter in doses-- so 100 grams of CHO for 10 units and etc         Medication changes:       Continue Metformin 1000 mg 2 times per day     Continue Mounjaro 15 mg weekly      Continue either Jardiance 25 mg daily or Farxiga  10 mg daily-- he prefers the Farxiga. He will reach out to the company to see if he can get the Farxiga for free since his insurance denied our appeal  We discussed that if he stays on the Jardiance that he needs to make sure he is drinking lots of water and that he bring a change of clothes and change shelter through the day to prevent a moist environment for yeast infections to occur      Insulin Pump:Omnipod 5 with Humalog ( on Humalog per his insurance)   Pump settings:  Basal:   [place]- 8AM: 1.65 units/hr --  8AM- 12AM ([place])- 1.95 units /hr -  ICR:1:10-- set doses with meals -- 10-12 units --100 gram or 120 grams -- increase prandial insulin administration   ISF: 1:30-- tighten   Target: 120  IOB: 3.5        -- Reviewed goals of therapy are to get the best control we can without hypoglycemia  -- Reviewed patient's current insulin regimen. Clarified proper insulin dose and timing in relation to meals, etc. Insulin injection sites and proper rotation instructed.    -- Advised frequent self blood glucose monitoring.  Patient encouraged to document glucose results and bring them to every clinic visit -   Continue Dexcom G6 - supplies from pharmacy   -- Hypoglycemia precautions discussed. Instructed on precautions before driving.    -- Call for Bg repeatedly < 70 or > 180.   -- Close adherence to lifestyle changes recommended.   -- Periodic follow ups for eye evaluations, foot care and dental care suggested.          Patient has diabetes mellitus and manages diabetes with intensive insulin regimen and uses prandial and basal insulin daily-- via omnipod 5   Patient requires a therapeutic CGM and is willing to use therapeutic CGM for the necessary frequent adjustments of insulin therapy.  I have completed an in-person visit during the previous 6 months and will continue to have in-person visits every 6 months to assess adherence to their CGM regimen and diabetes treatment plan.  Due to COVID pandemic and need for remote  monitoring this tool is medically necessary         Essential hypertension  BP goal is < 140/90.   Tolerating ACEi  Controlled   Blood pressure goals discussed with patient      DM type 2 with diabetic mixed hyperlipidemia  See above     Mixed hyperlipidemia  On statin per ADA recommendations  LDL goal < 100.  Continue statin.    Class 1 obesity due to excess calories with serious comorbidity and body mass index (BMI) of 34.0 to 34.9 in adult  Body mass index is 34.87 kg/m².  Increases insulin resistance.   Discussed DM diet and exercise.       Insulin pump fitting or adjustment  See above    Insulin pump in place  See above         I spent a total of 30 minutes on the day of the visit.This includes face to face time and non-face to face time preparing to see the patient (eg, review of tests), obtaining and/or reviewing separately obtained history, documenting clinical information in the electronic or other health record, independently interpreting results and communicating results to the patient/family/caregiver, or care coordinator.          Follow up in about 3 months (around 6/7/2024).  Dexcom sample today - we need to get him back into auto mode on pump   Follow up with me in 3 months with labs prior           Orders Placed This Encounter   Procedures    Hemoglobin A1C     Standing Status:   Future     Standing Expiration Date:   9/7/2025    Comprehensive Metabolic Panel     Standing Status:   Future     Standing Expiration Date:   9/7/2025    Lipid Panel     Standing Status:   Future     Standing Expiration Date:   9/7/2025    PSA, SCREENING     Standing Status:   Future     Standing Expiration Date:   6/5/2025    POCT Glucose, Hand-Held Device         Recommendations were discussed with the patient in detail  The patient verbalized understanding and agrees with the plan outlined as above.

## 2024-03-07 NOTE — ASSESSMENT & PLAN NOTE
Body mass index is 34.87 kg/m².  Increases insulin resistance.   Discussed DM diet and exercise.

## 2024-03-07 NOTE — ASSESSMENT & PLAN NOTE
Uncontrolled   A1c above goal   Dexcom readings above goal -- right now off the dexcom G6-- needs a transmitter -- gave him a dexcom G6 sample in office today   + prandial excursions r/t dietary indiscretions and/ or missed doses/ bolus timing off of bolus doses   Cost is an issue with diabetes management.   We had a lot of insurance issues at the beginning of this year- causing some noncompliance with medications   No longer covered for Fiasp or Humalog   We had to appeal for the Mounjaro -- so we just restarted   He needs to reach out to [employer] to see if he can get the Farxiga for free       Long discussion with patient about proper Omnipod 5 use and pump optimization  Asked that he use the carbohydrate calculator to calculate his bolus doses and entering his blood sugar readings every time he is eating.  We did discuss that he is not actually going to carbohydrate count but that he is going to enter in doses-- so 100 grams of CHO for 10 units and etc         Medication changes:       Continue Metformin 1000 mg 2 times per day     Continue Mounjaro 15 mg weekly      Continue either Jardiance 25 mg daily or Farxiga 10 mg daily-- he prefers the Farxiga. He will reach out to the company to see if he can get the Farxiga for free since his insurance denied our appeal  We discussed that if he stays on the Jardiance that he needs to make sure he is drinking lots of water and that he bring a change of clothes and change residential through the day to prevent a moist environment for yeast infections to occur      Insulin Pump:Omnipod 5 with Humalog ( on Humalog per his insurance)   Pump settings:  Basal:   [place]- 8AM: 1.65 units/hr --  8AM- 12AM ([place])- 1.95 units /hr -  ICR:1:10-- set doses with meals -- 10-12 units --100 gram or 120 grams -- increase prandial insulin administration   ISF: 1:30-- tighten   Target: 120  IOB: 3.5        -- Reviewed goals of therapy are to get the best control we can without hypoglycemia  --  Reviewed patient's current insulin regimen. Clarified proper insulin dose and timing in relation to meals, etc. Insulin injection sites and proper rotation instructed.    -- Advised frequent self blood glucose monitoring.  Patient encouraged to document glucose results and bring them to every clinic visit -   Continue Dexcom G6 - supplies from pharmacy   -- Hypoglycemia precautions discussed. Instructed on precautions before driving.    -- Call for Bg repeatedly < 70 or > 180.   -- Close adherence to lifestyle changes recommended.   -- Periodic follow ups for eye evaluations, foot care and dental care suggested.          Patient has diabetes mellitus and manages diabetes with intensive insulin regimen and uses prandial and basal insulin daily-- via omnipod 5   Patient requires a therapeutic CGM and is willing to use therapeutic CGM for the necessary frequent adjustments of insulin therapy.  I have completed an in-person visit during the previous 6 months and will continue to have in-person visits every 6 months to assess adherence to their CGM regimen and diabetes treatment plan.  Due to COVID pandemic and need for remote monitoring this tool is medically necessary

## 2024-03-18 ENCOUNTER — E-VISIT (OUTPATIENT)
Dept: PRIMARY CARE CLINIC | Facility: CLINIC | Age: 59
End: 2024-03-18
Payer: COMMERCIAL

## 2024-03-18 DIAGNOSIS — R19.7 DIARRHEA, UNSPECIFIED TYPE: Primary | ICD-10-CM

## 2024-03-18 PROCEDURE — 99499 UNLISTED E&M SERVICE: CPT | Mod: 95,,, | Performed by: FAMILY MEDICINE

## 2024-05-23 DIAGNOSIS — Z79.4 TYPE 2 DIABETES MELLITUS WITH HYPERGLYCEMIA, WITH LONG-TERM CURRENT USE OF INSULIN: Chronic | ICD-10-CM

## 2024-05-23 DIAGNOSIS — E11.65 TYPE 2 DIABETES MELLITUS WITH HYPERGLYCEMIA, WITH LONG-TERM CURRENT USE OF INSULIN: Chronic | ICD-10-CM

## 2024-05-23 RX ORDER — METFORMIN HYDROCHLORIDE 1000 MG/1
1000 TABLET ORAL 2 TIMES DAILY WITH MEALS
Qty: 180 TABLET | Refills: 0 | Status: SHIPPED | OUTPATIENT
Start: 2024-05-23

## 2024-05-29 ENCOUNTER — OFFICE VISIT (OUTPATIENT)
Dept: PRIMARY CARE CLINIC | Facility: CLINIC | Age: 59
End: 2024-05-29
Payer: COMMERCIAL

## 2024-05-29 VITALS
WEIGHT: 241.75 LBS | HEIGHT: 71 IN | OXYGEN SATURATION: 98 % | RESPIRATION RATE: 19 BRPM | BODY MASS INDEX: 33.85 KG/M2 | HEART RATE: 66 BPM | DIASTOLIC BLOOD PRESSURE: 68 MMHG | SYSTOLIC BLOOD PRESSURE: 116 MMHG

## 2024-05-29 DIAGNOSIS — M18.11 ARTHRITIS OF CARPOMETACARPAL (CMC) JOINT OF RIGHT THUMB: ICD-10-CM

## 2024-05-29 DIAGNOSIS — R07.89 ATYPICAL CHEST PAIN: ICD-10-CM

## 2024-05-29 DIAGNOSIS — I25.10 NONOBSTRUCTIVE ATHEROSCLEROSIS OF CORONARY ARTERY: ICD-10-CM

## 2024-05-29 DIAGNOSIS — R55 NEAR SYNCOPE: Primary | ICD-10-CM

## 2024-05-29 DIAGNOSIS — R07.9 CHEST PAIN, UNSPECIFIED TYPE: ICD-10-CM

## 2024-05-29 DIAGNOSIS — Z01.00 DIABETIC EYE EXAM: ICD-10-CM

## 2024-05-29 DIAGNOSIS — I10 ESSENTIAL HYPERTENSION: ICD-10-CM

## 2024-05-29 DIAGNOSIS — E11.9 DIABETIC EYE EXAM: ICD-10-CM

## 2024-05-29 PROCEDURE — 99214 OFFICE O/P EST MOD 30 MIN: CPT | Mod: S$GLB,,, | Performed by: FAMILY MEDICINE

## 2024-05-29 PROCEDURE — 4010F ACE/ARB THERAPY RXD/TAKEN: CPT | Mod: CPTII,S$GLB,, | Performed by: FAMILY MEDICINE

## 2024-05-29 PROCEDURE — 3078F DIAST BP <80 MM HG: CPT | Mod: CPTII,S$GLB,, | Performed by: FAMILY MEDICINE

## 2024-05-29 PROCEDURE — 3066F NEPHROPATHY DOC TX: CPT | Mod: CPTII,S$GLB,, | Performed by: FAMILY MEDICINE

## 2024-05-29 PROCEDURE — 3074F SYST BP LT 130 MM HG: CPT | Mod: CPTII,S$GLB,, | Performed by: FAMILY MEDICINE

## 2024-05-29 PROCEDURE — 99999 PR PBB SHADOW E&M-EST. PATIENT-LVL IV: CPT | Mod: PBBFAC,,, | Performed by: FAMILY MEDICINE

## 2024-05-29 PROCEDURE — 93005 ELECTROCARDIOGRAM TRACING: CPT | Mod: S$GLB,,, | Performed by: FAMILY MEDICINE

## 2024-05-29 PROCEDURE — 93010 ELECTROCARDIOGRAM REPORT: CPT | Mod: S$GLB,,, | Performed by: INTERNAL MEDICINE

## 2024-05-29 PROCEDURE — 3008F BODY MASS INDEX DOCD: CPT | Mod: CPTII,S$GLB,, | Performed by: FAMILY MEDICINE

## 2024-05-29 PROCEDURE — 1160F RVW MEDS BY RX/DR IN RCRD: CPT | Mod: CPTII,S$GLB,, | Performed by: FAMILY MEDICINE

## 2024-05-29 PROCEDURE — 3061F NEG MICROALBUMINURIA REV: CPT | Mod: CPTII,S$GLB,, | Performed by: FAMILY MEDICINE

## 2024-05-29 PROCEDURE — 3051F HG A1C>EQUAL 7.0%<8.0%: CPT | Mod: CPTII,S$GLB,, | Performed by: FAMILY MEDICINE

## 2024-05-29 PROCEDURE — 1159F MED LIST DOCD IN RCRD: CPT | Mod: CPTII,S$GLB,, | Performed by: FAMILY MEDICINE

## 2024-05-29 RX ORDER — ESCITALOPRAM OXALATE 10 MG/1
TABLET ORAL
Qty: 90 TABLET | Refills: 0 | Status: SHIPPED | OUTPATIENT
Start: 2024-05-29 | End: 2024-06-11

## 2024-05-29 NOTE — TELEPHONE ENCOUNTER
Refill Decision Note   Cedrick Saunders  is requesting a refill authorization.  Brief Assessment and Rationale for Refill:  Approve     Medication Therapy Plan:  Reviewed acute care/admission visit notes; No follow up with PCP recommended by acute care provider; Approved per protocol      Extended chart review required: Yes   Comments:     Note composed:10:15 AM 05/29/2024

## 2024-05-29 NOTE — PROGRESS NOTES
"Subjective:       Patient ID: Cedrick Saunders is a 58 y.o. male.    Chief Complaint: Wrist Pain (right), Dizziness, and Blurred Vision    Got very dizzy and lightheaded multiple times this past weekend while working in the heat on [place]. 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 Had mild, non-radiating chest pressure later that evening with mild SoB. Very tired the past few days.  Saw a cardiologist over 15 years ago and had an angiogram, reportedly had "a proximally 10% blockage" at the time    Wrist Pain   The pain is present in the right hand. This is a new problem. The current episode started more than 1 month ago. There has been no history of extremity trauma. The problem occurs constantly. The problem has been gradually worsening. The quality of the pain is described as aching. Pertinent negatives include no fever, joint locking, joint swelling, numbness or tingling. The symptoms are aggravated by activity. He has tried nothing for the symptoms.   Dizziness:    Associated symptoms: light-headedness and chest pain.no fever and no weakness.    Review of Systems   Constitutional:  Positive for fatigue. Negative for fever.   HENT:  Negative for trouble swallowing.    Respiratory:  Positive for shortness of breath.    Cardiovascular:  Positive for chest pain.   Musculoskeletal:  Positive for arthralgias.   Neurological:  Positive for dizziness and light-headedness. Negative for tingling, syncope, speech difficulty, weakness and numbness.       Objective:      Vitals:    05/29/24 0948   BP: 116/68   BP Location: Left arm   Patient Position: Sitting   BP Method: Medium (Manual)   Pulse: 66   Resp: 19   SpO2: 98%   Weight: 109.6 kg (241 lb 11.8 oz)   Height: 5' 11" (1.803 m)     BP Readings from Last 5 Encounters:   05/29/24 " 116/68   03/07/24 120/67   02/23/24 (!) 110/57   11/14/23 124/79   09/30/23 (!) 140/87     Wt Readings from Last 5 Encounters:   05/29/24 109.6 kg (241 lb 11.8 oz)   03/07/24 113.4 kg (250 lb)   02/23/24 108.9 kg (240 lb)   11/14/23 114.8 kg (253 lb)   10/10/23 110.7 kg (244 lb)     Physical Exam  Vitals and nursing note reviewed.   Constitutional:       General: He is not in acute distress.     Appearance: Normal appearance. He is well-developed.   HENT:      Head: Normocephalic and atraumatic.   Neck:      Vascular: No carotid bruit.   Cardiovascular:      Rate and Rhythm: Normal rate and regular rhythm.      Heart sounds: Normal heart sounds.   Pulmonary:      Effort: Pulmonary effort is normal.      Breath sounds: Normal breath sounds.   Musculoskeletal:      Right hand: Tenderness (over CMC joint) and bony tenderness present. No swelling or deformity. Decreased range of motion. Normal strength. Normal pulse.      Right lower leg: No edema.      Left lower leg: No edema.   Skin:     General: Skin is warm and dry.   Neurological:      Mental Status: He is alert and oriented to person, place, and time.   Psychiatric:         Mood and Affect: Mood normal.         Behavior: Behavior normal.         Lab Results   Component Value Date    WBC 5.39 03/01/2024    HGB 15.4 03/01/2024    HCT 46.7 03/01/2024     03/01/2024    CHOL 140 08/01/2023    TRIG 103 08/01/2023    HDL 37 (L) 08/01/2023    ALT 25 03/01/2024    AST 16 03/01/2024     (L) 03/01/2024    K 4.7 03/01/2024     03/01/2024    CREATININE 0.9 03/01/2024    BUN 18 03/01/2024    CO2 21 (L) 03/01/2024    TSH 2.458 03/01/2024    PSA 2.3 06/10/2022    HGBA1C 7.4 (H) 03/01/2024      Assessment:       1. Near syncope    2. Diabetic eye exam    3. Essential hypertension    4. Atypical chest pain    5. Arthritis of carpometacarpal (CMC) joint of right thumb    6. Nonobstructive atherosclerosis of coronary artery    7. Chest pain, unspecified type         Plan:       Near syncope  -     EKG 12-lead  -     NM Myocardial Perfusion Spect Multi Exer; Future; Expected date: 06/05/2024  -     Stress test; Future; Expected date: 06/05/2024  Normal sinus rhythm on EKG.  Suspect events of this past weekend were likely due to dehydration plus/minus mild hyponatremia.  However, in light of known history of nonobstructive CAD, current symptoms and other risk factors, needs further workup.  Essential hypertension  Continue current meds  Atypical chest pain  -     EKG 12-lead  -     NM Myocardial Perfusion Spect Multi Exer; Future; Expected date: 06/05/2024  -     Stress test; Future; Expected date: 06/05/2024    Arthritis of carpometacarpal (CMC) joint of right thumb  Advised to use brace, ice as needed  Nonobstructive atherosclerosis of coronary artery  -     NM Myocardial Perfusion Spect Multi Exer; Future; Expected date: 06/05/2024  -     Stress test; Future; Expected date: 06/05/2024    Chest pain, unspecified type  -     NM Myocardial Perfusion Spect Multi Exer; Future; Expected date: 06/05/2024  -     Stress test; Future; Expected date: 06/05/2024      Medication List with Changes/Refills   Current Medications    ASPIRIN (ECOTRIN) 81 MG EC TABLET    Take 81 mg by mouth once daily.    BLOOD SUGAR DIAGNOSTIC (ACCU-CHEK GUIDE TEST STRIPS) STRP    TO USE TO CHECK BLOOD SUGAR 4 TIMES DAILY. TO USE WITH ACCU CHECK GUIDE ME    BLOOD-GLUCOSE SENSOR (DEXCOM G6 SENSOR) HAROON    1 sensor every 10 days    BLOOD-GLUCOSE TRANSMITTER (DEXCOM G6 TRANSMITTER) HAROON    1 transmitter every 3 months    DICLOFENAC (VOLTAREN) 75 MG EC TABLET    Take 1 tablet (75 mg total) by mouth 2 (two) times daily as needed (pain).    EMPAGLIFLOZIN (JARDIANCE) 25 MG TABLET    Take 25 mg by mouth once daily.    INSULIN LISPRO (HUMALOG U-100 INSULIN) 100 UNIT/ML INJECTION    To use continuously with Omnipod 5 insulin pump. Max TDD of 100 units    INSULIN PUMP CART,AUTOMATED,BT (OMNIPOD 5 G6 PODS, GEN 5,) CRTG    " Inject 1 Device into the skin every 48 hours.    LISINOPRIL-HYDROCHLOROTHIAZIDE (PRINZIDE,ZESTORETIC) 20-12.5 MG PER TABLET    Take 1 tablet by mouth once daily    METFORMIN (GLUCOPHAGE) 1000 MG TABLET    TAKE 1 TABLET BY MOUTH TWICE DAILY WITH MEALS    ROSUVASTATIN (CRESTOR) 5 MG TABLET    Take 1 tablet by mouth once daily    TIRZEPATIDE (MOUNJARO) 15 MG/0.5 ML PNIJ    Inject 15 mg into the skin every 7 days.    TRUE METRIX AIR GLUCOSE METER MISC    use as directed   Changed and/or Refilled Medications    Modified Medication Previous Medication    ESCITALOPRAM OXALATE (LEXAPRO) 10 MG TABLET EScitalopram oxalate (LEXAPRO) 10 MG tablet       Take 1 tablet by mouth once daily    Take 1 tablet by mouth once daily   Discontinued Medications    DAPAGLIFLOZIN PROPANEDIOL (FARXIGA) 10 MG TABLET    Take 1 tablet (10 mg total) by mouth once daily.    DICLOFENAC SODIUM (VOLTAREN) 1 % GEL    Apply 2 g topically 2 (two) times daily as needed (shoulder pain).    FLUCONAZOLE (DIFLUCAN) 150 MG TAB    Take 1 tablet (150 mg total) by mouth Every 3 (three) days. May repeat in 3 days if needed    IBUPROFEN (ADVIL,MOTRIN) 200 MG TABLET    Take 800 mg by mouth daily as needed for Pain.    INSULIN DEGLUDEC (TRESIBA FLEXTOUCH U-200) 200 UNIT/ML (3 ML) INSULIN PEN    Inject 42 Units into the skin once daily.    MUPIROCIN (BACTROBAN) 2 % OINTMENT    Apply to affected area 3 times daily    PEN NEEDLE, DIABETIC (BD ULTRA-FINE YAMILETH PEN NEEDLE) 32 GAUGE X 5/32" NDLE    To use with insulin 4 times per day           "

## 2024-05-30 LAB
OHS QRS DURATION: 100 MS
OHS QTC CALCULATION: 460 MS

## 2024-06-10 ENCOUNTER — PATIENT MESSAGE (OUTPATIENT)
Dept: PRIMARY CARE CLINIC | Facility: CLINIC | Age: 59
End: 2024-06-10
Payer: COMMERCIAL

## 2024-06-11 RX ORDER — ESCITALOPRAM OXALATE 20 MG/1
20 TABLET ORAL DAILY
Qty: 90 TABLET | Refills: 0 | Status: SHIPPED | OUTPATIENT
Start: 2024-06-11

## 2024-06-18 ENCOUNTER — TELEPHONE (OUTPATIENT)
Dept: DIABETES | Facility: CLINIC | Age: 59
End: 2024-06-18
Payer: COMMERCIAL

## 2024-06-19 ENCOUNTER — PATIENT MESSAGE (OUTPATIENT)
Dept: DIABETES | Facility: CLINIC | Age: 59
End: 2024-06-19
Payer: COMMERCIAL

## 2024-06-19 ENCOUNTER — TELEPHONE (OUTPATIENT)
Dept: DIABETES | Facility: CLINIC | Age: 59
End: 2024-06-19
Payer: COMMERCIAL

## 2024-06-19 NOTE — TELEPHONE ENCOUNTER
----- Message from Allison Lanza sent at 6/19/2024 10:53 AM CDT -----  Regarding: return call regarding need 2nd lab draw  Contact: PT  [phone]  The patient called requesting to to speak to nurse -   Regarding the labs states at the lab draw 4 tubes of blood were drawn but on lipid panel processed - PT asking why he needs to return wasn't that enough blood that was drawn w/the 4 tubes?? Also states if was a mistake on part of the lab he doesn't want to be charged for another labs appt.   Please advise at your earliest opportunity     No further information provided    Patient can be contacted @# [phone]

## 2024-06-23 DIAGNOSIS — E78.2 MIXED HYPERLIPIDEMIA: ICD-10-CM

## 2024-06-23 RX ORDER — ROSUVASTATIN CALCIUM 5 MG/1
5 TABLET, COATED ORAL
Qty: 90 TABLET | Refills: 3 | Status: SHIPPED | OUTPATIENT
Start: 2024-06-23

## 2024-06-23 NOTE — TELEPHONE ENCOUNTER
No care due was identified.  [facility] Embedded Care Due Messages. Reference number: [id number].   6/23/2024 7:55:00 AM CDT

## 2024-06-23 NOTE — TELEPHONE ENCOUNTER
Refill Decision Note   Cedrick Saunders  is requesting a refill authorization.  Brief Assessment and Rationale for Refill:  Approve     Medication Therapy Plan:  Allergies last reviewed on: 6/11/24 at 1344 by Alex Mckoy MD      Alert overridden per protocol: Yes   Comments:     Note composed:10:44 AM 06/23/2024             Appointments     Last Visit   5/29/2024 Alex Mckoy MD   Next Visit   Visit date not found Alex Mckoy MD

## 2024-06-24 ENCOUNTER — TELEPHONE (OUTPATIENT)
Dept: DIABETES | Facility: CLINIC | Age: 59
End: 2024-06-24
Payer: COMMERCIAL

## 2024-06-25 ENCOUNTER — OFFICE VISIT (OUTPATIENT)
Dept: DIABETES | Facility: CLINIC | Age: 59
End: 2024-06-25
Payer: COMMERCIAL

## 2024-06-25 VITALS
DIASTOLIC BLOOD PRESSURE: 70 MMHG | SYSTOLIC BLOOD PRESSURE: 108 MMHG | WEIGHT: 247 LBS | OXYGEN SATURATION: 97 % | HEIGHT: 71 IN | BODY MASS INDEX: 34.58 KG/M2 | HEART RATE: 66 BPM

## 2024-06-25 DIAGNOSIS — Z79.4 TYPE 2 DIABETES MELLITUS WITH HYPERGLYCEMIA, WITH LONG-TERM CURRENT USE OF INSULIN: Primary | Chronic | ICD-10-CM

## 2024-06-25 DIAGNOSIS — E78.2 DM TYPE 2 WITH DIABETIC MIXED HYPERLIPIDEMIA: ICD-10-CM

## 2024-06-25 DIAGNOSIS — E66.09 CLASS 1 OBESITY DUE TO EXCESS CALORIES WITH SERIOUS COMORBIDITY AND BODY MASS INDEX (BMI) OF 34.0 TO 34.9 IN ADULT: ICD-10-CM

## 2024-06-25 DIAGNOSIS — Z71.9 HEALTH EDUCATION/COUNSELING: ICD-10-CM

## 2024-06-25 DIAGNOSIS — E11.65 TYPE 2 DIABETES MELLITUS WITH HYPERGLYCEMIA, WITH LONG-TERM CURRENT USE OF INSULIN: Primary | Chronic | ICD-10-CM

## 2024-06-25 DIAGNOSIS — Z96.41 INSULIN PUMP IN PLACE: ICD-10-CM

## 2024-06-25 DIAGNOSIS — E78.2 MIXED HYPERLIPIDEMIA: ICD-10-CM

## 2024-06-25 DIAGNOSIS — Z46.81 INSULIN PUMP FITTING OR ADJUSTMENT: ICD-10-CM

## 2024-06-25 DIAGNOSIS — E11.69 DM TYPE 2 WITH DIABETIC MIXED HYPERLIPIDEMIA: ICD-10-CM

## 2024-06-25 DIAGNOSIS — I10 ESSENTIAL HYPERTENSION: ICD-10-CM

## 2024-06-25 PROCEDURE — 1159F MED LIST DOCD IN RCRD: CPT | Mod: CPTII,S$GLB,, | Performed by: NURSE PRACTITIONER

## 2024-06-25 PROCEDURE — 95251 CONT GLUC MNTR ANALYSIS I&R: CPT | Mod: S$GLB,,, | Performed by: NURSE PRACTITIONER

## 2024-06-25 PROCEDURE — 4010F ACE/ARB THERAPY RXD/TAKEN: CPT | Mod: CPTII,S$GLB,, | Performed by: NURSE PRACTITIONER

## 2024-06-25 PROCEDURE — 3061F NEG MICROALBUMINURIA REV: CPT | Mod: CPTII,S$GLB,, | Performed by: NURSE PRACTITIONER

## 2024-06-25 PROCEDURE — 3008F BODY MASS INDEX DOCD: CPT | Mod: CPTII,S$GLB,, | Performed by: NURSE PRACTITIONER

## 2024-06-25 PROCEDURE — 1160F RVW MEDS BY RX/DR IN RCRD: CPT | Mod: CPTII,S$GLB,, | Performed by: NURSE PRACTITIONER

## 2024-06-25 PROCEDURE — 3066F NEPHROPATHY DOC TX: CPT | Mod: CPTII,S$GLB,, | Performed by: NURSE PRACTITIONER

## 2024-06-25 PROCEDURE — 3078F DIAST BP <80 MM HG: CPT | Mod: CPTII,S$GLB,, | Performed by: NURSE PRACTITIONER

## 2024-06-25 PROCEDURE — 99214 OFFICE O/P EST MOD 30 MIN: CPT | Mod: S$GLB,,, | Performed by: NURSE PRACTITIONER

## 2024-06-25 PROCEDURE — 3051F HG A1C>EQUAL 7.0%<8.0%: CPT | Mod: CPTII,S$GLB,, | Performed by: NURSE PRACTITIONER

## 2024-06-25 PROCEDURE — 3074F SYST BP LT 130 MM HG: CPT | Mod: CPTII,S$GLB,, | Performed by: NURSE PRACTITIONER

## 2024-06-25 PROCEDURE — 99999 PR PBB SHADOW E&M-EST. PATIENT-LVL III: CPT | Mod: PBBFAC,,, | Performed by: NURSE PRACTITIONER

## 2024-06-25 RX ORDER — TIRZEPATIDE 12.5 MG/.5ML
12.5 INJECTION, SOLUTION SUBCUTANEOUS
Qty: 4 PEN | Refills: 11 | Status: SHIPPED | OUTPATIENT
Start: 2024-06-25

## 2024-06-25 RX ORDER — METFORMIN HYDROCHLORIDE 1000 MG/1
1000 TABLET ORAL 2 TIMES DAILY WITH MEALS
Qty: 180 TABLET | Refills: 2 | Status: SHIPPED | OUTPATIENT
Start: 2024-06-25

## 2024-06-25 RX ORDER — BLOOD-GLUCOSE SENSOR
EACH MISCELLANEOUS
Qty: 3 EACH | Refills: 11 | Status: SHIPPED | OUTPATIENT
Start: 2024-06-25

## 2024-06-25 RX ORDER — BLOOD-GLUCOSE TRANSMITTER
EACH MISCELLANEOUS
Qty: 1 EACH | Refills: 4 | Status: SHIPPED | OUTPATIENT
Start: 2024-06-25

## 2024-06-25 RX ORDER — INSULIN LISPRO 100 [IU]/ML
INJECTION, SOLUTION INTRAVENOUS; SUBCUTANEOUS
Qty: 30 ML | Refills: 6 | Status: SHIPPED | OUTPATIENT
Start: 2024-06-25

## 2024-06-25 NOTE — PROGRESS NOTES
CC:   Chief Complaint   Patient presents with    Diabetes Mellitus       HPI: Cedrick Saunders is a 58 y.o. male presents for a follow up visit today for the management of T2DM.     He was diagnosed with Type 2 diabetes at age 37 with s/s of polyuria, nocturia, and polydipsia and BG was 600. A1c at diagnosis was > 12%. He was initially on oral medications Actos and Metformin. Insulin therapy was started in 2016.     Family hx of diabetes: father, paternal grandmother   Hospitalized for diabetes: denies     No personal or FH of thyroid cancer or personal of pancreatic cancer or pancreatitis.       Our last visit was in March 2024   Off Mounjaro for 3 weeks due to back order issues   Adjusted his set doses with meals and tighten his ISF   Still needs to work on diet   Over the last couple weeks his blood sugars have been above goal on his Dexcom download.  He attributes this due to being off of the Mounjaro for 3 weeks  He reports that yesterday he had a chocolate snow ball and his sugars went above 400s  He also still is working on pump optimization  Sometimes bolusing after he eats instead of before.  Sometimes entering in false carbs to lower his sugar instead of just giving correction  Reports that he is doing okay on the Jardiance and denies any side effects  Were able to get the 12.5 mg weekly dose of Mounjaro from [facility]        DIABETES COMPLICATIONS: none      Diabetes Management Status    ASA: not taking     Statin: On Crestor 5 mg nightly-- stopped Lipitor due to arthralgia -- and joint pain resolved with stopping the Lipitor.   ACE/ARB: Taking- Lisinopril 20 mg     The 10-year ASCVD risk score (Leonie ZIEGLER, et al., 2019) is: 12.1%    Values used to calculate the score:      Age: 58 years      Sex: Male      Is Non- : No      Diabetic: Yes      Tobacco smoker: No      Systolic Blood Pressure: 108 mmHg      Is BP treated: Yes      HDL Cholesterol: 37 mg/dL       Total Cholesterol: 134 mg/dL      Screening or Prevention Patient's value Goal Complete/Controlled?   HgA1C Testing and Control   Lab Results   Component Value Date    HGBA1C 7.0 (H) 06/21/2024      Annually/Less than 8% No   Lipid profile : 06/18/2024 Annually Yes   LDL control Lab Results   Component Value Date    LDLCALC 72.0 06/18/2024    Annually/Less than 100 mg/dl  Yes   Nephropathy screening Lab Results   Component Value Date    LABMICR 10.0 03/04/2024     Lab Results   Component Value Date    PROTEINUA Trace (A) 05/19/2006    Annually Yes   Blood pressure BP Readings from Last 1 Encounters:   06/25/24 108/70    Less than 140/90 Yes   Dilated retinal exam : 08/17/2023- Annually No   Foot exam   : 03/07/2024 Annually Yes       CURRENT A1C:    Hemoglobin A1C   Date Value Ref Range Status   06/21/2024 7.0 (H) 4.0 - 5.6 % Final     Comment:     ADA Screening Guidelines:  5.7-6.4%  Consistent with prediabetes  >or=6.5%  Consistent with diabetes    High levels of fetal hemoglobin interfere with the HbA1C  assay. Heterozygous hemoglobin variants (HbS, HgC, etc)do  not significantly interfere with this assay.   However, presence of multiple variants may affect accuracy.     03/01/2024 7.4 (H) 4.0 - 5.6 % Final     Comment:     ADA Screening Guidelines:  5.7-6.4%  Consistent with prediabetes  >or=6.5%  Consistent with diabetes    High levels of fetal hemoglobin interfere with the HbA1C  assay. Heterozygous hemoglobin variants (HbS, HgC, etc)do  not significantly interfere with this assay.   However, presence of multiple variants may affect accuracy.     11/15/2023 7.3 (H) 4.0 - 5.6 % Final     Comment:     ADA Screening Guidelines:  5.7-6.4%  Consistent with prediabetes  >or=6.5%  Consistent with diabetes    High levels of fetal hemoglobin interfere with the HbA1C  assay. Heterozygous hemoglobin variants (HbS, HgC, etc)do  not significantly interfere with this assay.   However, presence of multiple variants may  affect accuracy.         GOAL A1C: 7% or less without hypoglycemia.       DM MEDICATIONS USED IN THE PAST: Metformin, Januvia, Basaglar, Farxiga, Glyburide, Actos   Invokana- yeast infections, Tresiba, Ozempic   VGo  Novolog   Dexcom -- off due to cost   Fiasp --  Jardiance - yeast infections   Humalog   Omnipod 5       CURRENT DIABETES MEDICATIONS: Metformin 1000 mg BID,   Jardiance 25 mg -- off the Farxiga -- taking the jardiance right now   Off the Mounjaro 15 mg for 3 weeks   Omnipod 5    [place]- 8AM: 1.65 units/hr --  8AM- 12AM ([place])- 1.95 units /hr -  ICR:1:10-- set doses with meals -- 10-12 units --100 gram or 120 grams -- increase prandial insulin administration   ISF: 1:30-- tighten   Target: 120  IOB: 3.5    Pump site change: q 2-3 days   Cartridge change: q 2-3 days  Insulin TDD: 72.5 units    Basal 52%   Bolus  48%        Back up Lantus/Levemir: none-- will put back up tresiba at pharmacy     Patient's pump was downloaded in clinic today and reviewed with patient.   Please see attached documents for pump download.           BLOOD GLUCOSE MONITORING:    Sensor type: Dexcom G6   Average BG readin  Time in range: 50%   32% high   17% very high   0% low   <1% very low   Site change:  q10 days  Estimated A1c-- N/A     2 weeks prior -- BG was 169 on average -- estimated A1c 7.4%     Supplies with pharmacy     Sensor was downloaded in clinic today and reviewed with patient.   Please see attached document for download.         HYPOGLYCEMIA:  rarely    Carries crackers / juice       MEALS: eating 3 meals per day   + dietary indiscretions-- nadia in the evening   Diet green tea or water.   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   HENT:  Negative for trouble swallowing.    Eyes:  Negative for visual disturbance.   Respiratory:  Negative for  shortness of breath.    Cardiovascular:  Negative for chest pain.   Gastrointestinal:  Negative for nausea.   Endocrine: Negative for polydipsia, polyphagia and polyuria.   Genitourinary:         No Nocturia    Musculoskeletal:  Positive for arthralgias (left shoulder), back pain and neck pain.   Skin:  Negative for rash and wound.   Neurological:  Negative for numbness.   Psychiatric/Behavioral:  Negative for dysphoric mood.        Physical Exam   Physical Exam  Vitals and nursing note reviewed.   Constitutional:       Appearance: He is well-developed. He is obese.   HENT:      Head: Normocephalic and atraumatic.      Right Ear: External ear normal.      Left Ear: External ear normal.      Nose: Nose normal.   Neck:      Thyroid: No thyromegaly.      Trachea: No tracheal deviation.   Cardiovascular:      Rate and Rhythm: Normal rate and regular rhythm.      Heart sounds: No murmur heard.  Pulmonary:      Effort: Pulmonary effort is normal. No respiratory distress.      Breath sounds: Normal breath sounds.   Abdominal:      Palpations: Abdomen is soft.      Tenderness: There is no abdominal tenderness.      Hernia: No hernia is present.   Musculoskeletal:      Cervical back: Normal range of motion and neck supple.   Skin:     General: Skin is warm and dry.      Capillary Refill: Capillary refill takes less than 2 seconds.      Findings: No rash.      Comments: Omnipod sites and dexcom sites with no complications.  No lipo hypertropthy or atrophy   Neurological:      Mental Status: He is alert and oriented to person, place, and time.      Cranial Nerves: No cranial nerve deficit.   Psychiatric:         Mood and Affect: Mood is not depressed. Affect is not tearful.         Behavior: Behavior normal.         Judgment: Judgment normal.           FOOT EXAMINATION:  Appropriate footwear           Lab Results   Component Value Date    TSH 2.458 03/01/2024             Type 2 diabetes mellitus with hyperglycemia, with  long-term current use of insulin  A1c is trending down  Would like to get closer to 6.5 if possible but down to 7% which is reasonable  Dexcom readings above goal --running higher the last couple of weeks due to being off of Mounjaro for 3 weeks  + prandial excursions r/t dietary indiscretions and/ or missed doses/ bolus timing off of bolus doses   Cost is an issue with diabetes management.   We had a lot of insurance issues at the beginning of this year- causing some noncompliance with medications   No longer covered for Fiasp or Humalog   We had to appeal for the Mounjaro -  He needs to reach out to [employer] to see if he can get the Farxiga for free   However he is tolerating the Jardiance without any side effects      Long discussion with patient about proper Omnipod 5 use and pump optimization  Asked that he use the carbohydrate calculator to calculate his bolus doses and entering his blood sugar readings every time he is eating.  We did discuss that he is not actually going to carbohydrate count but that he is going to enter in doses-- so 100 grams of CHO for 10 units and etc         Medication changes:       Continue Metformin 1000 mg 2 times per day     We cut back on Mounjaro to 12.5 mg weekly because that is what is available at this time.  I sent the prescription over to [facility]   We discussed that when the 15 mg weekly doses available he can restart that       Continue either Jardiance 25 mg daily -- he prefers the Farxiga. He will reach out to the company to see if he can get the Farxiga for free since his insurance denied our appeal  We discussed that if he stays on the Jardiance that he needs to make sure he is drinking lots of water and that he bring a change of clothes and change MCC through the day to prevent a moist environment for yeast infections to occur      Insulin Pump:Omnipod 5 with Humalog ( on Humalog per his insurance)   Pump settings:  Basal:   [place]- 8AM: 1.65 units/hr  --  8AM- 12AM ([place])- 1.95 units /hr -  ICR:1:10-- set doses with meals -- 10-12 units --100 gram or 120 grams -- increase prandial insulin administration   ISF: 1:25-- tightened  Target: 120  IOB: 3--tightened        -- Reviewed goals of therapy are to get the best control we can without hypoglycemia  -- Reviewed patient's current insulin regimen. Clarified proper insulin dose and timing in relation to meals, etc. Insulin injection sites and proper rotation instructed.    -- Advised frequent self blood glucose monitoring.  Patient encouraged to document glucose results and bring them to every clinic visit -   Continue Dexcom G6 - supplies from pharmacy   -- Hypoglycemia precautions discussed. Instructed on precautions before driving.    -- Call for Bg repeatedly < 70 or > 180.   -- Close adherence to lifestyle changes recommended.   -- Periodic follow ups for eye evaluations, foot care and dental care suggested.          Patient has diabetes mellitus and manages diabetes with intensive insulin regimen and uses prandial and basal insulin daily-- via omnipod 5   Patient requires a therapeutic CGM and is willing to use therapeutic CGM for the necessary frequent adjustments of insulin therapy.  I have completed an in-person visit during the previous 6 months and will continue to have in-person visits every 6 months to assess adherence to their CGM regimen and diabetes treatment plan.  Due to COVID pandemic and need for remote monitoring this tool is medically necessary         Essential hypertension  BP goal is < 140/90.   Tolerating ACEi  Controlled   Blood pressure goals discussed with patient      DM type 2 with diabetic mixed hyperlipidemia  See above     Mixed hyperlipidemia  On statin per ADA recommendations  LDL goal < 100.  LDL at goal.  LFTs within normal limits Continue statin.    Class 1 obesity due to excess calories with serious comorbidity and body mass index (BMI) of 34.0 to 34.9 in adult  Body mass index  is 34.45 kg/m².  Increases insulin resistance.   Discussed DM diet and exercise.       Insulin pump fitting or adjustment  See above    Insulin pump in place  See above           I spent a total of 30 minutes on the day of the visit.This includes face to face time and non-face to face time preparing to see the patient (eg, review of tests), obtaining and/or reviewing separately obtained history, documenting clinical information in the electronic or other health record, independently interpreting results and communicating results to the patient/family/caregiver, or care coordinator.          Follow up in about 4 months (around 10/25/2024).  Follow up with me in 4 months with labs prior         Orders Placed This Encounter   Procedures    Hemoglobin A1C     Standing Status:   Future     Standing Expiration Date:   12/25/2025    Basic Metabolic Panel     Standing Status:   Future     Standing Expiration Date:   12/25/2025         Recommendations were discussed with the patient in detail  The patient verbalized understanding and agrees with the plan outlined as above.

## 2024-06-25 NOTE — ASSESSMENT & PLAN NOTE
A1c is trending down  Would like to get closer to 6.5 if possible but down to 7% which is reasonable  Dexcom readings above goal --running higher the last couple of weeks due to being off of Mounjaro for 3 weeks  + prandial excursions r/t dietary indiscretions and/ or missed doses/ bolus timing off of bolus doses   Cost is an issue with diabetes management.   We had a lot of insurance issues at the beginning of this year- causing some noncompliance with medications   No longer covered for Fiasp or Humalog   We had to appeal for the Mounjaro -  He needs to reach out to [employer] to see if he can get the Farxiga for free   However he is tolerating the Jardiance without any side effects      Long discussion with patient about proper Omnipod 5 use and pump optimization  Asked that he use the carbohydrate calculator to calculate his bolus doses and entering his blood sugar readings every time he is eating.  We did discuss that he is not actually going to carbohydrate count but that he is going to enter in doses-- so 100 grams of CHO for 10 units and etc         Medication changes:       Continue Metformin 1000 mg 2 times per day     We cut back on Mounjaro to 12.5 mg weekly because that is what is available at this time.  I sent the prescription over to [facility]   We discussed that when the 15 mg weekly doses available he can restart that       Continue either Jardiance 25 mg daily -- he prefers the Farxiga. He will reach out to the company to see if he can get the Farxiga for free since his insurance denied our appeal  We discussed that if he stays on the Jardiance that he needs to make sure he is drinking lots of water and that he bring a change of clothes and change alf through the day to prevent a moist environment for yeast infections to occur      Insulin Pump:Omnipod 5 with Humalog ( on Humalog per his insurance)   Pump settings:  Basal:   [place]- 8AM: 1.65 units/hr --  8AM- 12AM ([place])- 1.95 units /hr  -  ICR:1:10-- set doses with meals -- 10-12 units --100 gram or 120 grams -- increase prandial insulin administration   ISF: 1:25-- tightened  Target: 120  IOB: 3--tightened        -- Reviewed goals of therapy are to get the best control we can without hypoglycemia  -- Reviewed patient's current insulin regimen. Clarified proper insulin dose and timing in relation to meals, etc. Insulin injection sites and proper rotation instructed.    -- Advised frequent self blood glucose monitoring.  Patient encouraged to document glucose results and bring them to every clinic visit -   Continue Dexcom G6 - supplies from pharmacy   -- Hypoglycemia precautions discussed. Instructed on precautions before driving.    -- Call for Bg repeatedly < 70 or > 180.   -- Close adherence to lifestyle changes recommended.   -- Periodic follow ups for eye evaluations, foot care and dental care suggested.          Patient has diabetes mellitus and manages diabetes with intensive insulin regimen and uses prandial and basal insulin daily-- via omnipod 5   Patient requires a therapeutic CGM and is willing to use therapeutic CGM for the necessary frequent adjustments of insulin therapy.  I have completed an in-person visit during the previous 6 months and will continue to have in-person visits every 6 months to assess adherence to their CGM regimen and diabetes treatment plan.  Due to COVID pandemic and need for remote monitoring this tool is medically necessary

## 2024-06-25 NOTE — ASSESSMENT & PLAN NOTE
On statin per ADA recommendations  LDL goal < 100.  LDL at goal.  LFTs within normal limits Continue statin.

## 2024-06-25 NOTE — ASSESSMENT & PLAN NOTE
Body mass index is 34.45 kg/m².  Increases insulin resistance.   Discussed DM diet and exercise.

## 2024-07-03 RX ORDER — LISINOPRIL AND HYDROCHLOROTHIAZIDE 12.5; 2 MG/1; MG/1
1 TABLET ORAL DAILY
Qty: 90 TABLET | Refills: 3 | Status: SHIPPED | OUTPATIENT
Start: 2024-07-03

## 2024-07-03 NOTE — TELEPHONE ENCOUNTER
No care due was identified.  [facility] Embedded Care Due Messages. Reference number: [id number].   7/03/2024 9:15:54 AM CDT

## 2024-07-04 NOTE — TELEPHONE ENCOUNTER
Refill Decision Note   Cedrick Saunders  is requesting a refill authorization.  Brief Assessment and Rationale for Refill:  Approve     Medication Therapy Plan:         Comments:     Note composed:8:30 PM 07/03/2024

## 2024-07-15 DIAGNOSIS — Z79.4 TYPE 2 DIABETES MELLITUS WITH HYPERGLYCEMIA, WITH LONG-TERM CURRENT USE OF INSULIN: Chronic | ICD-10-CM

## 2024-07-15 DIAGNOSIS — E11.65 TYPE 2 DIABETES MELLITUS WITH HYPERGLYCEMIA, WITH LONG-TERM CURRENT USE OF INSULIN: Chronic | ICD-10-CM

## 2024-07-15 RX ORDER — INSULIN PMP CART,AUT,G6/7,CNTR
1 EACH SUBCUTANEOUS
Qty: 3 EACH | Refills: 11 | Status: SHIPPED | OUTPATIENT
Start: 2024-07-15

## 2024-08-21 DIAGNOSIS — E11.9 TYPE 2 DIABETES MELLITUS WITHOUT COMPLICATION, UNSPECIFIED WHETHER LONG TERM INSULIN USE: ICD-10-CM

## 2024-08-28 ENCOUNTER — PATIENT MESSAGE (OUTPATIENT)
Dept: DIABETES | Facility: CLINIC | Age: 59
End: 2024-08-28
Payer: COMMERCIAL

## 2024-09-12 RX ORDER — ESCITALOPRAM OXALATE 20 MG/1
20 TABLET ORAL DAILY
Qty: 90 TABLET | Refills: 2 | Status: SHIPPED | OUTPATIENT
Start: 2024-09-12

## 2024-09-12 NOTE — TELEPHONE ENCOUNTER
Cedrick Saunders  is requesting a refill authorization.  Brief Assessment and Rationale for Refill:  Approve     Medication Therapy Plan:         Comments:     Note composed:1:41 PM 09/12/2024

## 2024-09-12 NOTE — TELEPHONE ENCOUNTER
No care due was identified.  [facility] Embedded Care Due Messages. Reference number: [id number].   9/12/2024 10:59:25 AM CDT

## 2024-09-19 ENCOUNTER — PATIENT MESSAGE (OUTPATIENT)
Dept: PRIMARY CARE CLINIC | Facility: CLINIC | Age: 59
End: 2024-09-19
Payer: COMMERCIAL

## 2024-09-25 ENCOUNTER — PATIENT MESSAGE (OUTPATIENT)
Dept: DIABETES | Facility: CLINIC | Age: 59
End: 2024-09-25
Payer: COMMERCIAL

## 2024-10-15 ENCOUNTER — PATIENT MESSAGE (OUTPATIENT)
Dept: DIABETES | Facility: CLINIC | Age: 59
End: 2024-10-15
Payer: COMMERCIAL

## 2024-10-21 ENCOUNTER — TELEPHONE (OUTPATIENT)
Dept: PRIMARY CARE CLINIC | Facility: CLINIC | Age: 59
End: 2024-10-21
Payer: COMMERCIAL

## 2024-10-21 NOTE — TELEPHONE ENCOUNTER
----- Message from Mark sent at 10/21/2024  4:07 PM CDT -----  Regarding: Patient Callback  Contact: Pt [phone]  [email or web address]     Patient is calling for Medical Advice regarding: Patient called to make sure his apt was done correctly. He said someone called and left a message saying he did it wrong? Please call back asap to discuss with patient.    How long has patient had these symptoms:    Pharmacy name and phone#:    Patient wants a call back or thru [facility]: Call    Comments:    Please advise patient replies from provider may take up to 48 hours.

## 2024-10-22 ENCOUNTER — PATIENT OUTREACH (OUTPATIENT)
Dept: ADMINISTRATIVE | Facility: HOSPITAL | Age: 59
End: 2024-10-22
Payer: COMMERCIAL

## 2024-10-22 NOTE — LETTER
AUTHORIZATION FOR RELEASE OF   CONFIDENTIAL INFORMATION    Dear Dr. George,    We are seeing Cedrick Saunders, date of birth 1965, in the clinic at [facility]. Alex Mckoy MD is the patient's PCP. Cedrick Saunders has an outstanding lab/procedure at the time we reviewed his chart. In order to help keep his health information updated, he has authorized us to request the following medical record(s):        (  )  MAMMOGRAM                                      (  )  COLONOSCOPY      (  )  PAP SMEAR                                          (  )  OUTSIDE LAB RESULTS     (  )  DEXA SCAN                                          (X)  EYE EXAM            (  )  FOOT EXAM                                          (  )  ENTIRE RECORD     (  )  OUTSIDE IMMUNIZATIONS                 (  )  _______________       ATTN: STEVE      Please fax records to Alex Mckoy MD, [phone]     If you have any questions, please contact Steve at [phone].           Patient Name: Cedrick Saunders  : 1965  Patient Phone #: [phone]

## 2024-10-22 NOTE — PROGRESS NOTES
Health Maintenance Due   Topic Date Due    Shingles Vaccine (1 of 2) Never done    RSV Vaccine (Age 60+ and Pregnant patients) (1 - Risk 60-74 years 1-dose series) Never done    Eye Exam  08/17/2024    Influenza Vaccine (1) 09/01/2024    COVID-19 Vaccine (4 - 2024-25 season) 09/01/2024     Immunizations - reviewed and updated   Care Everywhere - triggered   Care Teams - updated   Outreach - Pre visit chart review done. Patient has an appointment with Dorota Travis NP on 10/24/2024  Diabetic eye exam due. TIFFANIE sent to Dr. George for most recent eye records

## 2024-10-24 ENCOUNTER — OFFICE VISIT (OUTPATIENT)
Dept: PRIMARY CARE CLINIC | Facility: CLINIC | Age: 59
End: 2024-10-24
Payer: COMMERCIAL

## 2024-10-24 VITALS
RESPIRATION RATE: 14 BRPM | SYSTOLIC BLOOD PRESSURE: 110 MMHG | OXYGEN SATURATION: 96 % | WEIGHT: 228.63 LBS | HEIGHT: 71 IN | TEMPERATURE: 97 F | DIASTOLIC BLOOD PRESSURE: 74 MMHG | HEART RATE: 77 BPM | BODY MASS INDEX: 32.01 KG/M2

## 2024-10-24 DIAGNOSIS — E78.2 DM TYPE 2 WITH DIABETIC MIXED HYPERLIPIDEMIA: ICD-10-CM

## 2024-10-24 DIAGNOSIS — E11.65 TYPE 2 DIABETES MELLITUS WITH HYPERGLYCEMIA, WITH LONG-TERM CURRENT USE OF INSULIN: Chronic | ICD-10-CM

## 2024-10-24 DIAGNOSIS — E11.69 DM TYPE 2 WITH DIABETIC MIXED HYPERLIPIDEMIA: ICD-10-CM

## 2024-10-24 DIAGNOSIS — M65.4 DE QUERVAIN'S TENOSYNOVITIS: Primary | ICD-10-CM

## 2024-10-24 DIAGNOSIS — Z96.41 INSULIN PUMP IN PLACE: ICD-10-CM

## 2024-10-24 DIAGNOSIS — Z79.4 TYPE 2 DIABETES MELLITUS WITH HYPERGLYCEMIA, WITH LONG-TERM CURRENT USE OF INSULIN: Chronic | ICD-10-CM

## 2024-10-24 DIAGNOSIS — I10 ESSENTIAL HYPERTENSION: ICD-10-CM

## 2024-10-24 PROCEDURE — 1159F MED LIST DOCD IN RCRD: CPT | Mod: CPTII,S$GLB,, | Performed by: NURSE PRACTITIONER

## 2024-10-24 PROCEDURE — 3061F NEG MICROALBUMINURIA REV: CPT | Mod: CPTII,S$GLB,, | Performed by: NURSE PRACTITIONER

## 2024-10-24 PROCEDURE — 4010F ACE/ARB THERAPY RXD/TAKEN: CPT | Mod: CPTII,S$GLB,, | Performed by: NURSE PRACTITIONER

## 2024-10-24 PROCEDURE — 3008F BODY MASS INDEX DOCD: CPT | Mod: CPTII,S$GLB,, | Performed by: NURSE PRACTITIONER

## 2024-10-24 PROCEDURE — 3051F HG A1C>EQUAL 7.0%<8.0%: CPT | Mod: CPTII,S$GLB,, | Performed by: NURSE PRACTITIONER

## 2024-10-24 PROCEDURE — 3066F NEPHROPATHY DOC TX: CPT | Mod: CPTII,S$GLB,, | Performed by: NURSE PRACTITIONER

## 2024-10-24 PROCEDURE — 99999 PR PBB SHADOW E&M-EST. PATIENT-LVL V: CPT | Mod: PBBFAC,,, | Performed by: NURSE PRACTITIONER

## 2024-10-24 PROCEDURE — 3078F DIAST BP <80 MM HG: CPT | Mod: CPTII,S$GLB,, | Performed by: NURSE PRACTITIONER

## 2024-10-24 PROCEDURE — 3074F SYST BP LT 130 MM HG: CPT | Mod: CPTII,S$GLB,, | Performed by: NURSE PRACTITIONER

## 2024-10-24 PROCEDURE — 99214 OFFICE O/P EST MOD 30 MIN: CPT | Mod: S$GLB,,, | Performed by: NURSE PRACTITIONER

## 2024-10-24 RX ORDER — DICLOFENAC SODIUM 75 MG/1
75 TABLET, DELAYED RELEASE ORAL 2 TIMES DAILY
Qty: 14 TABLET | Refills: 0 | Status: SHIPPED | OUTPATIENT
Start: 2024-10-24
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
## 2024-10-25 ENCOUNTER — TELEPHONE (OUTPATIENT)
Dept: PRIMARY CARE CLINIC | Facility: CLINIC | Age: 59
End: 2024-10-25
Payer: COMMERCIAL

## 2024-10-25 NOTE — TELEPHONE ENCOUNTER
----- Message from Chloe sent at 10/25/2024 11:40 AM CDT -----  Patient ask for call back with his x rat results

## 2024-10-28 ENCOUNTER — PATIENT MESSAGE (OUTPATIENT)
Dept: PRIMARY CARE CLINIC | Facility: CLINIC | Age: 59
End: 2024-10-28
Payer: COMMERCIAL

## 2024-10-29 ENCOUNTER — TELEPHONE (OUTPATIENT)
Dept: DIABETES | Facility: CLINIC | Age: 59
End: 2024-10-29

## 2024-10-29 ENCOUNTER — PATIENT MESSAGE (OUTPATIENT)
Dept: DIABETES | Facility: CLINIC | Age: 59
End: 2024-10-29

## 2024-10-29 ENCOUNTER — OFFICE VISIT (OUTPATIENT)
Dept: DIABETES | Facility: CLINIC | Age: 59
End: 2024-10-29
Payer: COMMERCIAL

## 2024-10-29 VITALS
DIASTOLIC BLOOD PRESSURE: 72 MMHG | BODY MASS INDEX: 31.94 KG/M2 | WEIGHT: 229 LBS | OXYGEN SATURATION: 98 % | HEART RATE: 68 BPM | SYSTOLIC BLOOD PRESSURE: 118 MMHG

## 2024-10-29 DIAGNOSIS — E11.65 TYPE 2 DIABETES MELLITUS WITH HYPERGLYCEMIA, WITH LONG-TERM CURRENT USE OF INSULIN: Primary | Chronic | ICD-10-CM

## 2024-10-29 DIAGNOSIS — Z79.4 TYPE 2 DIABETES MELLITUS WITH HYPERGLYCEMIA, WITH LONG-TERM CURRENT USE OF INSULIN: Primary | Chronic | ICD-10-CM

## 2024-10-29 DIAGNOSIS — E66.811 CLASS 1 OBESITY DUE TO EXCESS CALORIES WITH SERIOUS COMORBIDITY AND BODY MASS INDEX (BMI) OF 31.0 TO 31.9 IN ADULT: ICD-10-CM

## 2024-10-29 DIAGNOSIS — Z46.81 INSULIN PUMP FITTING OR ADJUSTMENT: ICD-10-CM

## 2024-10-29 DIAGNOSIS — E78.2 DM TYPE 2 WITH DIABETIC MIXED HYPERLIPIDEMIA: ICD-10-CM

## 2024-10-29 DIAGNOSIS — I10 ESSENTIAL HYPERTENSION: ICD-10-CM

## 2024-10-29 DIAGNOSIS — E11.69 DM TYPE 2 WITH DIABETIC MIXED HYPERLIPIDEMIA: ICD-10-CM

## 2024-10-29 DIAGNOSIS — E66.09 CLASS 1 OBESITY DUE TO EXCESS CALORIES WITH SERIOUS COMORBIDITY AND BODY MASS INDEX (BMI) OF 31.0 TO 31.9 IN ADULT: ICD-10-CM

## 2024-10-29 DIAGNOSIS — E78.2 MIXED HYPERLIPIDEMIA: ICD-10-CM

## 2024-10-29 DIAGNOSIS — Z71.9 HEALTH EDUCATION/COUNSELING: ICD-10-CM

## 2024-10-29 DIAGNOSIS — Z96.41 INSULIN PUMP IN PLACE: ICD-10-CM

## 2024-10-29 PROCEDURE — 3044F HG A1C LEVEL LT 7.0%: CPT | Mod: CPTII,S$GLB,, | Performed by: NURSE PRACTITIONER

## 2024-10-29 PROCEDURE — 99999 PR PBB SHADOW E&M-EST. PATIENT-LVL IV: CPT | Mod: PBBFAC,,, | Performed by: NURSE PRACTITIONER

## 2024-10-29 PROCEDURE — 95251 CONT GLUC MNTR ANALYSIS I&R: CPT | Mod: S$GLB,,, | Performed by: NURSE PRACTITIONER

## 2024-10-29 PROCEDURE — 4010F ACE/ARB THERAPY RXD/TAKEN: CPT | Mod: CPTII,S$GLB,, | Performed by: NURSE PRACTITIONER

## 2024-10-29 PROCEDURE — 3061F NEG MICROALBUMINURIA REV: CPT | Mod: CPTII,S$GLB,, | Performed by: NURSE PRACTITIONER

## 2024-10-29 PROCEDURE — 3066F NEPHROPATHY DOC TX: CPT | Mod: CPTII,S$GLB,, | Performed by: NURSE PRACTITIONER

## 2024-10-29 PROCEDURE — 3078F DIAST BP <80 MM HG: CPT | Mod: CPTII,S$GLB,, | Performed by: NURSE PRACTITIONER

## 2024-10-29 PROCEDURE — 1159F MED LIST DOCD IN RCRD: CPT | Mod: CPTII,S$GLB,, | Performed by: NURSE PRACTITIONER

## 2024-10-29 PROCEDURE — 3008F BODY MASS INDEX DOCD: CPT | Mod: CPTII,S$GLB,, | Performed by: NURSE PRACTITIONER

## 2024-10-29 PROCEDURE — 3074F SYST BP LT 130 MM HG: CPT | Mod: CPTII,S$GLB,, | Performed by: NURSE PRACTITIONER

## 2024-10-29 PROCEDURE — 1160F RVW MEDS BY RX/DR IN RCRD: CPT | Mod: CPTII,S$GLB,, | Performed by: NURSE PRACTITIONER

## 2024-10-29 PROCEDURE — 99214 OFFICE O/P EST MOD 30 MIN: CPT | Mod: S$GLB,,, | Performed by: NURSE PRACTITIONER

## 2024-10-29 RX ORDER — TIRZEPATIDE 15 MG/.5ML
15 INJECTION, SOLUTION SUBCUTANEOUS
Qty: 4 PEN | Refills: 6 | Status: SHIPPED | OUTPATIENT
Start: 2024-10-29

## 2024-10-29 RX ORDER — METFORMIN HYDROCHLORIDE 1000 MG/1
1000 TABLET ORAL 2 TIMES DAILY WITH MEALS
Qty: 180 TABLET | Refills: 2 | Status: SHIPPED | OUTPATIENT
Start: 2024-10-29

## 2024-10-29 RX ORDER — INSULIN LISPRO 100 [IU]/ML
INJECTION, SOLUTION INTRAVENOUS; SUBCUTANEOUS
Qty: 30 ML | Refills: 6 | Status: SHIPPED | OUTPATIENT
Start: 2024-10-29

## 2024-10-29 RX ORDER — BLOOD-GLUCOSE SENSOR
1 EACH MISCELLANEOUS
Qty: 3 EACH | Refills: 11 | Status: SHIPPED | OUTPATIENT
Start: 2024-10-29 | End: 2025-10-29

## 2024-10-29 RX ORDER — INSULIN PMP CART,AUT,G6/7,CNTR
1 EACH SUBCUTANEOUS
Qty: 3 EACH | Refills: 11 | Status: SHIPPED | OUTPATIENT
Start: 2024-10-29

## 2024-10-29 RX ORDER — ROSUVASTATIN CALCIUM 5 MG/1
5 TABLET, COATED ORAL NIGHTLY
Qty: 90 TABLET | Refills: 3 | Status: SHIPPED | OUTPATIENT
Start: 2024-10-29

## 2024-10-30 ENCOUNTER — OFFICE VISIT (OUTPATIENT)
Dept: ORTHOPEDICS | Facility: CLINIC | Age: 59
End: 2024-10-30
Payer: COMMERCIAL

## 2024-10-30 VITALS — BODY MASS INDEX: 32.1 KG/M2 | HEART RATE: 70 BPM | HEIGHT: 71 IN | WEIGHT: 229.25 LBS

## 2024-10-30 DIAGNOSIS — M65.4 DE QUERVAIN'S TENOSYNOVITIS: ICD-10-CM

## 2024-10-30 DIAGNOSIS — S66.902A INJURY OF LEFT SCAPHOLUNATE LIGAMENT WITH NO INSTABILITY, INITIAL ENCOUNTER: Primary | ICD-10-CM

## 2024-10-30 PROCEDURE — 99999 PR PBB SHADOW E&M-EST. PATIENT-LVL IV: CPT | Mod: PBBFAC,,,

## 2024-10-30 PROCEDURE — 99204 OFFICE O/P NEW MOD 45 MIN: CPT | Mod: S$GLB,,,

## 2024-10-30 PROCEDURE — 3066F NEPHROPATHY DOC TX: CPT | Mod: CPTII,S$GLB,,

## 2024-10-30 PROCEDURE — 1159F MED LIST DOCD IN RCRD: CPT | Mod: CPTII,S$GLB,,

## 2024-10-30 PROCEDURE — 3008F BODY MASS INDEX DOCD: CPT | Mod: CPTII,S$GLB,,

## 2024-10-30 PROCEDURE — 4010F ACE/ARB THERAPY RXD/TAKEN: CPT | Mod: CPTII,S$GLB,,

## 2024-10-30 PROCEDURE — 3061F NEG MICROALBUMINURIA REV: CPT | Mod: CPTII,S$GLB,,

## 2024-10-30 PROCEDURE — 3044F HG A1C LEVEL LT 7.0%: CPT | Mod: CPTII,S$GLB,,

## 2024-10-30 RX ORDER — DICLOFENAC SODIUM 75 MG/1
75 TABLET, DELAYED RELEASE ORAL 2 TIMES DAILY
Qty: 14 TABLET | Refills: 1 | Status: SHIPPED | OUTPATIENT
Start: 2024-10-30

## 2024-12-23 NOTE — TELEPHONE ENCOUNTER
One refill approved, but patient needs follow up appointment. Please call to schedule.  
Tried calling patient, no answer. Message left for him to return my call   
Statement Selected

## 2025-02-03 DIAGNOSIS — S66.902A INJURY OF LEFT SCAPHOLUNATE LIGAMENT WITH NO INSTABILITY, INITIAL ENCOUNTER: ICD-10-CM

## 2025-02-03 DIAGNOSIS — M65.4 DE QUERVAIN'S TENOSYNOVITIS: ICD-10-CM

## 2025-02-04 RX ORDER — DICLOFENAC SODIUM 75 MG/1
75 TABLET, DELAYED RELEASE ORAL 2 TIMES DAILY
Qty: 14 TABLET | Refills: 0 | Status: SHIPPED | OUTPATIENT
Start: 2025-02-04

## 2025-02-26 DIAGNOSIS — J01.90 ACUTE BACTERIAL SINUSITIS: Primary | ICD-10-CM

## 2025-02-26 DIAGNOSIS — B96.89 ACUTE BACTERIAL SINUSITIS: Primary | ICD-10-CM

## 2025-02-26 RX ORDER — AMOXICILLIN AND CLAVULANATE POTASSIUM 875; 125 MG/1; MG/1
1 TABLET, FILM COATED ORAL EVERY 12 HOURS
Qty: 14 TABLET | Refills: 0 | Status: SHIPPED | OUTPATIENT
Start: 2025-02-26 | End: 2025-03-05

## 2025-05-05 ENCOUNTER — CLINICAL SUPPORT (OUTPATIENT)
Dept: DIABETES | Facility: CLINIC | Age: 60
End: 2025-05-05
Payer: COMMERCIAL

## 2025-05-05 DIAGNOSIS — E11.65 TYPE 2 DIABETES MELLITUS WITH HYPERGLYCEMIA, WITH LONG-TERM CURRENT USE OF INSULIN: Primary | ICD-10-CM

## 2025-05-05 DIAGNOSIS — Z79.4 TYPE 2 DIABETES MELLITUS WITH HYPERGLYCEMIA, WITH LONG-TERM CURRENT USE OF INSULIN: Primary | ICD-10-CM

## 2025-05-05 PROCEDURE — 99999 PR PBB SHADOW E&M-EST. PATIENT-LVL II: CPT | Mod: PBBFAC,,, | Performed by: DIETITIAN, REGISTERED

## 2025-05-09 VITALS — HEIGHT: 71 IN | WEIGHT: 224.88 LBS | BODY MASS INDEX: 31.48 KG/M2

## 2025-05-09 NOTE — PROGRESS NOTES
"Diabetes Care Specialist Progress Note  [facility] Kathy Watkins RD, [facility]  Date: 5/9/2025    Intake    Program Intake  Reason for Diabetes Program Visit:: Initial Diabetes Assessment  Current diabetes risk level:: low (HgbA1c 6.9%)  In the last month, have you used the ER or been admitted to the hospital: No  Permission to speak with others about care:: no    Current Diabetes Treatment: Insulin, DM Injectables, Oral Medications  Oral Medication Type/Dose: metformin 1000mg twice a day and jardiance 25mg once a day  DM Injectables Type/Dose: mounjaro 15mg a week  Method of insulin delivery?: Insulin Pump  Type of Pump: omnipod 5  Does patient have back-up plan?: Yes  Any problems obtaining supplies?: Yes    Continuous Glucose Monitoring  Patient has CGM: Yes  Personal CGM type:: Dexcom G7  GMI Date: 05/09/25  GMI Value: 8 %    Lab Results   Component Value Date    HGBA1C 6.9 (H) 10/24/2024       Weight: 102 kg (224 lb 13.9 oz)   Height: 5' 11" (180.3 cm)   Body mass index is 31.36 kg/m².    Lifestyle Coping Support & Clinical    Lifestyle/Coping/Support  Compared to other people your age, how would you rate your health?: Good  Does anyone in your family have diabetes or does anyone in your family support you in your diabetes care?: FH: Father, uncles Support: spouse and children  List anything about Diabetes that causes you stress?: not having what he needs  How do you deal with stress/distress?: call to get what ever it is  Learning Barriers:: None  Culture or Caodaism beliefs that may impact ability to access healthcare: No  Psychosocial/Coping Skills Assessment Completed: : Yes  Assessment indicates:: Adequate understanding  Area of need?: No    Problem Review  Active Comorbidities: Neuropathy, Hypertension, Hyperlipidemia/Dyslipidemia    Diabetes Self-Management Skills Assessment    Medication Skills Assessment  Patient is able to identify current diabetes medications, dosages, and appropriate timing of " medications.: yes  Patient reports problems or concerns with current medication regimen.: yes  Medication regimen problems/concerns:: other (see comments) (patient needs a new )  Patient is  aware that some diabetes medications can cause low blood sugar?: Yes  Medication Skills Assessment Completed:: Yes  Assessment indicates:: Adequate understanding, Instruction Needed  Area of need?: Yes    Diabetes Disease Process/Treatment Options  Diabetes Type?: Type II  When were you diagnosed?: age 37  If previous diabetes education, when/where:: multiple times since diagnosis  What are your goals for this education session?: restart the omnipod  Is patient aware of what causes diabetes?: Yes  Does patient understand the pathophysiology of diabetes?: Yes  Diabetes Disease Process/Treatment Options: Skills Assessment Completed: Yes  Assessment indicates:: Adequate understanding  Area of need?: No    Nutrition/Healthy Eating  Nutrition/Healthy Eating Skills Assessment Completed:: No  Deffered due to:: Time  Area of need?: No    Physical Activity/Exercise  Physical Activity/Exercise Skills Assessment Completed: : No  Deffered due to:: Time  Area of need?: No    Home Blood Glucose Monitoring  Patient states that blood sugar is checked at home daily.: yes  Monitoring Method:: personal continuous glucose monitor  Personal CGM type:: Dexcom G7   What is your current Time in Range?: 37%  What is your A1c Target?: 7.0 without hypoglycemia  Home Blood Glucose Monitoring Skills Assessment Completed: : Yes  Assessment indicates:: Adequate understanding  Area of need?: No    Acute Complications  Have you ever had hypoglycemia (low BG 70 or less)?: yes  How often and what are your symptoms?: not often, weak, shaky  How do you treat hypoglycemia?: juice, soda, or candy  Have you ever had hyperglycemia (high  or more)?: no   Do you know the symptoms of high blood sugar and how to treat: yes  Have you ever had DKA?: no  Do you  ever test for ketones?: no  Do you have a sick day plan?: no  Acute Complications Skills Assessment Completed: : Yes  Assessment indicates:: Adequate understanding  Area of need?: No    Chronic Complications  Reviewed health maintenance: yes  Have you completed your annual diabetes maintenance labwork? : yes  Do you examine your feet daily?: no  Has your doctor examined your feet?: yes  Do you see a Dentist?: no  Do you see an eye doctor?: no  Chronic Complications Skills Assessment Completed: : Yes  Assessment indicates:: Adequate understanding, Other (comment)  Area of need?: Yes      Assessment Summary and Plan    Based on today's diabetes care assessment, the following areas of need were identified:      Identified Areas of Need      Medication/Current Diabetes Treatment: Yes, setup omnipod in new    Lifestyle Coping/Support: No   Diabetes Disease Process/Treatment Options: No   Nutrition/Healthy Eating: No    Physical Activity/Exercise: No    Home Blood Glucose Monitoring: No    Acute Complications: No    Chronic Complications: Yes, Discussed importance of A1c less than 7 to reduce risk of micro and macro complications, including nephropathy, neuropathy, retinopathy, heart attack and stroke. Reviewed the importance of controlled Blood Pressure and Cholesterol Lab Values in preventing disease.   Health maintenance reviewed  Diabetes Care Schedule   A1c every 3 to 6 months  Lipid Panel every year  Microalbumin (urine test) once per year  Comprehensive Foot Exam once per year  Dilated Eye Exam at least once per year  Dental exam every 6 months  Flu Vaccination yearly   Shingles and Pneumonia Vaccination as recommended by physician      Reviewed diabetes care schedule, foot care guidelines, diabetes and retinopathy screening, s/s hypo and hyperglycemia, long/short term complication of uncontrolled DM, importance of compliance with treatment plan    Reviewed risk of heart disease  High blood pressure  High  cholesterol  Diet  Limited activity  Medication non-adherence  Having diabetes    Reviewed that heart disease is the leading cause of death in people with uncontrolled diabetes  Reviewed ways to prevent complications:  Avoid smoking and other types of tobacco  Checking feet daily and having routine comprehensive foot exams  Controlling blood sugar  Controlling cholesterol and triglycerides  Having regular diabetic eye exams  Healthy eating and regular activity  Maintaining optimal blood glucose control       Today's interventions were provided through individual discussion, instruction, and written materials were provided.      Patient verbalized understanding of instruction and written materials.  Pt was able to return back demonstration of instructions today. Patient understood key points, needs reinforcement and further instruction.     Diabetes Self-Management Care Plan:    Today's Diabetes Self-Management Care Plan was developed with Cedrick's input. Cedrick has agreed to work toward the following goal(s) to improve his/her overall diabetes control.      Care Plan: Diabetes Management   Updates made since 5/9/2024 12:00 AM        Problem: Healthy Eating         Goal: Eat 3 meals daily with 45-60g/3-4 servings of Carbohydrate per meal. Completed 5/5/2025   Start Date: 6/21/2022   Expected End Date: 12/31/2023   Recent Progress: On track   Priority: High   Barriers: Lack of Motivation to Change   Note:    Reviewed carb counting, portion control, importance of spacing meals throughout the day to prevent post prandial elevations.  Recommended low saturated fat, low sodium diet to aid in control of hypertension and cholesterol. Reviewed plate method and portion control, dining out tips, meal planning, reading a food label, healthy snack options, benefits of physical activity           Follow Up Plan     Follow up in about 6 months (around 11/5/2025) for Insulin Pump Upload, General Follow-up, Personal CGM  Upload.    Today's care plan and follow up schedule was discussed with patient.  Cedrick verbalized understanding of the care plan, goals, and agrees to follow up plan.        The patient was encouraged to communicate with his/her health care provider/physician and care team regarding his/her condition(s) and treatment.  I provided the patient with my contact information today and encouraged to contact me via phone or [facility] as needed.     Length of Visit   Total Time: 40 Minutes

## 2025-06-16 RX ORDER — LISINOPRIL AND HYDROCHLOROTHIAZIDE 12.5; 2 MG/1; MG/1
1 TABLET ORAL DAILY
Qty: 90 TABLET | Refills: 0 | Status: SHIPPED | OUTPATIENT
Start: 2025-06-16

## 2025-06-16 NOTE — TELEPHONE ENCOUNTER
Care Due:                  Date            Visit Type   Department     Provider  --------------------------------------------------------------------------------                                MYCHART                              ANNUAL                              CHECKUP/PHY  [facility]  Last Visit: 05-      S            PRIMARY CARE   Alex Mckoy  Next Visit: None Scheduled  None         None Found                                                            Last  Test          Frequency    Reason                     Performed    Due Date  --------------------------------------------------------------------------------    Office Visit  15 months..  EScitalopram,              05- 08-                             lisinopriL-hydrochlorothi                             azide....................    [employer] Embedded Care Due Messages. Reference number: [id number].   6/16/2025 6:54:28 AM CDT

## 2025-06-16 NOTE — TELEPHONE ENCOUNTER
Provider Staff:  Action required for this patient    Requires appointment      Please see care gap opportunities below in Care Due Message.    Thanks!  [facility]     Appointments      Date Provider   Last Visit   5/29/2024 Alex Mckoy MD   Next Visit   Visit date not found Alex Mckoy MD     Refill Decision Note   Cedrick Saunders  is requesting a refill authorization.  Brief Assessment and Rationale for Refill:  Approve     Medication Therapy Plan:         Comments:     Note composed:12:17 PM 06/16/2025

## 2025-07-02 ENCOUNTER — OFFICE VISIT (OUTPATIENT)
Dept: DIABETES | Facility: CLINIC | Age: 60
End: 2025-07-02
Payer: COMMERCIAL

## 2025-07-02 VITALS
OXYGEN SATURATION: 98 % | HEART RATE: 68 BPM | WEIGHT: 219 LBS | HEIGHT: 71 IN | SYSTOLIC BLOOD PRESSURE: 118 MMHG | DIASTOLIC BLOOD PRESSURE: 70 MMHG | BODY MASS INDEX: 30.66 KG/M2

## 2025-07-02 DIAGNOSIS — Z96.41 INSULIN PUMP IN PLACE: ICD-10-CM

## 2025-07-02 DIAGNOSIS — E11.69 DM TYPE 2 WITH DIABETIC MIXED HYPERLIPIDEMIA: ICD-10-CM

## 2025-07-02 DIAGNOSIS — E78.2 MIXED HYPERLIPIDEMIA: ICD-10-CM

## 2025-07-02 DIAGNOSIS — Z46.81 INSULIN PUMP FITTING OR ADJUSTMENT: ICD-10-CM

## 2025-07-02 DIAGNOSIS — Z71.9 HEALTH EDUCATION/COUNSELING: ICD-10-CM

## 2025-07-02 DIAGNOSIS — Z79.4 TYPE 2 DIABETES MELLITUS WITH HYPERGLYCEMIA, WITH LONG-TERM CURRENT USE OF INSULIN: Primary | Chronic | ICD-10-CM

## 2025-07-02 DIAGNOSIS — E66.811 CLASS 1 OBESITY DUE TO EXCESS CALORIES WITH SERIOUS COMORBIDITY AND BODY MASS INDEX (BMI) OF 30.0 TO 30.9 IN ADULT: ICD-10-CM

## 2025-07-02 DIAGNOSIS — E78.2 DM TYPE 2 WITH DIABETIC MIXED HYPERLIPIDEMIA: ICD-10-CM

## 2025-07-02 DIAGNOSIS — I10 ESSENTIAL HYPERTENSION: ICD-10-CM

## 2025-07-02 DIAGNOSIS — Z12.5 PROSTATE CANCER SCREENING: ICD-10-CM

## 2025-07-02 DIAGNOSIS — E11.65 TYPE 2 DIABETES MELLITUS WITH HYPERGLYCEMIA, WITH LONG-TERM CURRENT USE OF INSULIN: Primary | Chronic | ICD-10-CM

## 2025-07-02 DIAGNOSIS — E66.09 CLASS 1 OBESITY DUE TO EXCESS CALORIES WITH SERIOUS COMORBIDITY AND BODY MASS INDEX (BMI) OF 30.0 TO 30.9 IN ADULT: ICD-10-CM

## 2025-07-02 PROCEDURE — 99999 PR PBB SHADOW E&M-EST. PATIENT-LVL IV: CPT | Mod: PBBFAC,,, | Performed by: NURSE PRACTITIONER

## 2025-07-02 RX ORDER — METFORMIN HYDROCHLORIDE 1000 MG/1
1000 TABLET ORAL 2 TIMES DAILY WITH MEALS
Qty: 180 TABLET | Refills: 2 | Status: SHIPPED | OUTPATIENT
Start: 2025-07-02

## 2025-07-02 RX ORDER — INSULIN PMP CART,AUT,G6/7,CNTR
1 EACH SUBCUTANEOUS
Qty: 3 EACH | Refills: 11 | Status: SHIPPED | OUTPATIENT
Start: 2025-07-02

## 2025-07-02 RX ORDER — BLOOD-GLUCOSE SENSOR
1 EACH MISCELLANEOUS
Qty: 3 EACH | Refills: 11 | Status: SHIPPED | OUTPATIENT
Start: 2025-07-02 | End: 2026-07-02

## 2025-07-02 RX ORDER — INSULIN LISPRO 100 [IU]/ML
INJECTION, SOLUTION INTRAVENOUS; SUBCUTANEOUS
Qty: 30 ML | Refills: 11 | Status: SHIPPED | OUTPATIENT
Start: 2025-07-02

## 2025-07-02 RX ORDER — ROSUVASTATIN CALCIUM 5 MG/1
5 TABLET, COATED ORAL NIGHTLY
Qty: 90 TABLET | Refills: 3 | Status: SHIPPED | OUTPATIENT
Start: 2025-07-02

## 2025-07-02 NOTE — PROGRESS NOTES
CC:   Chief Complaint   Patient presents with    Diabetes Mellitus       HPI: Cedrick Saunders is a 59 y.o. male presents for a follow up visit today for the management of T2DM.     He was diagnosed with Type 2 diabetes at age 37 with s/s of polyuria, nocturia, and polydipsia and BG was 600. A1c at diagnosis was > 12%. He was initially on oral medications Actos and Metformin. Insulin therapy was started in 2016.     Family hx of diabetes: father, paternal grandmother   Hospitalized for diabetes: denies     No personal or FH of thyroid cancer or personal of pancreatic cancer or pancreatitis.         Our last visit was in October 2024   Missed his follow-up visit  At our last visit we continue metformin 1000 twice a day, continue Mounjaro 50 mg weekly, continue Jardiance 25 mg daily---he prefers Farxiga but his insurance will only cover Jardiance  He is doing okay on the Jardiance now  Tolerating the Mounjaro without any side effects  Since our last visit he was able to get the Omnipod 5 lena on his phone so he transitioned it over to the iPhone---however he did not put the settings incorrectly  Settings that are in the pump are not the settings that were in the   Based on his download he is very basal insulin heavy  He has been in manual mode mostly  He does attest to hypoglycemia particularly overnight----sometimes dropping into the 30s                                DIABETES COMPLICATIONS: none      Diabetes Management Status    ASA: not taking     Statin: On Crestor 5 mg nightly-- stopped Lipitor due to arthralgia -- and joint pain resolved with stopping the Lipitor.   ACE/ARB: Taking- Lisinopril 20 mg     The ASCVD Risk score (Leonie ZIEGLER, et al., 2019) failed to calculate for the following reasons:    The valid total cholesterol range is 130 to 320 mg/dL      Screening or Prevention Patient's value Goal Complete/Controlled?   HgA1C Testing and Control   Lab Results   Component Value Date    HGBA1C  6.9 (H) 10/24/2024      Annually/Less than 8% No   Lipid profile : 10/24/2024 Annually Yes   LDL control Lab Results   Component Value Date    LDLCALC 64.2 10/24/2024    Annually/Less than 100 mg/dl  Yes   Nephropathy screening Lab Results   Component Value Date    LABMICR 10.0 03/04/2024     Lab Results   Component Value Date    PROTEINUA Trace (A) 05/19/2006    Annually Yes   Blood pressure BP Readings from Last 1 Encounters:   07/02/25 118/70    Less than 140/90 Yes   Dilated retinal exam : 08/17/2023- Annually No   Foot exam   : 07/02/2025 Annually Yes       CURRENT A1C:    Hemoglobin A1C   Date Value Ref Range Status   10/24/2024 6.9 (H) 4.0 - 5.6 % Final     Comment:     ADA Screening Guidelines:  5.7-6.4%  Consistent with prediabetes  >or=6.5%  Consistent with diabetes    High levels of fetal hemoglobin interfere with the HbA1C  assay. Heterozygous hemoglobin variants (HbS, HgC, etc)do  not significantly interfere with this assay.   However, presence of multiple variants may affect accuracy.     06/21/2024 7.0 (H) 4.0 - 5.6 % Final     Comment:     ADA Screening Guidelines:  5.7-6.4%  Consistent with prediabetes  >or=6.5%  Consistent with diabetes    High levels of fetal hemoglobin interfere with the HbA1C  assay. Heterozygous hemoglobin variants (HbS, HgC, etc)do  not significantly interfere with this assay.   However, presence of multiple variants may affect accuracy.     03/01/2024 7.4 (H) 4.0 - 5.6 % Final     Comment:     ADA Screening Guidelines:  5.7-6.4%  Consistent with prediabetes  >or=6.5%  Consistent with diabetes    High levels of fetal hemoglobin interfere with the HbA1C  assay. Heterozygous hemoglobin variants (HbS, HgC, etc)do  not significantly interfere with this assay.   However, presence of multiple variants may affect accuracy.         GOAL A1C: 7% or less without hypoglycemia.       DM MEDICATIONS USED IN THE PAST: Metformin, Januvia, Basaglar, Farxiga, Glyburide, Actos   Invokana- yeast  infections, Tresiba, Ozempic   VGo  Novolog   Dexcom --  Fiasp --  Jardiance - yeast infections   Humalog   Omnipod 5   Dexcom G7       CURRENT DIABETES MEDICATIONS: Metformin 1000 mg BID,   Jardiance 25 mg -- off the Farxiga -- taking the jardiance right now   Mounjaro 15 mg weekly   Omnipod 5    Pump settings:  Basal:   MN- 8AM: 1.65 units/hr --  8AM- 12AM ([place])- 1.95 units /hr -  ICR:1:15-- set doses with meals -- 10-12 units --100 gram or 120 grams -- increase prandial insulin administration   ISF: 1:50-  Target: 110-180  IOB: 4--    He put the setting in his phone and transferred them incorrectly     Pump site change: q 2-3 days   Cartridge change: q 2-3 days  Insulin TDD:  35.9 units    Basal 84%   Bolus  16%        Back up Lantus/Levemir: none-- will put back up tresiba at pharmacy     Patient's pump was downloaded in clinic today and reviewed with patient.   Please see attached documents for pump download.           BLOOD GLUCOSE MONITORING:    Sensor type: Dexcom G7   Average BG readin  Time in range:58%   31% high   9% very high   2% low   <1% very low   Site change:  q10 days  Estimated A1c-- 7.4%    2 weeks prior -- BG was 173  on average --63% in range   GMI-7.4%    Supplies with pharmacy     Sensor was downloaded in clinic today and reviewed with patient.   Please see attached document for download.         HYPOGLYCEMIA:  Reports that he has dropped into the 30s when he is in manual mode    Carries crackers / juice       MEALS: eating 3 meals per day   + dietary indiscretions-- nadia in the evening   Eating less it during the day now that he is working out in the heat---now working in maintenance  Diet green tea or water.   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   HENT:  Negative for trouble swallowing.    Eyes:  Negative for  visual disturbance.   Respiratory:  Negative for shortness of breath.    Cardiovascular:  Negative for chest pain.   Gastrointestinal:  Negative for nausea.   Endocrine: Negative for polydipsia, polyphagia and polyuria.   Genitourinary:         No Nocturia    Musculoskeletal:  Positive for arthralgias, back pain and neck pain.   Skin:  Negative for rash and wound.   Neurological:  Negative for numbness.   Psychiatric/Behavioral:  Negative for dysphoric mood.        Physical Exam   Physical Exam  Vitals and nursing note reviewed.   Constitutional:       Appearance: He is well-developed. He is obese.   HENT:      Head: Normocephalic and atraumatic.      Right Ear: External ear normal.      Left Ear: External ear normal.      Nose: Nose normal.   Neck:      Thyroid: No thyromegaly.      Trachea: No tracheal deviation.   Cardiovascular:      Rate and Rhythm: Normal rate and regular rhythm.      Heart sounds: No murmur heard.  Pulmonary:      Effort: Pulmonary effort is normal. No respiratory distress.      Breath sounds: Normal breath sounds.   Abdominal:      Palpations: Abdomen is soft.      Tenderness: There is no abdominal tenderness.      Hernia: No hernia is present.   Musculoskeletal:      Cervical back: Normal range of motion and neck supple.   Skin:     General: Skin is warm and dry.      Capillary Refill: Capillary refill takes less than 2 seconds.      Findings: No rash.      Comments: Omnipod sites and dexcom sites with no complications.  No lipo hypertropthy or atrophy   Neurological:      Mental Status: He is alert and oriented to person, place, and time.      Cranial Nerves: No cranial nerve deficit.   Psychiatric:         Mood and Affect: Mood is not depressed. Affect is not tearful.         Behavior: Behavior normal.         Judgment: Judgment normal.           FOOT EXAMINATION:  Appropriate footwear     Protective Sensation (w/ 10 gram monofilament):  Right: Decreased  Left: Intact    Visual  Inspection:  Normal -  Bilateral, Nails Intact - without Evidence of Foot Deformity- Bilateral, and Callus -  Bilateral    Pedal Pulses:   Right: Present  Left: Present    Posterior Tibialis Pulses:   Right:Present  Left: Present          Lab Results   Component Value Date    TSH 2.458 03/01/2024             Type 2 diabetes mellitus with hyperglycemia, with long-term current use of insulin  Check A1c today with labs  Dexcom readings above goal --running higher -- see attached download   + prandial excursions r/t dietary indiscretions and/ or missed doses/ bolus timing off of bolus doses ---discussed pump optimization at length with patient today  He needs to work on staying in automated mode  We discussed that the sensor and the pump pod must be on the same side of the body for connection purposes  We also discussed that he needs to be bolusing before meals-he is currently basal heavy    Cost is an issue with diabetes management.   We had a lot of insurance issues at the beginning of this year- causing some noncompliance with medications   No longer covered for Fiasp or Humalog   We had to appeal for the Mounjaro -  He needs to reach out to [employer] to see if he can get the Farxiga for free   However he is tolerating the Jardiance without any side effects      Long discussion with patient about proper Omnipod 5 use and pump optimization        Medication changes:       Continue Metformin 1000 mg 2 times per day     Continue Mounjaro 15 mg weekly     Continue either Jardiance 25 mg daily -- he prefers the Farxiga. He will reach out to the company to see if he can get the Farxiga for free since his insurance denied our appeal  We discussed that if he stays on the Jardiance that he needs to make sure he is drinking lots of water and that he bring a change of clothes and change intermediate through the day to prevent a moist environment for yeast infections to occur      Insulin Pump:Omnipod 5 with Humalog ( on Humalog per  his insurance)   Pump settings:  Basal:   MN- 8AM: 1.4 units/hr --cut back to prevent hypoglycemia when in manual mode  8AM- 12AM ([place])- 1.55units /hr -cut back to prevent hypoglycemia when in manual mode    ICR:1:10-- set doses with meals -- 6-8 units 60-80 grams -- work on prandial insulin administration  ISF: 1:40-  Target: 110-120  IOB: 3-hours    Adjusted his settings appropriately since he had change the settings in his pump himself  Stressed staying in auto mode      -- Reviewed goals of therapy are to get the best control we can without hypoglycemia  -- Reviewed patient's current insulin regimen. Clarified proper insulin dose and timing in relation to meals, etc. Insulin injection sites and proper rotation instructed.    -- Advised frequent self blood glucose monitoring.  Patient encouraged to document glucose results and bring them to every clinic visit - continue Dexcom G7 Rx sent to pharmacy  -- Hypoglycemia precautions discussed. Instructed on precautions before driving.    -- Call for Bg repeatedly < 70 or > 180.   -- Close adherence to lifestyle changes recommended.   -- Periodic follow ups for eye evaluations, foot care and dental care suggested.          Patient has diabetes mellitus and manages diabetes with intensive insulin regimen and uses prandial and basal insulin daily-- via omnipod 5   Patient requires a therapeutic CGM and is willing to use therapeutic CGM for the necessary frequent adjustments of insulin therapy.  I have completed an in-person visit during the previous 6 months and will continue to have in-person visits every 6 months to assess adherence to their CGM regimen and diabetes treatment plan.  Due to COVID pandemic and need for remote monitoring this tool is medically necessary         Essential hypertension  BP goal is < 140/90.   Tolerating ACEi  Controlled   Blood pressure goals discussed with patient      Mixed hyperlipidemia  On statin per ADA recommendations  LDL goal < 100.   LDL at goal.  LFTs within normal limits Continue statin.    DM type 2 with diabetic mixed hyperlipidemia  See above     Class 1 obesity due to excess calories with serious comorbidity and body mass index (BMI) of 30.0 to 30.9 in adult  Body mass index is 30.54 kg/m².  Increases insulin resistance.   Discussed DM diet and exercise.       Insulin pump in place  See above     Insulin pump fitting or adjustment  See above              I spent a total of 30 minutes on the day of the visit.This includes face to face time and non-face to face time preparing to see the patient (eg, review of tests), obtaining and/or reviewing separately obtained history, documenting clinical information in the electronic or other health record, independently interpreting results and communicating results to the patient/family/caregiver, or care coordinator.          Follow up in about 4 months (around 11/2/2025).  Labs today   Follow up with me in 4 months with labs prior         Orders Placed This Encounter   Procedures    PSA, Screening     Standing Status:   Future     Number of Occurrences:   1     Expected Date:   7/2/2025     Expiration Date:   1/2/2027     Send normal result to authorizing provider's In Basket if patient is active on MyChart::   Yes         Recommendations were discussed with the patient in detail  The patient verbalized understanding and agrees with the plan outlined as above.

## 2025-07-02 NOTE — ASSESSMENT & PLAN NOTE
Check A1c today with labs  Dexcom readings above goal --running higher -- see attached download   + prandial excursions r/t dietary indiscretions and/ or missed doses/ bolus timing off of bolus doses ---discussed pump optimization at length with patient today  He needs to work on staying in automated mode  We discussed that the sensor and the pump pod must be on the same side of the body for connection purposes  We also discussed that he needs to be bolusing before meals-he is currently basal heavy    Cost is an issue with diabetes management.   We had a lot of insurance issues at the beginning of this year- causing some noncompliance with medications   No longer covered for Fiasp or Humalog   We had to appeal for the Mounjaro -  He needs to reach out to [employer] to see if he can get the Farxiga for free   However he is tolerating the Jardiance without any side effects      Long discussion with patient about proper Omnipod 5 use and pump optimization        Medication changes:       Continue Metformin 1000 mg 2 times per day     Continue Mounjaro 15 mg weekly     Continue either Jardiance 25 mg daily -- he prefers the Farxiga. He will reach out to the company to see if he can get the Farxiga for free since his insurance denied our appeal  We discussed that if he stays on the Jardiance that he needs to make sure he is drinking lots of water and that he bring a change of clothes and change snf through the day to prevent a moist environment for yeast infections to occur      Insulin Pump:Omnipod 5 with Humalog ( on Humalog per his insurance)   Pump settings:  Basal:   MN- 8AM: 1.4 units/hr --cut back to prevent hypoglycemia when in manual mode  8AM- 12AM ([place])- 1.55units /hr -cut back to prevent hypoglycemia when in manual mode    ICR:1:10-- set doses with meals -- 6-8 units 60-80 grams -- work on prandial insulin administration  ISF: 1:40-  Target: 110-120  IOB: 3-hours    Adjusted his settings appropriately since  he had change the settings in his pump himself  Stressed staying in auto mode      -- Reviewed goals of therapy are to get the best control we can without hypoglycemia  -- Reviewed patient's current insulin regimen. Clarified proper insulin dose and timing in relation to meals, etc. Insulin injection sites and proper rotation instructed.    -- Advised frequent self blood glucose monitoring.  Patient encouraged to document glucose results and bring them to every clinic visit - continue Dexcom G7 Rx sent to pharmacy  -- Hypoglycemia precautions discussed. Instructed on precautions before driving.    -- Call for Bg repeatedly < 70 or > 180.   -- Close adherence to lifestyle changes recommended.   -- Periodic follow ups for eye evaluations, foot care and dental care suggested.          Patient has diabetes mellitus and manages diabetes with intensive insulin regimen and uses prandial and basal insulin daily-- via omnipod 5   Patient requires a therapeutic CGM and is willing to use therapeutic CGM for the necessary frequent adjustments of insulin therapy.  I have completed an in-person visit during the previous 6 months and will continue to have in-person visits every 6 months to assess adherence to their CGM regimen and diabetes treatment plan.  Due to COVID pandemic and need for remote monitoring this tool is medically necessary

## 2025-07-07 ENCOUNTER — RESULTS FOLLOW-UP (OUTPATIENT)
Dept: DIABETES | Facility: CLINIC | Age: 60
End: 2025-07-07

## 2025-07-07 ENCOUNTER — PATIENT MESSAGE (OUTPATIENT)
Dept: DIABETES | Facility: CLINIC | Age: 60
End: 2025-07-07
Payer: COMMERCIAL

## 2025-07-07 DIAGNOSIS — E11.65 TYPE 2 DIABETES MELLITUS WITH HYPERGLYCEMIA, WITH LONG-TERM CURRENT USE OF INSULIN: Primary | Chronic | ICD-10-CM

## 2025-07-07 DIAGNOSIS — Z79.4 TYPE 2 DIABETES MELLITUS WITH HYPERGLYCEMIA, WITH LONG-TERM CURRENT USE OF INSULIN: Primary | Chronic | ICD-10-CM

## 2025-07-24 RX ORDER — ESCITALOPRAM OXALATE 20 MG/1
20 TABLET ORAL
Qty: 90 TABLET | Refills: 0 | OUTPATIENT
Start: 2025-07-24

## 2025-07-24 RX ORDER — ESCITALOPRAM OXALATE 20 MG/1
20 TABLET ORAL
Qty: 90 TABLET | Refills: 0 | Status: SHIPPED | OUTPATIENT
Start: 2025-07-24

## 2025-07-24 NOTE — TELEPHONE ENCOUNTER
No care due was identified.  [facility] Embedded Care Due Messages. Reference number: [id number].   7/24/2025 9:41:23 AM CDT

## 2025-07-24 NOTE — TELEPHONE ENCOUNTER
Refill Decision Note   Cedrick Saunders  is requesting a refill authorization.  Brief Assessment and Rationale for Refill:  Quick Discontinue     Medication Therapy Plan:         Comments:     Note composed:6:38 PM 07/24/2025

## 2025-07-24 NOTE — TELEPHONE ENCOUNTER
No care due was identified.  [facility] Embedded Care Due Messages. Reference number: [id number].   7/23/2025 9:31:27 PM CDT

## 2025-07-24 NOTE — TELEPHONE ENCOUNTER
Refill Decision Note   Cedrick Saunders  is requesting a refill authorization.  Brief Assessment and Rationale for Refill:  Approve     Medication Therapy Plan:         Comments:     Note composed:9:28 AM 07/24/2025

## 2025-08-01 ENCOUNTER — OFFICE VISIT (OUTPATIENT)
Dept: PRIMARY CARE CLINIC | Facility: CLINIC | Age: 60
End: 2025-08-01
Payer: COMMERCIAL

## 2025-08-01 ENCOUNTER — TELEPHONE (OUTPATIENT)
Dept: PRIMARY CARE CLINIC | Facility: CLINIC | Age: 60
End: 2025-08-01

## 2025-08-01 ENCOUNTER — PATIENT OUTREACH (OUTPATIENT)
Dept: ADMINISTRATIVE | Facility: HOSPITAL | Age: 60
End: 2025-08-01
Payer: COMMERCIAL

## 2025-08-01 VITALS
HEART RATE: 65 BPM | TEMPERATURE: 98 F | RESPIRATION RATE: 18 BRPM | DIASTOLIC BLOOD PRESSURE: 70 MMHG | SYSTOLIC BLOOD PRESSURE: 124 MMHG | OXYGEN SATURATION: 99 % | HEIGHT: 71 IN | WEIGHT: 220.56 LBS | BODY MASS INDEX: 30.88 KG/M2

## 2025-08-01 DIAGNOSIS — E78.2 DM TYPE 2 WITH DIABETIC MIXED HYPERLIPIDEMIA: ICD-10-CM

## 2025-08-01 DIAGNOSIS — R97.20 INCREASED PROSTATE SPECIFIC ANTIGEN (PSA) VELOCITY: ICD-10-CM

## 2025-08-01 DIAGNOSIS — F41.9 ANXIETY: ICD-10-CM

## 2025-08-01 DIAGNOSIS — Z12.11 ENCOUNTER FOR COLORECTAL CANCER SCREENING: ICD-10-CM

## 2025-08-01 DIAGNOSIS — E11.69 DM TYPE 2 WITH DIABETIC MIXED HYPERLIPIDEMIA: ICD-10-CM

## 2025-08-01 DIAGNOSIS — Z12.12 ENCOUNTER FOR COLORECTAL CANCER SCREENING: ICD-10-CM

## 2025-08-01 DIAGNOSIS — Z00.00 ANNUAL PHYSICAL EXAM: Primary | ICD-10-CM

## 2025-08-01 PROCEDURE — 99999 PR PBB SHADOW E&M-EST. PATIENT-LVL V: CPT | Mod: PBBFAC,,, | Performed by: FAMILY MEDICINE

## 2025-08-01 RX ORDER — ESCITALOPRAM OXALATE 20 MG/1
20 TABLET ORAL DAILY
Qty: 90 TABLET | Refills: 4 | Status: SHIPPED | OUTPATIENT
Start: 2025-08-01

## 2025-08-01 NOTE — LETTER
AUTHORIZATION FOR RELEASE OF   CONFIDENTIAL INFORMATION    Dear [facility],    We are seeing Cedrick Saunders, date of birth 1965, in the clinic at [facility]. Alex Mckoy MD is the patient's PCP. Cedrick Saunders has an outstanding lab/procedure at the time we reviewed his chart. In order to help keep his health information updated, he has authorized us to request the following medical record(s):        (  )  MAMMOGRAM                                      (  )  COLONOSCOPY      (  )  PAP SMEAR                                          (  )  OUTSIDE LAB RESULTS     (  )  DEXA SCAN                                          (X)  EYE EXAM            (  )  FOOT EXAM                                          (  )  ENTIRE RECORD     (  )  OUTSIDE IMMUNIZATIONS                 (  )  _______________       ATTN: STEVE      Please fax records to Alex Mckoy MD, [phone]     If you have any questions, please contact Steve at [phone]          Patient Name: Cedrick Saunders  : 1965  Patient Phone #: [phone]

## 2025-08-01 NOTE — PROGRESS NOTES
Health Maintenance Due   Topic Date Due    Shingles Vaccine (1 of 2) Never done    Diabetic Eye Exam  08/17/2024    Colorectal Cancer Screening  05/17/2025     Immunizations - reviewed and updated   Care Everywhere - triggered   Care Teams - updated   Outreach - Chart review done. Patient seen by PCP today, 8/1/2025  TIFFANIE sent to [facility] for most recent diabetic eye records  Colonoscopy screening scheduled for 8/15/2025

## 2025-08-01 NOTE — PROGRESS NOTES
Assessment:       1. Annual physical exam    2. Encounter for colorectal cancer screening    3. Increased prostate specific antigen (PSA) velocity    4. DM type 2 with diabetic mixed hyperlipidemia    5. Anxiety         Plan:       Annual physical exam    Encounter for colorectal cancer screening  -     Case Request Endoscopy: COLONOSCOPY, SCREENING, LOW RISK PATIENT    Increased prostate specific antigen (PSA) velocity  -     Ambulatory referral/consult to Urology; Future; Expected date: 08/08/2025    DM type 2 with diabetic mixed hyperlipidemia    Anxiety  -     EScitalopram oxalate (LEXAPRO) 20 MG tablet; Take 1 tablet (20 mg total) by mouth once daily.  Dispense: 90 tablet; Refill: 4      Assessment & Plan    Z00.00 Annual physical exam  Z12.11, Z12.12 Encounter for colorectal cancer screening  R97.20 Increased prostate specific antigen (PSA) velocity  E11.69, E78.2 DM type 2 with diabetic mixed hyperlipidemia  F41.9 Anxiety    - A1C results: 6.9 in October, 7.1 in July.  - PSA: 3.9, higher than previous years but still WNL.  - Family history of prostate cancer in father and uncle.  - Erectile dysfunction symptoms, likely related to age and long-term effects of diabetes.  - Diabetic neuropathy symptoms in feet, particularly in right foot.  - Current diabetes management includes Omnipod insulin pump, Jardiance, Metformin, and Mounjaro.  - Potential for improved glycemic control through significant carbohydrate reduction in diet.  - Potential for discontinuing insulin therapy if better glucose control achieved through diet and current oral medications.    ANNUAL PHYSICAL EXAM:   Consider getting flu vaccine when available, typically in about a month.    ENCOUNTER FOR COLORECTAL CANCER SCREENING:   Referred for colonoscopy.   Contact office to schedule colonoscopy appointment.    INCREASED PROSTATE SPECIFIC ANTIGEN (PSA) VELOCITY:   Explained PSA test results and their significance in prostate cancer screening.    Ordered PSA test (completed during visit).   Referred to Urology for prostate evaluation.   Follow up when Urology clinic contacts patient to schedule appointment.    DM TYPE 2 WITH DIABETIC MIXED HYPERLIPIDEMIA:   Discussed relationship between tight glycemic control and management of diabetic neuropathy.   Educated on impact of carbohydrate intake on glucose levels and diabetes management.   Explained connection between diabetes and erectile dysfunction as a form of neuropathy.   Patient to significantly reduce carbohydrate intake to improve glucose control.   Recommend eating protein-rich foods like chicken, steak, salmon, and vegetables like broccoli.    ANXIETY:   Continued Lexapro.       Medication List with Changes/Refills   Current Medications    BLOOD SUGAR DIAGNOSTIC (ACCU-CHEK GUIDE TEST STRIPS) STRP    TO USE TO CHECK BLOOD SUGAR 4 TIMES DAILY. TO USE WITH ACCU CHECK GUIDE ME    BLOOD-GLUCOSE SENSOR (DEXCOM G7 SENSOR) HAROON    1 Device by Misc.(Non-Drug; Combo Route) route every 10 days.    DICLOFENAC (VOLTAREN) 75 MG EC TABLET    Take 1 tablet by mouth twice daily    EMPAGLIFLOZIN (JARDIANCE) 25 MG TABLET    Take 1 tablet (25 mg total) by mouth once daily.    INSULIN LISPRO (HUMALOG U-100 INSULIN) 100 UNIT/ML INJECTION    To use continuously with Omnipod 5 insulin pump. Max TDD of 100 units    INSULIN PUMP CART,AUTO,BT,G6/7 (OMNIPOD 5 G6-G7 PODS, GEN 5,) CRTG    Inject 1 Device into the skin every 48 hours.    LISINOPRIL-HYDROCHLOROTHIAZIDE (PRINZIDE,ZESTORETIC) 20-12.5 MG PER TABLET    Take 1 tablet by mouth once daily    METFORMIN (GLUCOPHAGE) 1000 MG TABLET    Take 1 tablet (1,000 mg total) by mouth 2 (two) times daily with meals.    ROSUVASTATIN (CRESTOR) 5 MG TABLET    Take 1 tablet (5 mg total) by mouth every evening.    TIRZEPATIDE (MOUNJARO) 15 MG/0.5 ML PNIJ    Inject 15 mg into the skin every 7 days.    TRUE METRIX AIR GLUCOSE METER MISC    use as directed   Changed and/or Refilled Medications     Modified Medication Previous Medication    ESCITALOPRAM OXALATE (LEXAPRO) 20 MG TABLET EScitalopram oxalate (LEXAPRO) 20 MG tablet       Take 1 tablet (20 mg total) by mouth once daily.    Take 1 tablet by mouth once daily         Subjective:    Patient ID: Cedrick Saunders is a 59 y.o. male.  Chief Complaint: Annual Exam    HPI  History of Present Illness    CHIEF COMPLAINT:  Patient presents today for annual checkup    DIABETES:  Recent A1C results fluctuated between 6.9 in October and 7.1 in July. He experiences constant bilateral foot tingling from diabetic neuropathy, more pronounced in the right foot. During a recent needle sensation test, he did not feel sensation in his right foot toes. The tingling is present approximately 24/7, with symptoms slightly improving after physical activity. He currently manages diabetes with Omnipod insulin delivery system, Jardiance, and Metformin. He acknowledges family history of diabetes, noting his father had diabetes which he believes was genetically transmitted to him.    HEAD INJURY:  He sustained a head injury two weeks ago when struck by a concrete drill at work. He was hit forcefully, nearly lost consciousness, and took significant time to recover. He experienced headaches for approximately one week following the injury, which he believes may indicate a potential concussion. He denies loss of consciousness but reports being significantly disoriented at the time of the incident. He has no current ongoing headache symptoms.    GENITOURINARY:  He reports urinary symptoms characterized by initial low urinary pressure in the morning. Upon first awakening, urination occurs with minimal pressure, but after becoming active and mobile, urinary stream improves significantly. He denies dribbling or other significant urinary difficulties. He characterizes his bladder function as slow to start but normalizing with increased activity during the day.    SEXUAL HEALTH:  He  "reports experiencing erectile dysfunction for approximately one year, describing current erectile function as reduced to about 50% of previous capacity. He has been taking Tadalafil with limited effectiveness, noting the medication does not significantly improve erectile rigidity.    GI CONCERNS:  He reports a history of minor hemorrhoid issues. Two months ago, he experienced infection-like symptoms characterized by mucus discharge with an infectious odor. He self-treated with Preparation H, which successfully resolved the symptoms. He denies active bleeding associated with the hemorrhoid issue.    FAMILY HISTORY:  He reports significant family history of prostate cancer. His father and paternal uncle were both diagnosed with prostate cancer in their 60s. His father passed away in 2020.      ROS:  Constitutional: -fevers, -chills, +weight loss  Head: +recent head injury, +headaches  Cardiovascular: -chest pain  Gastrointestinal: +mucous in stool, +hemorrhoids  Genitourinary: +difficulty urinating  Male Genitourinary: +erectile dysfunction  Neurological: +tingling, +decreased sensation in extremities       Review of Systems    Objective:      Vitals:    08/01/25 0742   BP: 124/70   BP Location: Left arm   Patient Position: Sitting   Pulse: 65   Resp: 18   Temp: 97.5 °F (36.4 °C)   TempSrc: Oral   SpO2: 99%   Weight: 100 kg (220 lb 9.1 oz)   Height: 5' 11" (1.803 m)     BP Readings from Last 5 Encounters:   08/01/25 124/70   07/02/25 118/70   10/29/24 118/72   10/24/24 110/74   06/25/24 108/70     Wt Readings from Last 5 Encounters:   08/01/25 100 kg (220 lb 9.1 oz)   07/02/25 99.3 kg (219 lb)   05/09/25 102 kg (224 lb 13.9 oz)   10/30/24 104 kg (229 lb 4.5 oz)   10/29/24 103.9 kg (229 lb)     Physical Exam  Physical Exam    General: Well-developed. Well-nourished. No acute distress.  Eyes: EOMI. Sclerae anicteric.  HENT: Normocephalic. Atraumatic. Nares patent. Moist oral mucosa.  Cardiovascular: Regular rate. " Regular rhythm. No murmurs. No rubs. No gallops. Normal S1, S2.  Respiratory: Normal respiratory effort. Clear to auscultation bilaterally. No rales. No rhonchi. No wheezing.  Musculoskeletal: No  obvious deformity.  Extremities: No lower extremity edema.  Neurological: Alert & oriented x3. No slurred speech. Normal gait.  Psychiatric: Normal mood. Normal affect. Good insight. Good judgment.  Skin: Warm. Dry. No rash.         Lab Results   Component Value Date    WBC 4.57 07/02/2025    HGB 14.6 07/02/2025    HCT 43.9 07/02/2025     07/02/2025    CHOL 116 (L) 10/24/2024    TRIG 84 10/24/2024    HDL 35 (L) 10/24/2024    ALT 17 07/02/2025    AST 18 07/02/2025     07/02/2025    K 4.3 07/02/2025     07/02/2025    CREATININE 0.7 07/02/2025    BUN 21 07/02/2025    CO2 21 (L) 07/02/2025    TSH 2.087 07/02/2025    PSA 3.92 07/02/2025    HGBA1C 7.1 (H) 07/02/2025      This note was generated with the assistance of ambient listening technology. Verbal consent was obtained by the patient and accompanying visitor(s) for the recording of patient appointment to facilitate this note. I attest to having reviewed and edited the generated note for accuracy, though some syntax or spelling errors may persist. Please contact the author of this note for any clarification.

## 2025-08-13 ENCOUNTER — TELEPHONE (OUTPATIENT)
Dept: UROLOGY | Facility: CLINIC | Age: 60
End: 2025-08-13
Payer: COMMERCIAL

## 2025-08-14 ENCOUNTER — OFFICE VISIT (OUTPATIENT)
Dept: UROLOGY | Facility: CLINIC | Age: 60
End: 2025-08-14
Payer: COMMERCIAL

## 2025-08-14 DIAGNOSIS — R97.20 INCREASED PROSTATE SPECIFIC ANTIGEN (PSA) VELOCITY: ICD-10-CM

## 2025-08-27 DIAGNOSIS — E11.9 TYPE 2 DIABETES MELLITUS WITHOUT COMPLICATION, UNSPECIFIED WHETHER LONG TERM INSULIN USE: ICD-10-CM
